# Patient Record
Sex: FEMALE | Race: BLACK OR AFRICAN AMERICAN | Employment: OTHER | URBAN - METROPOLITAN AREA
[De-identification: names, ages, dates, MRNs, and addresses within clinical notes are randomized per-mention and may not be internally consistent; named-entity substitution may affect disease eponyms.]

---

## 2017-01-16 ENCOUNTER — TELEPHONE (OUTPATIENT)
Dept: NEUROLOGY | Age: 82
End: 2017-01-16

## 2017-01-16 NOTE — TELEPHONE ENCOUNTER
Pt daughter, has questions about Depakote, would like a returned phone call please. Wants to know how long she is supposed to be taking the medication and how it's affecting her.

## 2017-01-17 ENCOUNTER — TELEPHONE (OUTPATIENT)
Dept: NEUROLOGY | Age: 82
End: 2017-01-17

## 2017-01-24 NOTE — TELEPHONE ENCOUNTER
Pt's daughter calling about medication Depakote. She said she isn't seeing any progress she seems to think the pt is getting worse.  Please give her a call back

## 2017-02-10 RX ORDER — POTASSIUM CHLORIDE 750 MG/1
TABLET, EXTENDED RELEASE ORAL
Qty: 30 TAB | Refills: 2 | Status: SHIPPED | OUTPATIENT
Start: 2017-02-10 | End: 2017-05-12 | Stop reason: SDUPTHER

## 2017-02-28 ENCOUNTER — OFFICE VISIT (OUTPATIENT)
Dept: INTERNAL MEDICINE CLINIC | Age: 82
End: 2017-02-28

## 2017-02-28 VITALS
SYSTOLIC BLOOD PRESSURE: 118 MMHG | RESPIRATION RATE: 20 BRPM | HEART RATE: 71 BPM | BODY MASS INDEX: 23.79 KG/M2 | WEIGHT: 126 LBS | DIASTOLIC BLOOD PRESSURE: 68 MMHG | OXYGEN SATURATION: 97 % | TEMPERATURE: 97.9 F | HEIGHT: 61 IN

## 2017-02-28 DIAGNOSIS — M79.642 PAIN OF LEFT HAND: Primary | ICD-10-CM

## 2017-02-28 DIAGNOSIS — R63.4 WEIGHT LOSS, UNINTENTIONAL: ICD-10-CM

## 2017-02-28 DIAGNOSIS — C83.38 DIFFUSE LARGE B-CELL LYMPHOMA OF LYMPH NODES OF MULTIPLE REGIONS (HCC): ICD-10-CM

## 2017-02-28 NOTE — MR AVS SNAPSHOT
Visit Information Date & Time Provider Department Dept. Phone Encounter #  
 2/28/2017  2:30 PM Saritha Castano MD [facility] Internal Medicine [phone] [id number] Your Appointments 3/16/2017  3:00 PM  
[facility] [address] [facility]) Appt Note: 4 month follow up memory loss KRU 11/17 [address] [place] [address]  
[phone]  
  
   
 [address] [address] Upcoming Health Maintenance Date Due DTaP/Tdap/Td series (1 - Tdap) 5/8/1952 ZOSTER VACCINE AGE 60> 5/8/1991 GLAUCOMA SCREENING Q2Y 5/8/1996 MEDICARE YEARLY EXAM 5/8/1996 Pneumococcal 65+ High/Highest Risk (2 of 2 - PCV13) 5/1/2011 Allergies as of 2/28/2017  Review Complete On: 2/28/2017 By: Florinda Laura LPN Severity Noted Reaction Type Reactions Avapro [Irbesartan]  01/21/2011   Side Effect Other (comments) Hypotension and dizziness Crestor [Rosuvastatin]  01/21/2011   Side Effect Itching, Myalgia Pcn [Penicillins]  01/21/2011   Side Effect Rash Other reaction(s): hives Plaquenil [Hydroxychloroquine]  11/15/2016    Rash Quinine  01/21/2011   Side Effect Other (comments) Other reaction(s): other/intolerance \"ringing in my ears\" Ringing in ear Current Immunizations  Reviewed on 7/11/2016 Name Date Influenza High Dose Vaccine PF 11/1/2016 Influenza Vaccine 10/1/2010 Novel Influenza-H1N1-09, All Formulations 10/1/2010 Pneumococcal Polysaccharide (PPSV-23) 5/1/2010 Not reviewed this visit You Were Diagnosed With   
  
 Codes Comments Pain of left hand    -  Primary ICD-10-CM: M94.340 ICD-9-CM: 729.5 Weight loss, unintentional     ICD-10-CM: R63.4 ICD-9-CM: 783.21 Diffuse large B-cell lymphoma of lymph nodes of multiple regions [facility])     ICD-10-CM: C83.38 
ICD-9-CM: 202.88 Vitals  BP  
 118/68 BMI and BSA Data Body Mass Index Body Surface Area  
 23.81 kg/m 2 1.57 m 2 Preferred Pharmacy Pharmacy Name Phone [facility] [address], [address] [place] [address]. [phone] Your Updated Medication List  
  
   
This list is accurate as of: 2/28/17  2:49 PM.  Always use your most recent med list.  
  
  
  
  
 acetaminophen-codeine 300-30 mg per tablet Commonly known as:  TYLENOL-CODEINE #3 Take 1 Tab by mouth every four (4) hours as needed for Pain. Max Daily Amount: 6 Tabs. aspirin 325 mg tablet Commonly known as:  ASPIRIN Take 1 Tab by mouth daily. diclofenac 1 % Gel Commonly known as:  VOLTAREN Apply 2 g to affected area four (4) times daily. Apply on left knee 4 times a day. divalproex  mg tablet Commonly known as:  DEPAKOTE Take 1 Tab by mouth two (2) times a day. furosemide 20 mg tablet Commonly known as:  LASIX Take 1 Tab by mouth daily. KLOR-CON M10 10 mEq tablet Generic drug:  potassium chloride TAKE ONE TABLET BY MOUTH DAILY  
  
 metoprolol tartrate 50 mg tablet Commonly known as:  LOPRESSOR Take 1 Tab by mouth two (2) times a day. MIRALAX 17 gram packet Generic drug:  polyethylene glycol Take 17 g by mouth daily. multivitamin tablet Commonly known as:  ONE A DAY Take 1 Tab by mouth daily. PROAIR HFA 90 mcg/actuation inhaler Generic drug:  albuterol Take  by inhalation. raNITIdine 150 mg tablet Commonly known as:  ZANTAC Take 1 Tab by mouth daily. VITAMIN D3 1,000 unit Cap Generic drug:  cholecalciferol Take  by mouth daily. Introducing [facility]! Anali Pan introduces [employer] patient portal. Now you can access parts of your medical record, email your doctor's office, and request medication refills online. 1. In your internet browser, go to [email or web address] 2. Click on the First Time User? Click Here link in the Sign In box. You will see the New Member Sign Up page. 3. Enter your [employer] Access Code exactly as it appears below. You will not need to use this code after youve completed the sign-up process. If you do not sign up before the expiration date, you must request a new code. · [employer] Access Code: [email or web address] Expires: 5/29/2017  2:49 PM 
 
4. Enter the last four digits of your Social Security Number (xxxx) and Date of Birth (mm/dd/yyyy) as indicated and click Submit. You will be taken to the next sign-up page. 5. Create a [employer] ID. This will be your [employer] login ID and cannot be changed, so think of one that is secure and easy to remember. 6. Create a [employer] password. You can change your password at any time. 7. Enter your Password Reset Question and Answer. This can be used at a later time if you forget your password. 8. Enter your e-mail address. You will receive e-mail notification when new information is available in [address] 9. Click Sign Up. You can now view and download portions of your medical record. 10. Click the Download Summary menu link to download a portable copy of your medical information. If you have questions, please visit the Frequently Asked Questions section of the [employer] website. Remember, [employer] is NOT to be used for urgent needs. For medical emergencies, dial 911. Now available from your iPhone and Android! Please provide this summary of care documentation to your next provider. Your primary care clinician is listed as Rosy Yung. If you have any questions after today's visit, please call [phone].

## 2017-02-28 NOTE — PROGRESS NOTES
Chief Complaint   Patient presents with    Follow Up Chronic Condition     3 month follow up. Has lost some weight,  was 133lb now 126lbs, does not like ensure.  Hand Pain     left hand pain, had a fall from bed months ago and still having pain on top of hand and up the forearm, sometimes tingling sensation in figertips. 1. Have you been to the ER, urgent care clinic since your last visit? Hospitalized since your last visit? No    2. Have you seen or consulted any other health care providers outside of the [address] [place] [address] since your last visit? Include any pap smears or colon screening.  No

## 2017-02-28 NOTE — PROGRESS NOTES
Written by Tasia Brewer, as dictated by Dr. Vesta Kaplan MD.    Luly Bautista is a 80 y.o. female. HPI  The patient comes in today for a follow up. She has lost some weight. She has lost from 133 lbs to 126 lbs since 12/02/2016. She does not have any appetite. She has been having some night sweats. She does have a living will and advanced care plan. She does have a DNR. She is going to PT twice a week and she is ambulating with a walker. She had a fall from bed months ago in 08/2016 and still having pain on top of hand, and sometimes tingling sensation in figertips. She has not followed with a hematologist for the [address] She has been in remission and she has not seen anyone since. Patient Active Problem List   Diagnosis Code    Dyspnea on exertion R06.09    Lupus erythematosus L93.0    Sjogren's syndrome ([facility] M35.00    Chronic airway obstruction ([facility] J44.9    Hypertensive heart disease I11.9    Dyslipidemia E78.5    Coronary atherosclerosis of native coronary artery I25.10    Lymphoma, non-Hodgkin's (HCC) C85.90    Hypercholesterolemia E78.00    Essential hypertension, benign I10    Left displaced femoral neck fracture ([facility]) S72.002A    Diffuse large B-cell lymphoma of lymph nodes of multiple regions ([facility]) C83.38    Pulmonary fibrosis ([facility]) J84.10    Status post angioplasty with stent Z95.9    Essential hypertension with goal blood pressure less than 140/90 I10    Mixed hyperlipidemia E78.2    Lupus (systemic lupus erythematosus) (HCC) M32.9    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  60 Tab 0    cholecalciferol (VITAMIN D3) 1,000 unit cap Take  by mouth daily.  divalproex DR (DEPAKOTE) 250 mg tablet Take 1 Tab by mouth two (2) times a day. 60 Tab 3    multivitamin (ONE A DAY) tablet Take 1 Tab by mouth daily.  furosemide (LASIX) 20 mg tablet Take 1 Tab by mouth daily. 30 Tab 1    diclofenac (VOLTAREN) 1 % gel Apply 2 g to affected area four (4) times daily. Apply on left knee 4 times a day. 4 g 1    aspirin (ASPIRIN) 325 mg tablet Take 1 Tab by mouth daily. 30 Tab 1    polyethylene glycol (MIRALAX) 17 gram packet Take 17 g by mouth daily.  albuterol (PROAIR HFA) 90 mcg/actuation inhaler Take  by inhalation.  ranitidine (ZANTAC) 150 mg tablet Take 1 Tab by mouth daily. 30 Tab 0    acetaminophen-codeine (TYLENOL-CODEINE #3) 300-30 mg per tablet Take 1 Tab by mouth every four (4) hours as needed for Pain. Max Daily Amount: 6 Tabs. 30 Tab 0     No current facility-administered medications on file prior to visit. Allergies   Allergen Reactions    Avapro [Irbesartan] Other (comments)     Hypotension and dizziness    Crestor [Rosuvastatin] Itching and Myalgia    Pcn [Penicillins] Rash     Other reaction(s): hives    Plaquenil [Hydroxychloroquine] Rash    Quinine Other (comments)     Other reaction(s): other/intolerance  \"ringing in my ears\"  Ringing in ear       Past Medical History:   Diagnosis Date    CAD (coronary artery disease)     Chronic    Chronic airway obstruction, not elsewhere classified (HCC)     Frequent cough, wheezing secondary to lupus and COPD    Closed left hip fracture ([address]     Decubitus ulcer of sacral region, stage 3 ([address] 8/18/2016    Dyspnea on exertion     Echocardiogram 12/02/2010    EF 60-65%. Gr 1 DDfx. Mild LVH. Mild MR.    Essential hypertension, benign     GERD (gastroesophageal reflux disease)     Headache(784.0)     History of myocardial perfusion scan 09/14/2012    No evidence of ischemia or infarction. Very mild apical thinning. EF 77%.   No WMA.  TID 1.46, suggests 3-vessel CAD. Intermediate to high risk pharm stress test due to TID.  Hypercholesterolemia     Lower extremity venous duplex 05/08/2013    Left leg:  No venous thrombosis or venous insufficiency.  Lupus erythematosus 1992    Lymphoma, non-Hodgkin's ([facility] 5/2011    Low-grade being followed by Dr. Dedrick Cantu without therapy currently    Pneumothorax 1981    Blebs in right lung with recurrent Pneumothorax    S/P cardiac cath 09/24/2012    Hvy calcification of coronary arteries. LM minimal.  mLAD 35%. oD2 70% (unchanged). LCX mild. pRCA mild. Minimal ISR of prev stent. LVEDP 14-16. EF 65%. No WMA. Likely a falsely abnormal stress test.    Short-term memory loss     From fall in 2003    Sjogren's syndrome ([facility] 1992    report of ROGELIO, SSA, RF positive    Traumatic subdural hematoma ([facility] 2003    Head injury with subdural - s/p fall       Past Surgical History:   Procedure Laterality Date    CARDIAC SURG PROCEDURE UNLIST      cardiac stent    CHEST SURGERY PROCEDURE UNLISTED  1981    RUL removal    HX ADENOIDECTOMY  11/2013    eyelids lifted    HX CATARACT REMOVAL Bilateral     w/ lens implants    HX CORONARY STENT PLACEMENT  8/21/2007    HX HEART CATHETERIZATION  9/24/2012    HX HEART CATHETERIZATION  8/21/2007    [facility] Onel Edge [facility] - stent [facility]    HX HEENT  2003    subdural hematoma removed s/p fall    HX HYSTERECTOMY  1974    HX LOBECTOMY Right     upper portion    HX PNEUMONECTOMY      partial    NEUROLOGICAL PROCEDURE UNLISTED      subdural hematoma       Family History   Problem Relation Age of Onset    Cancer Father     Cancer Brother        Social History     Social History    Marital status:      Spouse name: N/A    Number of children: N/A    Years of education: N/A     Occupational History    Not on file.      Social History Main Topics    Smoking status: Former Smoker     Quit date: 1/1/1981    Smokeless tobacco: Never Used    Alcohol use Yes      Comment: once a month drinks wine    Drug use: No    Sexual activity: Not Currently     Other Topics Concern    Not on file     Social History Narrative    ** Merged History Encounter **            Office Visit on 12/02/2016   Component Date Value Ref Range Status    Glucose 12/02/2016 81  65 - 99 mg/dL Final    BUN 12/02/2016 10  8 - 27 mg/dL Final    Creatinine 12/02/2016 1.15* 0.57 - 1.00 mg/dL Final    GFR est non-AA 12/02/2016 43* >59 mL/min/1.73 Final    GFR est AA 12/02/2016 50* >59 mL/min/1.73 Final    BUN/Creatinine ratio 12/02/2016 9* 11 - 26 Final    Sodium 12/02/2016 143  136 - 144 mmol/L Final    Comment: **Effective December 12, 2016 the reference interval**    for Sodium, Serum will be changing to:                                              134 - 144      Potassium 12/02/2016 4.1  3.5 - 5.2 mmol/L Final    Chloride 12/02/2016 100  97 - 106 mmol/L Final    Comment: **Effective December 12, 2016 the reference interval**    for Chloride, Serum will be changing to:                                               96 - 106      CO2 12/02/2016 30* 18 - 29 mmol/L Final    Calcium 12/02/2016 10.6* 8.7 - 10.3 mg/dL Final    Protein, total 12/02/2016 6.9  6.0 - 8.5 g/dL Final    Albumin 12/02/2016 4.2  3.5 - 4.7 g/dL Final    GLOBULIN, TOTAL 12/02/2016 2.7  1.5 - 4.5 g/dL Final    A-G Ratio 12/02/2016 1.6  1.1 - 2.5 Final    Bilirubin, total 12/02/2016 0.3  0.0 - 1.2 mg/dL Final    Alk. phosphatase 12/02/2016 58  39 - 117 IU/L Final    AST (SGOT) 12/02/2016 20  0 - 40 IU/L Final    ALT (SGPT) 12/02/2016 8  0 - 32 IU/L Final    Interpretation 12/02/2016 Note   Final    Supplement report is available. Review of Systems   Constitutional: Positive for weight loss. Negative for malaise/fatigue. HENT: Negative for congestion. Respiratory: Negative for cough and wheezing. Cardiovascular: Negative for chest pain and palpitations.    Musculoskeletal: Negative for joint pain and myalgias. Neurological: Negative for weakness and headaches. Psychiatric/Behavioral: Negative for depression and suicidal ideas. The patient is not nervous/anxious. Visit Vitals    /68    Pulse 71    Temp 97.9 °F (36.6 °C) (Oral)    Resp 20    Ht 5' 1\" (1.549 m)    Wt 126 lb (57.2 kg)    SpO2 97%    BMI 23.81 kg/m2     Physical Exam   Constitutional: She is oriented to person, place, and time. She appears well-nourished. No distress. HENT:   Right Ear: External ear normal.   Left Ear: External ear normal.   Mouth/Throat: Oropharynx is clear and moist.   Eyes: Conjunctivae and EOM are normal. Right eye exhibits no discharge. Left eye exhibits no discharge. Neck: Normal range of motion. Neck supple. Cardiovascular: Normal rate and regular rhythm. Pulmonary/Chest: Effort normal and breath sounds normal. She has no wheezes. Abdominal: Soft. Bowel sounds are normal. She exhibits no distension. Musculoskeletal:   L hand tenderness on palpation, ROM normal, no swelling   Lymphadenopathy:     She has no cervical adenopathy. Neurological: She is alert and oriented to person, place, and time. Skin: Skin is intact. Psychiatric: She has a normal mood and affect. Nursing note and vitals reviewed. ASSESSMENT and PLAN    ICD-10-CM ICD-9-CM    1. Pain of left hand M79.642 729.5 I discussed that she can put asper cream on her hand as well. 2. Weight loss, unintentional R63.4 783.21 I discussed she needs to eat in order to help with wound healing. I suggested she should drink milkshakes with fruit in order to help keep her weight up. 3. Diffuse large B-cell lymphoma of lymph nodes of multiple regions [facility]) C83.38 202.88 I discussed that she should follow back up with a hematologist here Dr. Julia Cornejo or back with Dr. Jennifer Carlos in  [place] in order to have her checked out since she has lost weight. This plan was reviewed with the patient and patient agrees.  All questions were answered. This scribe documentation was reviewed by me and accurately reflects the examination and decisions made by me. This note will not be viewable in [address]

## 2017-03-16 ENCOUNTER — OFFICE VISIT (OUTPATIENT)
Dept: NEUROLOGY | Age: 82
End: 2017-03-16

## 2017-03-16 VITALS
OXYGEN SATURATION: 98 % | HEART RATE: 69 BPM | DIASTOLIC BLOOD PRESSURE: 76 MMHG | RESPIRATION RATE: 16 BRPM | SYSTOLIC BLOOD PRESSURE: 132 MMHG | BODY MASS INDEX: 23.98 KG/M2 | HEIGHT: 61 IN | WEIGHT: 127 LBS

## 2017-03-16 DIAGNOSIS — F01.50 DEMENTIA, VASCULAR, MIXED, WITHOUT BEHAVIORAL DISTURBANCE (HCC): Primary | ICD-10-CM

## 2017-03-16 RX ORDER — DONEPEZIL HYDROCHLORIDE 5 MG/1
5 TABLET, FILM COATED ORAL
Qty: 30 TAB | Refills: 0 | Status: SHIPPED | OUTPATIENT
Start: 2017-03-16 | End: 2017-04-27

## 2017-03-16 NOTE — PROGRESS NOTES
Patient is here for follow up  Short term memory is getting worse  Patient has good days and bad days

## 2017-03-16 NOTE — PATIENT INSTRUCTIONS

## 2017-03-16 NOTE — MR AVS SNAPSHOT
Visit Information Date & Time Provider Department Dept. Phone Encounter #  
 3/16/2017 11:15 AM Jazmyne Mccartney MD [facility] at [address] [phone] Follow-up Instructions Return in about 3 months (around 6/16/2017). Upcoming Health Maintenance Date Due DTaP/Tdap/Td series (1 - Tdap) 5/8/1952 ZOSTER VACCINE AGE 60> 5/8/1991 GLAUCOMA SCREENING Q2Y 5/8/1996 MEDICARE YEARLY EXAM 5/8/1996 Pneumococcal 65+ High/Highest Risk (2 of 2 - PCV13) 5/1/2011 Allergies as of 3/16/2017  Review Complete On: 3/16/2017 By: Jazmyne Mccartney MD  
  
 Severity Noted Reaction Type Reactions Avapro [Irbesartan]  01/21/2011   Side Effect Other (comments) Hypotension and dizziness Crestor [Rosuvastatin]  01/21/2011   Side Effect Itching, Myalgia Pcn [Penicillins]  01/21/2011   Side Effect Rash Other reaction(s): hives Plaquenil [Hydroxychloroquine]  11/15/2016    Rash Quinine  01/21/2011   Side Effect Other (comments) Other reaction(s): other/intolerance \"ringing in my ears\" Ringing in ear Current Immunizations  Reviewed on 7/11/2016 Name Date Influenza High Dose Vaccine PF 11/1/2016 Influenza Vaccine 10/1/2010 Novel Influenza-H1N1-09, All Formulations 10/1/2010 Pneumococcal Polysaccharide (PPSV-23) 5/1/2010 Not reviewed this visit You Were Diagnosed With   
  
 Codes Comments Dementia, vascular, mixed, without behavioral disturbance    -  Primary ICD-10-CM: F01.50 ICD-9-CM: 290.40 Vitals BP Pulse Resp Height(growth percentile) Weight(growth percentile) SpO2  
 132/76 69 16 5' 1\" (1.549 m) 127 lb (57.6 kg) 98% BMI OB Status Smoking Status 24 kg/m2 Hysterectomy Former Smoker Vitals History BMI and BSA Data Body Mass Index Body Surface Area  
 24 kg/m 2 1.57 m 2 Preferred Pharmacy Pharmacy Name Phone [address] [facility]entes, [address] [place] [address]. [phone] Your Updated Medication List  
  
   
This list is accurate as of: 3/16/17 11:55 AM.  Always use your most recent med list.  
  
  
  
  
 acetaminophen-codeine 300-30 mg per tablet Commonly known as:  TYLENOL-CODEINE #3 Take 1 Tab by mouth every four (4) hours as needed for Pain. Max Daily Amount: 6 Tabs. aspirin 325 mg tablet Commonly known as:  ASPIRIN Take 1 Tab by mouth daily. diclofenac 1 % Gel Commonly known as:  VOLTAREN Apply 2 g to affected area four (4) times daily. Apply on left knee 4 times a day. divalproex  mg tablet Commonly known as:  DEPAKOTE Take 1 Tab by mouth two (2) times a day. donepezil 5 mg tablet Commonly known as:  ARICEPT Take 1 Tab by mouth nightly. furosemide 20 mg tablet Commonly known as:  LASIX Take 1 Tab by mouth daily. KLOR-CON M10 10 mEq tablet Generic drug:  potassium chloride TAKE ONE TABLET BY MOUTH DAILY  
  
 metoprolol tartrate 50 mg tablet Commonly known as:  LOPRESSOR Take 1 Tab by mouth two (2) times a day. MIRALAX 17 gram packet Generic drug:  polyethylene glycol Take 17 g by mouth daily. multivitamin tablet Commonly known as:  ONE A DAY Take 1 Tab by mouth daily. PROAIR HFA 90 mcg/actuation inhaler Generic drug:  albuterol Take  by inhalation. raNITIdine 150 mg tablet Commonly known as:  ZANTAC Take 1 Tab by mouth daily. VITAMIN D3 1,000 unit Cap Generic drug:  cholecalciferol Take  by mouth daily. Prescriptions Sent to Pharmacy Refills  
 donepezil (ARICEPT) 5 mg tablet 0 Sig: Take 1 Tab by mouth nightly. Class: Normal  
 Pharmacy: [address] [facility]entes, [address] [place] [address] RD. Ph #: [phone] Route: Oral  
  
Follow-up Instructions Return in about 3 months (around 6/16/2017). To-Do List   
 03/23/2017 11:00 AM  
  Appointment with [facility] PET 1 at [facility] RAD PET ([phone]) 1. Patient should arrive 30 minutes early to register. Patient's entire visit to the hospital will last about 2 Hours. 2.  Eat a low carb/high protein diet the evening before your procedure. Stay away from food and drink that contains sugars the night before and ESPECIALLY the day of the test. 3.  Do Not eat 4 hours prior to your procedure. The patient may drink all the water they want, up until the time of their appointment. Encourage patient to be well hydrated. Coffee is ok, as long as an artificial sweetener is used instead of sugar. 4. Take meds with water or saltine crackers if food required. 5.  Do not exercise the morning of your procedure. 6.  If you take insulin, it must be taken at least 4 hours before your procedure. 7.  You should bring medications for pain, anxiety, or claustrophobia if you need them. If you take medications for anxiety or claustrophobia, a ride needs to come with you. 8.  Materials for this procedure are ordered in advance and are time-sensitive. If you need to cancel or reschedule, you must call [phone] at least 48 hours prior to your appointment time. 9.  Bring recent CT scan results from other facilities with you. Please have patients report to:  [facility] [place]: ER Registration SFM: [address] [facility], Radiation/Oncology Department (left of the fireplace) MRM: OP Registration MOB 1. Patient Instructions A Healthy Lifestyle: Care Instructions Your Care Instructions A healthy lifestyle can help you feel good, stay at a healthy weight, and have plenty of energy for both work and play. A healthy lifestyle is something you can share with your whole family. A healthy lifestyle also can lower your risk for serious health problems, such as high blood pressure, heart disease, and diabetes. You can follow a few steps listed below to improve your health and the health of your family. Follow-up care is a key part of your treatment and safety. Be sure to make and go to all appointments, and call your doctor if you are having problems. Its also a good idea to know your test results and keep a list of the medicines you take. How can you care for yourself at home? · Do not eat too much sugar, fat, or fast foods. You can still have dessert and treats now and then. The goal is moderation. · Start small to improve your eating habits. Pay attention to portion sizes, drink less juice and soda pop, and eat more fruits and vegetables. ¨ Eat a healthy amount of food. A 3-ounce serving of meat, for example, is about the size of a deck of cards. Fill the rest of your plate with vegetables and whole grains. ¨ Limit the amount of soda and sports drinks you have every day. Drink more water when you are thirsty. ¨ Eat at least 5 servings of fruits and vegetables every day. It may seem like a lot, but it is not hard to reach this goal. A serving or helping is 1 piece of fruit, 1 cup of vegetables, or 2 cups of leafy, raw vegetables. Have an apple or some carrot sticks as an afternoon snack instead of a candy bar. Try to have fruits and/or vegetables at every meal. 
· Make exercise part of your daily routine. You may want to start with simple activities, such as walking, bicycling, or slow swimming. Try to be active 30 to 60 minutes every day. You do not need to do all 30 to 60 minutes all at once. For example, you can exercise 3 times a day for 10 or 20 minutes. Moderate exercise is safe for most people, but it is always a good idea to talk to your doctor before starting an exercise program. 
· Keep moving. Neri Schooling the lawn, work in the garden, or [facility]. Take the stairs instead of the elevator at work. · If you smoke, quit. People who smoke have an increased risk for heart attack, stroke, cancer, and other lung illnesses.  Quitting is hard, but there are ways to boost your chance of quitting tobacco for good. ¨ Use nicotine gum, patches, or lozenges. ¨ Ask your doctor about stop-smoking programs and medicines. ¨ Keep trying. In addition to reducing your risk of diseases in the future, you will notice some benefits soon after you stop using tobacco. If you have shortness of breath or asthma symptoms, they will likely get better within a few weeks after you quit. · Limit how much alcohol you drink. Moderate amounts of alcohol (up to 2 drinks a day for men, 1 drink a day for women) are okay. But drinking too much can lead to liver problems, high blood pressure, and other health problems. Family health If you have a family, there are many things you can do together to improve your health. · Eat meals together as a family as often as possible. · Eat healthy foods. This includes fruits, vegetables, lean meats and dairy, and whole grains. · Include your family in your fitness plan. Most people think of activities such as jogging or tennis as the way to fitness, but there are many ways you and your family can be more active. Anything that makes you breathe hard and gets your heart pumping is exercise. Here are some tips: 
¨ Walk to do errands or to take your child to school or the bus. ¨ Go for a family bike ride after dinner instead of watching TV. Where can you learn more? Go to http://melvina-bridger.info/. Enter L593 in the search box to learn more about \"A Healthy Lifestyle: Care Instructions. \" Current as of: July 26, 2016 Content Version: 11.1 © 7455-9032 Healthwise, Incorporated. Care instructions adapted under license by [employer] (which disclaims liability or warranty for this information). If you have questions about a medical condition or this instruction, always ask your healthcare professional. [place] [address] any warranty or liability for your use of this information. Introducing [facility]! Aline Ananadine introduces Suitey patient portal. Now you can access parts of your medical record, email your doctor's office, and request medication refills online. 1. In your internet browser, go to [email or web address] 2. Click on the First Time User? Click Here link in the Sign In box. You will see the New Member Sign Up page. 3. Enter your Suitey Access Code exactly as it appears below. You will not need to use this code after youve completed the sign-up process. If you do not sign up before the expiration date, you must request a new code. · Suitey Access Code: [email or web address] Expires: 5/29/2017  3:49 PM 
 
4. Enter the last four digits of your Social Security Number (xxxx) and Date of Birth (mm/dd/yyyy) as indicated and click Submit. You will be taken to the next sign-up page. 5. Create a Suitey ID. This will be your Suitey login ID and cannot be changed, so think of one that is secure and easy to remember. 6. Create a Suitey password. You can change your password at any time. 7. Enter your Password Reset Question and Answer. This can be used at a later time if you forget your password. 8. Enter your e-mail address. You will receive e-mail notification when new information is available in [address] 9. Click Sign Up. You can now view and download portions of your medical record. 10. Click the Download Summary menu link to download a portable copy of your medical information. If you have questions, please visit the Frequently Asked Questions section of the Suitey website. Remember, Suitey is NOT to be used for urgent needs. For medical emergencies, dial 911. Now available from your iPhone and Android! Please provide this summary of care documentation to your next provider. Your primary care clinician is listed as Irasema Wray. If you have any questions after today's visit, please call [phone].

## 2017-03-16 NOTE — PROGRESS NOTES
Chief Complaint   Patient presents with    Memory Loss         HISTORY OF PRESENT ILLNESS  Arceino Martinez came back for a follow up. She has remained stable. She still needs assistance with the some activities of daily living. She is able to walk with the help of a walker. She had hip surgery a few months ago. She also has back issues and is going for an epidural injection. She was diagnosed with possible mixed type dementia last year . She was started on Aricept, but apparently discontinued it due to unclear reasons. She is now on Depakote, which was apparently started for mood swings, and it seems to have helped. Current Outpatient Prescriptions   Medication Sig    donepezil (ARICEPT) 5 mg tablet Take 1 Tab by mouth nightly.  KLOR-CON M10 10 mEq tablet TAKE ONE TABLET BY MOUTH DAILY    metoprolol tartrate (LOPRESSOR) 50 mg tablet Take 1 Tab by mouth two (2) times a day.  cholecalciferol (VITAMIN D3) 1,000 unit cap Take  by mouth daily.  multivitamin (ONE A DAY) tablet Take 1 Tab by mouth daily.  furosemide (LASIX) 20 mg tablet Take 1 Tab by mouth daily.  aspirin (ASPIRIN) 325 mg tablet Take 1 Tab by mouth daily.  polyethylene glycol (MIRALAX) 17 gram packet Take 17 g by mouth daily.  ranitidine (ZANTAC) 150 mg tablet Take 1 Tab by mouth daily.  acetaminophen-codeine (TYLENOL-CODEINE #3) 300-30 mg per tablet Take 1 Tab by mouth every four (4) hours as needed for Pain. Max Daily Amount: 6 Tabs.  divalproex DR (DEPAKOTE) 250 mg tablet Take 1 Tab by mouth two (2) times a day.  diclofenac (VOLTAREN) 1 % gel Apply 2 g to affected area four (4) times daily. Apply on left knee 4 times a day.  albuterol (PROAIR HFA) 90 mcg/actuation inhaler Take  by inhalation. No current facility-administered medications for this visit.       PHYSICAL EXAMINATION:    Visit Vitals    /76    Pulse 69    Resp 16    Ht 5' 1\" (1.549 m)    Wt 57.6 kg (127 lb)    SpO2 98%    BMI 24 kg/m2       NEUROLOGICAL EXAMINATION:     Mental Status:   Alert. She is able to answer all simple questions. She knows today's date, her date of birth, her street address. She can spell the word house forwards and backwards. Could not do serial 7 subtractions. Knows the name of the presidenIt and president elect. She scored 20/30 on [place] cognitive assessment    Cranial Nerves:    II, III, IV, VI:  Visual acuity grossly intact. Visual fields are normal.    Pupils are equal, round, and reactive to light and accommodation. Extra-ocular movements are full and fluid. Fundoscopic exam was benign, no ptosis or nystagmus. V-XII: Hearing is grossly intact. Facial features are symmetric, with normal sensation and strength. The palate rises symmetrically and the tongue protrudes midline. Sternocleidomastoids 5/5. Motor Examination: Normal tone, bulk, and strength. 5/5 muscle strength throughout. No cogwheel rigidity or clonus present. Sensory exam:  Normal throughout to pinprick, temperature, and vibration sense. Normal proprioception. Coordination:  Heel-to-shin was smooth and symmetrical bilaterally. Finger to nose and rapid arm movement testing was normal.   No resting or intention tremor    Gait and Station:  On a wheelchair, but able to walk with minimal assistance. No muscle wasting or fasiculations noted. Reflexes:  DTRs 2+ throughout. Toes downgoing. LABS:  B12, and TSH was normal    MRI Results (most recent):    Results from Hospital Encounter encounter on 09/21/16   MRI BRAIN W WO CONT   Narrative EXAM:  MRI BRAIN W WO CONT    INDICATION:    to rule out any mass lesion    COMPARISON:  CT brain 8/27/2016. Quincy Steele     CONTRAST: 6 ml Gadavist    TECHNIQUE: MRI of the brain was performed with sagittal spin-echo T1, axial L4  FSE T2, axial T2 FLAIR, axial T2*gradient echo, axial spin-echo T1, postcontrast  axial spin-echo T1, sagittal 3-D BRAVO post contrast thin section, coronal  postcontrast spin-echo T1, and axial diffusion imaging. Coronal and axial  reconstructions of the 3-D BRAVO data set are performed. FINDINGS: There is expected appearance to postsurgical change of left frontal  temporal craniotomy with susceptibility related to metallic plates and screws. There is mild ventricular and sulcal prominence in a symmetric and proportionate  pattern. There is rather prominent periventricular and subcortical white matter  T2 signal elevation. There are otherwise no areas of abnormal signal in the  cerebral hemispheres, brainstem or cerebellum. There is no evidence of mass,  hemorrhage, acute infarct or abnormal extra-axial fluid collection. Normal  appearing flow-voids are present in the vertebral, basilar and carotid artery  systems. The craniocervical junction is normal.  There is no restricted  diffusion. The structures at the cranial base including paranasal sinuses and  mastoid air cells are unremarkable. There is no abnormal parenchymal or  meningeal enhancement. Impression IMPRESSION:  No MRI evidence of mass lesion or other abnormal enhancement. Mild  atrophic change and nonspecific white matter changes most commonly seen with  chronic small vessel ischemic and/or senescent change. Status post left  frontotemporal craniotomy. ASSESSMENT    ICD-10-CM ICD-9-CM    1. Dementia, vascular, mixed, without behavioral disturbance F01.50 290.40 donepezil (ARICEPT) 5 mg tablet       DISCUSSION  Ms. Justino Panda has mild mixed type of dementia.    I have recommended retrying Aricept again and continue to monitor her cognitive function  May continue Depakote that has helped with mood  She is currently in a supervised situation and has remained stable over the past 6 months  Follow up in 3-4 months     Dragan Ennis MD  Diplomate, American Board of Psychiatry & Neurology (Neurology)  [facility], [address] of Psychiatry & Neurology (Clinical Neurophysiology)  Diplomate, American Board of Electrodiagnostic Medicine

## 2017-03-23 ENCOUNTER — HOSPITAL ENCOUNTER (OUTPATIENT)
Dept: PET IMAGING | Age: 82
Discharge: HOME | End: 2017-03-23
Attending: INTERNAL MEDICINE
Payer: MEDICARE

## 2017-03-23 DIAGNOSIS — C83.31 DIFFUSE LARGE B-CELL LYMPHOMA, LYMPH NODES OF HEAD, FACE, AND NECK (HCC): ICD-10-CM

## 2017-03-23 DIAGNOSIS — C85.18 UNSPECIFIED B-CELL LYMPHOMA, LYMPH NODES OF MULTIPLE SITES (HCC): ICD-10-CM

## 2017-03-23 PROCEDURE — A9552 F18 FDG: HCPCS

## 2017-03-23 RX ORDER — SODIUM CHLORIDE 0.9 % (FLUSH) 0.9 %
10 SYRINGE (ML) INJECTION
Status: COMPLETED | OUTPATIENT
Start: 2017-03-23 | End: 2017-03-23

## 2017-03-23 RX ADMIN — Medication 10 ML: at 10:47

## 2017-03-27 RX ORDER — FUROSEMIDE 20 MG/1
20 TABLET ORAL DAILY
Qty: 30 TAB | Refills: 1 | Status: SHIPPED | OUTPATIENT
Start: 2017-03-27 | End: 2017-08-28 | Stop reason: SDUPTHER

## 2017-03-29 ENCOUNTER — DOCUMENTATION ONLY (OUTPATIENT)
Dept: INTERNAL MEDICINE CLINIC | Age: 82
End: 2017-03-29

## 2017-03-29 NOTE — PROGRESS NOTES
Spoke to this patient daughter after making 2 identifications, states she is worried about her blood pressure today it is 141/91. Explained to patient it was okay and just to keep monitoring her blood pressure and if higher that 190/100 to call the office back for an appointment. Patient is eating very little but is drinking.

## 2017-04-03 ENCOUNTER — HOSPITAL ENCOUNTER (OUTPATIENT)
Dept: LAB | Age: 82
Discharge: HOME | End: 2017-04-03
Payer: MEDICARE

## 2017-04-03 ENCOUNTER — OFFICE VISIT (OUTPATIENT)
Dept: INTERNAL MEDICINE CLINIC | Age: 82
End: 2017-04-03

## 2017-04-03 VITALS
WEIGHT: 123 LBS | OXYGEN SATURATION: 97 % | BODY MASS INDEX: 23.22 KG/M2 | DIASTOLIC BLOOD PRESSURE: 76 MMHG | HEIGHT: 61 IN | RESPIRATION RATE: 16 BRPM | SYSTOLIC BLOOD PRESSURE: 132 MMHG | HEART RATE: 76 BPM | TEMPERATURE: 98.8 F

## 2017-04-03 DIAGNOSIS — K59.09 OTHER CONSTIPATION: ICD-10-CM

## 2017-04-03 DIAGNOSIS — M54.50 CHRONIC MIDLINE LOW BACK PAIN WITHOUT SCIATICA: ICD-10-CM

## 2017-04-03 DIAGNOSIS — I10 ESSENTIAL HYPERTENSION, BENIGN: Primary | ICD-10-CM

## 2017-04-03 DIAGNOSIS — R63.0 LOSS OF APPETITE: ICD-10-CM

## 2017-04-03 DIAGNOSIS — R06.09 DYSPNEA ON EXERTION: ICD-10-CM

## 2017-04-03 DIAGNOSIS — G89.29 CHRONIC MIDLINE LOW BACK PAIN WITHOUT SCIATICA: ICD-10-CM

## 2017-04-03 PROCEDURE — 80053 COMPREHEN METABOLIC PANEL: CPT

## 2017-04-03 PROCEDURE — 85025 COMPLETE CBC W/AUTO DIFF WBC: CPT

## 2017-04-03 NOTE — PROGRESS NOTES
Chief Complaint   Patient presents with    Follow-up     contipation, stated goes every 2 days. Appetite not doing great.  Hypertension     bp higher than typical, feeling weak, tremors, \"not feeling well\"     1. Have you been to the ER, urgent care clinic since your last visit? Hospitalized since your last visit? No    2. Have you seen or consulted any other health care providers outside of the [address] [place] [address] since your last visit? Include any pap smears or colon screening.  No

## 2017-04-03 NOTE — MR AVS SNAPSHOT
Visit Information Date & Time Provider Department Dept. Phone Encounter #  
 4/3/2017  2:00 PM [facility] [phone] [id number] Follow-up Instructions Return if symptoms worsen or fail to improve. Follow-up and Disposition History Your Appointments 6/29/2017  1:00 PM  
Follow Up with Harjinder Shea MD  
[facility] at [address] [facility] Appt Note: 3 month f/u memory loss KU 3/16  
 [address] [facility] [address]  
[phone]  
  
   
 [address] [place] [address] Upcoming Health Maintenance Date Due DTaP/Tdap/Td series (1 - Tdap) 5/8/1952 ZOSTER VACCINE AGE 60> 5/8/1991 GLAUCOMA SCREENING Q2Y 5/8/1996 MEDICARE YEARLY EXAM 5/8/1996 Allergies as of 4/3/2017  Review Complete On: 4/3/2017 By: Wilton Toledo LPN Severity Noted Reaction Type Reactions Avapro [Irbesartan]  01/21/2011   Side Effect Other (comments) Hypotension and dizziness Crestor [Rosuvastatin]  01/21/2011   Side Effect Itching, Myalgia Pcn [Penicillins]  01/21/2011   Side Effect Rash Other reaction(s): hives Plaquenil [Hydroxychloroquine]  11/15/2016    Rash Quinine  01/21/2011   Side Effect Other (comments) Other reaction(s): other/intolerance \"ringing in my ears\" Ringing in ear Current Immunizations  Reviewed on 7/11/2016 Name Date Influenza High Dose Vaccine PF 11/1/2016 Influenza Vaccine 10/1/2010 Novel Influenza-H1N1-09, All Formulations 10/1/2010 Pneumococcal Polysaccharide (PPSV-23) 5/1/2010 Not reviewed this visit You Were Diagnosed With   
  
 Codes Comments Essential hypertension, benign    -  Primary ICD-10-CM: I10 
ICD-9-CM: 401.1 Loss of appetite     ICD-10-CM: R63.0 ICD-9-CM: 364. 0  Chronic midline low back pain without sciatica     ICD-10-CM: M54.5, G89.29 
 ICD-9-CM: 724.2, 338.29 Other constipation     ICD-10-CM: K59.09 
ICD-9-CM: 564.09 Dyspnea on exertion     ICD-10-CM: R06.09 
ICD-9-CM: 786.09 Vitals BP Pulse Temp Resp Height(growth percentile) Weight(growth percentile) 132/76 76 98.8 °F (37.1 °C) (Oral) 16 5' 1\" (1.549 m) 123 lb (55.8 kg) SpO2 BMI OB Status Smoking Status 97% 23.24 kg/m2 Hysterectomy Former Smoker BMI and BSA Data Body Mass Index Body Surface Area  
 23.24 kg/m 2 1.55 m 2 Preferred Pharmacy Pharmacy Name Phone [facility] [address], [address] [place] [address]. [phone] Your Updated Medication List  
  
   
This list is accurate as of: 4/3/17 11:59 PM.  Always use your most recent med list.  
  
  
  
  
 acetaminophen-codeine 300-30 mg per tablet Commonly known as:  TYLENOL-CODEINE #3 Take 1 Tab by mouth every four (4) hours as needed for Pain. Max Daily Amount: 6 Tabs. aspirin 325 mg tablet Commonly known as:  ASPIRIN Take 1 Tab by mouth daily. diclofenac 1 % Gel Commonly known as:  VOLTAREN Apply 2 g to affected area four (4) times daily. Apply on left knee 4 times a day. divalproex  mg tablet Commonly known as:  DEPAKOTE Take 1 Tab by mouth two (2) times a day. donepezil 5 mg tablet Commonly known as:  ARICEPT Take 1 Tab by mouth nightly. furosemide 20 mg tablet Commonly known as:  LASIX Take 1 Tab by mouth daily. KLOR-CON M10 10 mEq tablet Generic drug:  potassium chloride TAKE ONE TABLET BY MOUTH DAILY  
  
 metoprolol tartrate 50 mg tablet Commonly known as:  LOPRESSOR Take 1 Tab by mouth two (2) times a day. MIRALAX 17 gram packet Generic drug:  polyethylene glycol Take 17 g by mouth daily. multivitamin tablet Commonly known as:  ONE A DAY Take 1 Tab by mouth daily. PROAIR HFA 90 mcg/actuation inhaler Generic drug:  albuterol Take  by inhalation. raNITIdine 150 mg tablet Commonly known as:  ZANTAC Take 1 Tab by mouth daily. VITAMIN D3 1,000 unit Cap Generic drug:  cholecalciferol Take  by mouth daily. We Performed the Following CBC WITH AUTOMATED DIFF [43212 CPT(R)] CKD REPORT [NOCPT Custom] METABOLIC PANEL, COMPREHENSIVE [64957 CPT(R)] Follow-up Instructions Return if symptoms worsen or fail to improve. Patient Instructions Thank you for choosing [facility]. We know you have many options when it comes to your healthcare; we appreciate that you picked us. Our goal is to provide exceptional 
service and world class care for every patient. You may receive a survey in the mail or by email asking for your feedback. Please take a few minutes to share your thoughts about your recent visit. Your comments helps us understand what we do well and what we can do better. To ensure confidentiality, this survey is administered by an independent third-party, [employer] [place] participation will help us to improve the quality of care that we provide to you, your family, friends, and neighbors. Thank you! Chronic Pain: Care Instructions Your Care Instructions Chronic pain is pain that lasts a long time (months or even years) and may or may not have a clear cause. It is different from acute pain, which usually does have a clear causelike an injury or illnessand gets better over time. Chronic pain: 
· Lasts over time but may vary from day to day. · Does not go away despite efforts to end it. · May disrupt your sleep and lead to fatigue. · May cause depression or anxiety. · May make your muscles tense, causing more pain. · Can disrupt your work, hobbies, home life, and relationships with friends and family. Chronic pain is a very real condition. It is not just in your head.  Treatment can help and usually includes several methods used together, such as medicines, physical therapy, exercise, and other treatments. Learning how to relax and changing negative thought patterns can also help you cope. Chronic pain is complex. Taking an active role in your treatment will help you better manage your pain. Tell your doctor if you have trouble dealing with your pain. You may have to try several things before you find what works best for you. Follow-up care is a key part of your treatment and safety. Be sure to make and go to all appointments, and call your doctor if you are having problems. Its also a good idea to know your test results and keep a list of the medicines you take. How can you care for yourself at home? · Pace yourself. Break up large jobs into smaller tasks. Save harder tasks for days when you have less pain, or go back and forth between hard tasks and easier ones. Take rest breaks. · Relax, and reduce stress. Relaxation techniques such as deep breathing or meditation can help. · Keep moving. Gentle, daily exercise can help reduce pain over the long run. Try low- or no-impact exercises such as walking, swimming, and stationary biking. Do stretches to stay flexible. · Try heat, cold packs, and massage. · Get enough sleep. Chronic pain can make you tired and drain your energy. Talk with your doctor if you have trouble sleeping because of pain. · Think positive. Your thoughts can affect your pain level. Do things that you enjoy to distract yourself when you have pain instead of focusing on the pain. See a movie, read a book, listen to music, or spend time with a friend. · If you think you are depressed, talk to your doctor about treatment. · Keep a daily pain diary. Record how your moods, thoughts, sleep patterns, activities, and medicine affect your pain. You may find that your pain is worse during or after certain activities or when you are feeling a certain emotion.  Having a record of your pain can help you and your doctor find the best ways to treat your pain. · Take pain medicines exactly as directed. ¨ If the doctor gave you a prescription medicine for pain, take it as prescribed. ¨ If you are not taking a prescription pain medicine, ask your doctor if you can take an over-the-counter medicine. Reducing constipation caused by pain medicine · Include fruits, vegetables, beans, and whole grains in your diet each day. These foods are high in fiber. · Drink plenty of fluids, enough so that your urine is light yellow or clear like water. If you have kidney, heart, or liver disease and have to limit fluids, talk with your doctor before you increase the amount of fluids you drink. · If your doctor recommends it, get more exercise. Walking is a good choice. Bit by bit, increase the amount you walk every day. Try for at least 30 minutes on most days of the week. · Schedule time each day for a bowel movement. A daily routine may help. Take your time and do not strain when having a bowel movement. When should you call for help? Call your doctor now or seek immediate medical care if: 
· Your pain gets worse or is out of control. · You feel down or blue, or you do not enjoy things like you once did. You may be depressed, which is common in people with chronic pain. Depression can be treated. · You have vomiting or cramps for more than 2 hours. Watch closely for changes in your health, and be sure to contact your doctor if: 
· You cannot sleep because of pain. · You are very worried or anxious about your pain. · You have trouble taking your pain medicine. · You have any concerns about your pain medicine. · You have trouble with bowel movements, such as: 
¨ No bowel movement in 3 days. ¨ Blood in the anal area, in your stool, or on the toilet paper. ¨ Diarrhea for more than 24 hours. Where can you learn more? Go to http://melvina-bridger.info/. Enter N004 in the search box to learn more about \"Chronic Pain: Care Instructions. \" Current as of: October 14, 2016 Content Version: 11.2 © 8633-6677 [employer]. Care instructions adapted under license by [employer] (which disclaims liability or warranty for this information). If you have questions about a medical condition or this instruction, always ask your healthcare professional. [address] any warranty or liability for your use of this information. Back Pain: Care Instructions Your Care Instructions Back pain has many possible causes. It is often related to problems with muscles and ligaments of the back. It may also be related to problems with the nerves, discs, or bones of the back. Moving, lifting, standing, sitting, or sleeping in an awkward way can strain the back. Sometimes you don't notice the injury until later. Arthritis is another common cause of back pain. Although it may hurt a lot, back pain usually improves on its own within several weeks. Most people recover in 12 weeks or less. Using good home treatment and being careful not to stress your back can help you feel better sooner. Follow-up care is a key part of your treatment and safety. Be sure to make and go to all appointments, and call your doctor if you are having problems. Its also a good idea to know your test results and keep a list of the medicines you take. How can you care for yourself at home? · Sit or lie in positions that are most comfortable and reduce your pain. Try one of these positions when you lie down: ¨ Lie on your back with your knees bent and supported by large pillows. ¨ Lie on the floor with your legs on the seat of a sofa or chair. Otilia Pastures on your side with your knees and hips bent and a pillow between your legs. ¨ Lie on your stomach if it does not make pain worse. · Do not sit up in bed, and avoid soft couches and twisted positions.  Bed rest can help relieve pain at first, but it delays healing. Avoid bed rest after the first day of back pain. · Change positions every 30 minutes. If you must sit for long periods of time, take breaks from sitting. Get up and walk around, or lie in a comfortable position. · Try using a heating pad on a low or medium setting for 15 to 20 minutes every 2 or 3 hours. Try a warm shower in place of one session with the heating pad. · You can also try an ice pack for 10 to 15 minutes every 2 to 3 hours. Put a thin cloth between the ice pack and your skin. · Take pain medicines exactly as directed. ¨ If the doctor gave you a prescription medicine for pain, take it as prescribed. ¨ If you are not taking a prescription pain medicine, ask your doctor if you can take an over-the-counter medicine. · Take short walks several times a day. You can start with 5 to 10 minutes, 3 or 4 times a day, and work up to longer walks. Walk on level surfaces and avoid hills and stairs until your back is better. · Return to work and other activities as soon as you can. Continued rest without activity is usually not good for your back. · To prevent future back pain, do exercises to stretch and strengthen your back and stomach. Learn how to use good posture, safe lifting techniques, and proper body mechanics. When should you call for help? Call your doctor now or seek immediate medical care if: 
· You have new or worsening numbness in your legs. · You have new or worsening weakness in your legs. (This could make it hard to stand up.) · You lose control of your bladder or bowels. Watch closely for changes in your health, and be sure to contact your doctor if: 
· Your pain gets worse. · You are not getting better after 2 weeks. Where can you learn more? Go to http://melvina-bridger.info/. Enter X036 in the search box to learn more about \"Back Pain: Care Instructions. \" Current as of: May 23, 2016 Content Version: 11.2 © 8980-1603 [employer]. Care instructions adapted under license by [employer] (which disclaims liability or warranty for this information). If you have questions about a medical condition or this instruction, always ask your healthcare professional. [address] any warranty or liability for your use of this information. DASH Diet: Care Instructions Your Care Instructions The DASH diet is an eating plan that can help lower your blood pressure. DASH stands for Dietary Approaches to Stop Hypertension. Hypertension is high blood pressure. The DASH diet focuses on eating foods that are high in calcium, potassium, and magnesium. These nutrients can lower blood pressure. The foods that are highest in these nutrients are fruits, vegetables, low-fat dairy products, nuts, seeds, and legumes. But taking calcium, potassium, and magnesium supplements instead of eating foods that are high in those nutrients does not have the same effect. The DASH diet also includes whole grains, fish, and poultry. The DASH diet is one of several lifestyle changes your doctor may recommend to lower your high blood pressure. Your doctor may also want you to decrease the amount of sodium in your diet. Lowering sodium while following the DASH diet can lower blood pressure even further than just the DASH diet alone. Follow-up care is a key part of your treatment and safety. Be sure to make and go to all appointments, and call your doctor if you are having problems. It's also a good idea to know your test results and keep a list of the medicines you take. How can you care for yourself at home? Following the DASH diet · Eat 4 to 5 servings of fruit each day. A serving is 1 medium-sized piece of fruit, ½ cup chopped or canned fruit, 1/4 cup dried fruit, or 4 ounces (½ cup) of fruit juice. Choose fruit more often than fruit juice. · Eat 4 to 5 servings of vegetables each day. A serving is 1 cup of lettuce or raw leafy vegetables, ½ cup of chopped or cooked vegetables, or 4 ounces (½ cup) of vegetable juice. Choose vegetables more often than vegetable juice. · Get 2 to 3 servings of low-fat and fat-free dairy each day. A serving is 8 ounces of milk, 1 cup of yogurt, or 1 ½ ounces of cheese. · Eat 6 to 8 servings of grains each day. A serving is 1 slice of bread, 1 ounce of dry cereal, or ½ cup of cooked rice, pasta, or cooked cereal. Try to choose whole-grain products as much as possible. · Limit lean meat, poultry, and fish to 2 servings each day. A serving is 3 ounces, about the size of a deck of cards. · Eat 4 to 5 servings of nuts, seeds, and legumes (cooked dried beans, lentils, and split peas) each week. A serving is 1/3 cup of nuts, 2 tablespoons of seeds, or ½ cup of cooked beans or peas. · Limit fats and oils to 2 to 3 servings each day. A serving is 1 teaspoon of vegetable oil or 2 tablespoons of salad dressing. · Limit sweets and added sugars to 5 servings or less a week. A serving is 1 tablespoon jelly or jam, ½ cup sorbet, or 1 cup of lemonade. · Eat less than 2,300 milligrams (mg) of sodium a day. If you limit your sodium to 1,500 mg a day, you can lower your blood pressure even more. Tips for success · Start small. Do not try to make dramatic changes to your diet all at once. You might feel that you are missing out on your favorite foods and then be more likely to not follow the plan. Make small changes, and stick with them. Once those changes become habit, add a few more changes. · Try some of the following: ¨ Make it a goal to eat a fruit or vegetable at every meal and at snacks. This will make it easy to get the recommended amount of fruits and vegetables each day. ¨ Try yogurt topped with fruit and nuts for a snack or healthy dessert. ¨ Add lettuce, tomato, cucumber, and onion to sandwiches. ¨ Combine a ready-made pizza crust with low-fat mozzarella cheese and lots of vegetable toppings. Try using tomatoes, squash, spinach, broccoli, carrots, cauliflower, and onions. ¨ Have a variety of cut-up vegetables with a low-fat dip as an appetizer instead of chips and dip. ¨ Sprinkle sunflower seeds or chopped almonds over salads. Or try adding chopped walnuts or almonds to cooked vegetables. ¨ Try some vegetarian meals using beans and peas. Add garbanzo or kidney beans to salads. Make burritos and tacos with mashed sanderson beans or black beans. Where can you learn more? Go to http://melvina-bridger.info/. Enter W813 in the search box to learn more about \"DASH Diet: Care Instructions. \" Current as of: March 23, 2016 Content Version: 11.2 © 2529-4308 [email or web address]. Care instructions adapted under license by [employer] (which disclaims liability or warranty for this information). If you have questions about a medical condition or this instruction, always ask your healthcare professional. [place] [address] any warranty or liability for your use of this information. High Blood Pressure: Care Instructions Your Care Instructions If your blood pressure is usually above 140/90, you have high blood pressure, or hypertension. That means the top number is 140 or higher or the bottom number is 90 or higher, or both. Despite what a lot of people think, high blood pressure usually doesn't cause headaches or make you feel dizzy or lightheaded. It usually has no symptoms. But it does increase your risk for heart attack, stroke, and kidney or eye damage. The higher your blood pressure, the more your risk increases. Your doctor will give you a goal for your blood pressure. Your goal will be based on your health and your age. An example of a goal is to keep your blood pressure below 140/90. Lifestyle changes, such as eating healthy and being active, are always important to help lower blood pressure. You might also take medicine to reach your blood pressure goal. 
Follow-up care is a key part of your treatment and safety. Be sure to make and go to all appointments, and call your doctor if you are having problems. It's also a good idea to know your test results and keep a list of the medicines you take. How can you care for yourself at home? Medical treatment · If you stop taking your medicine, your blood pressure will go back up. You may take one or more types of medicine to lower your blood pressure. Be safe with medicines. Take your medicine exactly as prescribed. Call your doctor if you think you are having a problem with your medicine. · Talk to your doctor before you start taking aspirin every day. Aspirin can help certain people lower their risk of a heart attack or stroke. But taking aspirin isn't right for everyone, because it can cause serious bleeding. · See your doctor regularly. You may need to see the doctor more often at first or until your blood pressure comes down. · If you are taking blood pressure medicine, talk to your doctor before you take decongestants or anti-inflammatory medicine, such as ibuprofen. Some of these medicines can raise blood pressure. · Learn how to check your blood pressure at home. Lifestyle changes · Stay at a healthy weight. This is especially important if you put on weight around the waist. Losing even 10 pounds can help you lower your blood pressure. · If your doctor recommends it, get more exercise. Walking is a good choice. Bit by bit, increase the amount you walk every day. Try for at least 30 minutes on most days of the week. You also may want to swim, bike, or do other activities. · Avoid or limit alcohol. Talk to your doctor about whether you can drink any alcohol.  
· Try to limit how much sodium you eat to less than 2,300 milligrams (mg) a day. Your doctor may ask you to try to eat less than 1,500 mg a day. · Eat plenty of fruits (such as bananas and oranges), vegetables, legumes, whole grains, and low-fat dairy products. · Lower the amount of saturated fat in your diet. Saturated fat is found in animal products such as milk, cheese, and meat. Limiting these foods may help you lose weight and also lower your risk for heart disease. · Do not smoke. Smoking increases your risk for heart attack and stroke. If you need help quitting, talk to your doctor about stop-smoking programs and medicines. These can increase your chances of quitting for good. When should you call for help? Call 911 anytime you think you may need emergency care. This may mean having symptoms that suggest that your blood pressure is causing a serious heart or blood vessel problem. Your blood pressure may be over 180/110. For example, call 911 if: 
· You have symptoms of a heart attack. These may include: ¨ Chest pain or pressure, or a strange feeling in the chest. 
¨ Sweating. ¨ Shortness of breath. ¨ Nausea or vomiting. ¨ Pain, pressure, or a strange feeling in the back, neck, jaw, or upper belly or in one or both shoulders or arms. ¨ Lightheadedness or sudden weakness. ¨ A fast or irregular heartbeat. · You have symptoms of a stroke. These may include: 
¨ Sudden numbness, tingling, weakness, or loss of movement in your face, arm, or leg, especially on only one side of your body. ¨ Sudden vision changes. ¨ Sudden trouble speaking. ¨ Sudden confusion or trouble understanding simple statements. ¨ Sudden problems with walking or balance. ¨ A sudden, severe headache that is different from past headaches. · You have severe back or belly pain. Do not wait until your blood pressure comes down on its own. Get help right away.  
Call your doctor now or seek immediate care if: 
· Your blood pressure is much higher than normal (such as 180/110 or higher), but you don't have symptoms. · You think high blood pressure is causing symptoms, such as: ¨ Severe headache. ¨ Blurry vision. Watch closely for changes in your health, and be sure to contact your doctor if: 
· Your blood pressure measures 140/90 or higher at least 2 times. That means the top number is 140 or higher or the bottom number is 90 or higher, or both. · You think you may be having side effects from your blood pressure medicine. · Your blood pressure is usually normal, but it goes above normal at least 2 times. Where can you learn more? Go to http://melvina-bridger.info/. Enter R538 in the search box to learn more about \"High Blood Pressure: Care Instructions. \" Current as of: August 8, 2016 Content Version: 11.2 © 3543-5470 [employer]. Care instructions adapted under license by [employer] (which disclaims liability or warranty for this information). If you have questions about a medical condition or this instruction, always ask your healthcare professional. [place] [address] any warranty or liability for your use of this information. Learning About High Blood Pressure What is high blood pressure? Blood pressure is a measure of how hard the blood pushes against the walls of your arteries. It's normal for blood pressure to go up and down throughout the day, but if it stays up, you have high blood pressure. Another name for high blood pressure is hypertension. Two numbers tell you your blood pressure. The first number is the systolic pressure. It shows how hard the blood pushes when your heart is pumping. The second number is the diastolic pressure. It shows how hard the blood pushes between heartbeats, when your heart is relaxed and filling with blood. A blood pressure of less than 120/80 (say \"120 over 80\") is ideal for an adult. High blood pressure is 140/90 or higher.  You have high blood pressure if your top number is 140 or higher or your bottom number is 90 or higher, or both. Many people fall into the category in between, called prehypertension. People with prehypertension need to make lifestyle changes to bring their blood pressure down and help prevent or delay high blood pressure. What happens when you have high blood pressure? · Blood flows through your arteries with too much force. Over time, this damages the walls of your arteries. But you can't feel it. High blood pressure usually doesn't cause symptoms. · Fat and calcium start to build up in your arteries. This buildup is called plaque. Plaque makes your arteries narrower and stiffer. Blood can't flow through them as easily. · This lack of good blood flow starts to damage some of the organs in your body. This can lead to problems such as coronary artery disease and heart attack, heart failure, stroke, kidney failure, and eye damage. How can you prevent high blood pressure? · Stay at a healthy weight. · Try to limit how much sodium you eat to less than 2,300 milligrams (mg) a day. If you limit your sodium to 1,500 mg a day, you can lower your blood pressure even more. ¨ Buy foods that are labeled \"unsalted,\" \"sodium-free,\" or \"low-sodium. \" Foods labeled \"reduced-sodium\" and \"light sodium\" may still have too much sodium. ¨ Flavor your food with garlic, lemon juice, onion, vinegar, herbs, and spices instead of salt. Do not use soy sauce, steak sauce, onion salt, garlic salt, mustard, or ketchup on your food. ¨ Use less salt (or none) when recipes call for it. You can often use half the salt a recipe calls for without losing flavor. · Be physically active. Get at least 30 minutes of exercise on most days of the week. Walking is a good choice. You also may want to do other activities, such as running, swimming, cycling, or playing tennis or team sports. · Limit alcohol to 2 drinks a day for men and 1 drink a day for women. · Eat plenty of fruits, vegetables, and low-fat dairy products. Eat less saturated and total fats. How is high blood pressure treated? · Your doctor will suggest making lifestyle changes. For example, your doctor may ask you to eat healthy foods, quit smoking, lose extra weight, and be more active. · If lifestyle changes don't help enough or your blood pressure is very high, you will have to take medicine every day. Follow-up care is a key part of your treatment and safety. Be sure to make and go to all appointments, and call your doctor if you are having problems. It's also a good idea to know your test results and keep a list of the medicines you take. Where can you learn more? Go to http://melvina-bridger.info/. Enter P501 in the search box to learn more about \"Learning About High Blood Pressure. \" Current as of: March 23, 2016 Content Version: 11.2 © 6212-3184 [employer]. Care instructions adapted under license by [employer] (which disclaims liability or warranty for this information). If you have questions about a medical condition or this instruction, always ask your healthcare professional. [place] [address] any warranty or liability for your use of this information. Shortness of Breath: Care Instructions Your Care Instructions Shortness of breath has many causes. Sometimes conditions such as anxiety can lead to shortness of breath. Some people get mild shortness of breath when they exercise. Trouble breathing also can be a symptom of a serious problem, such as asthma, lung disease, emphysema, heart problems, and pneumonia. If your shortness of breath continues, you may need tests and treatment. Watch for any changes in your breathing and other symptoms. Follow-up care is a key part of your treatment and safety.  Be sure to make and go to all appointments, and call your doctor if you are having problems. Its also a good idea to know your test results and keep a list of the medicines you take. How can you care for yourself at home? · Do not smoke or allow others to smoke around you. If you need help quitting, talk to your doctor about stop-smoking programs and medicines. These can increase your chances of quitting for good. · Get plenty of rest and sleep. · Take your medicines exactly as prescribed. Call your doctor if you think you are having a problem with your medicine. · Find healthy ways to deal with stress. ¨ Exercise daily. ¨ Get plenty of sleep. ¨ Eat regularly and well. When should you call for help? Call 911 anytime you think you may need emergency care. For example, call if: 
· You have severe shortness of breath. · You have symptoms of a heart attack. These may include: ¨ Chest pain or pressure, or a strange feeling in the chest. 
¨ Sweating. ¨ Shortness of breath. ¨ Nausea or vomiting. ¨ Pain, pressure, or a strange feeling in the back, neck, jaw, or upper belly or in one or both shoulders or arms. ¨ Lightheadedness or sudden weakness. ¨ A fast or irregular heartbeat. After you call 911, the  may tell you to chew 1 adult-strength or 2 to 4 low-dose aspirin. Wait for an ambulance. Do not try to drive yourself. Call your doctor now or seek immediate medical care if: 
· Your shortness of breath gets worse or you start to wheeze. Wheezing is a high-pitched sound when you breathe. · You wake up at night out of breath or have to prop your head up on several pillows to breathe. · You are short of breath after only light activity or while at rest. 
Watch closely for changes in your health, and be sure to contact your doctor if: 
· You do not get better over the next 1 to 2 days. Where can you learn more? Go to http://melvina-bridger.info/. Enter S780 in the search box to learn more about \"Shortness of Breath: Care Instructions. \" 
 Current as of: May 23, 2016 Content Version: 11.2 © 1504-1552 [employer]. Care instructions adapted under license by [employer] (which disclaims liability or warranty for this information). If you have questions about a medical condition or this instruction, always ask your healthcare professional. [address] any warranty or liability for your use of this information. Patient Instructions History Introducing [facility]! Paola Vincent introduces [facility] patient portal. Now you can access parts of your medical record, email your doctor's office, and request medication refills online. 1. In your internet browser, go to [email or web address] 2. Click on the First Time User? Click Here link in the Sign In box. You will see the New Member Sign Up page. 3. Enter your [facility] Access Code exactly as it appears below. You will not need to use this code after youve completed the sign-up process. If you do not sign up before the expiration date, you must request a new code. · [facility] Access Code: [email or web address] Expires: 5/29/2017  3:49 PM 
 
4. Enter the last four digits of your Social Security Number (xxxx) and Date of Birth (mm/dd/yyyy) as indicated and click Submit. You will be taken to the next sign-up page. 5. Create a [facility] ID. This will be your [facility] login ID and cannot be changed, so think of one that is secure and easy to remember. 6. Create a [facility] password. You can change your password at any time. 7. Enter your Password Reset Question and Answer. This can be used at a later time if you forget your password. 8. Enter your e-mail address. You will receive e-mail notification when new information is available in [address] 9. Click Sign Up. You can now view and download portions of your medical record. 10. Click the Download Summary menu link to download a portable copy of your medical information. If you have questions, please visit the Frequently Asked Questions section of the My Ad Boxt website. Remember, GuestD[employer]en is NOT to be used for urgent needs. For medical emergencies, dial 911. Now available from your iPhone and Android! Please provide this summary of care documentation to your next provider. Your primary care clinician is listed as Laura Clayton. If you have any questions after today's visit, please call [phone].

## 2017-04-04 LAB
ALBUMIN SERPL-MCNC: 4.4 G/DL (ref 3.5–4.7)
ALBUMIN/GLOB SERPL: 1.7 {RATIO} (ref 1.2–2.2)
ALP SERPL-CCNC: 60 IU/L (ref 39–117)
ALT SERPL-CCNC: 7 IU/L (ref 0–32)
AST SERPL-CCNC: 22 IU/L (ref 0–40)
BASOPHILS # BLD AUTO: 0.1 X10E3/UL (ref 0–0.2)
BASOPHILS NFR BLD AUTO: 1 %
BILIRUB SERPL-MCNC: 0.5 MG/DL (ref 0–1.2)
BUN SERPL-MCNC: 13 MG/DL (ref 8–27)
BUN/CREAT SERPL: 11 (ref 12–28)
CALCIUM SERPL-MCNC: 11 MG/DL (ref 8.7–10.3)
CHLORIDE SERPL-SCNC: 102 MMOL/L (ref 96–106)
CO2 SERPL-SCNC: 29 MMOL/L (ref 18–29)
CREAT SERPL-MCNC: 1.17 MG/DL (ref 0.57–1)
EOSINOPHIL # BLD AUTO: 0.2 X10E3/UL (ref 0–0.4)
EOSINOPHIL NFR BLD AUTO: 4 %
ERYTHROCYTE [DISTWIDTH] IN BLOOD BY AUTOMATED COUNT: 14.2 % (ref 12.3–15.4)
GLOBULIN SER CALC-MCNC: 2.6 G/DL (ref 1.5–4.5)
GLUCOSE SERPL-MCNC: 95 MG/DL (ref 65–99)
HCT VFR BLD AUTO: 37.2 % (ref 34–46.6)
HGB BLD-MCNC: 12.4 G/DL (ref 11.1–15.9)
IMM GRANULOCYTES # BLD: 0 X10E3/UL (ref 0–0.1)
IMM GRANULOCYTES NFR BLD: 0 %
INTERPRETATION: NORMAL
LYMPHOCYTES # BLD AUTO: 1.4 X10E3/UL (ref 0.7–3.1)
LYMPHOCYTES NFR BLD AUTO: 34 %
MCH RBC QN AUTO: 29.9 PG (ref 26.6–33)
MCHC RBC AUTO-ENTMCNC: 33.3 G/DL (ref 31.5–35.7)
MCV RBC AUTO: 90 FL (ref 79–97)
MONOCYTES # BLD AUTO: 0.3 X10E3/UL (ref 0.1–0.9)
MONOCYTES NFR BLD AUTO: 8 %
NEUTROPHILS # BLD AUTO: 2.1 X10E3/UL (ref 1.4–7)
NEUTROPHILS NFR BLD AUTO: 53 %
PLATELET # BLD AUTO: 229 X10E3/UL (ref 150–379)
POTASSIUM SERPL-SCNC: 4.4 MMOL/L (ref 3.5–5.2)
PROT SERPL-MCNC: 7 G/DL (ref 6–8.5)
RBC # BLD AUTO: 4.15 X10E6/UL (ref 3.77–5.28)
SODIUM SERPL-SCNC: 143 MMOL/L (ref 134–144)
WBC # BLD AUTO: 4 X10E3/UL (ref 3.4–10.8)

## 2017-04-04 NOTE — PROGRESS NOTES
HISTORY OF PRESENT ILLNESS  Saintclair Baxter is a 80 y.o. female here for follow up on hypertension. Patient Active Problem List   Diagnosis Code    Dyspnea on exertion R06.09    Lupus erythematosus L93.0    Sjogren's syndrome ([facility] [address] M35.00    Chronic airway obstruction ([facility] [address] J44.9    Hypertensive heart disease I11.9    Dyslipidemia E78.5    Coronary atherosclerosis of native coronary artery I25.10    Lymphoma, non-Hodgkin's ([facility]) C85.90    Hypercholesterolemia E78.00    Essential hypertension, benign I10    Left displaced femoral neck fracture ([facility]) S72.002A    Diffuse large B-cell lymphoma of lymph nodes of multiple regions ([facility] [address] C83.38    Pulmonary fibrosis ([facility]) J84.10    Status post angioplasty with stent Z95.9    Essential hypertension with goal blood pressure less than 140/90 I10    Mixed hyperlipidemia E78.2    Lupus (systemic lupus erythematosus) ([facility]) M32.9    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 30 Tab 1    donepezil (ARICEPT) 5 mg tablet Take 1 Tab by mouth nightly. 30 Tab 0    KLOR-CON M10 10 mEq tablet TAKE ONE TABLET BY MOUTH DAILY 30 Tab 2    acetaminophen-codeine (TYLENOL-CODEINE #3) 300-30 mg per tablet Take 1 Tab by mouth every four (4) hours as needed for Pain. Max Daily Amount: 6 Tabs. 30 Tab 0    metoprolol tartrate (LOPRESSOR) 50 mg tablet Take 1 Tab by mouth two (2) times a day.  (Patient taking differently: Take 50 mg by mouth daily.) 60 Tab 0    cholecalciferol (VITAMIN D3) 1,000 unit cap Take  by mouth daily.  divalproex DR (DEPAKOTE) 250 mg tablet Take 1 Tab by mouth two (2) times a day. 60 Tab 3    multivitamin (ONE A DAY) tablet Take 1 Tab by mouth daily.  diclofenac (VOLTAREN) 1 % gel Apply 2 g to affected area four (4) times daily. Apply on left knee 4 times a day. 4 g 1    aspirin (ASPIRIN) 325 mg tablet Take 1 Tab by mouth daily. 30 Tab 1    albuterol (PROAIR HFA) 90 mcg/actuation inhaler Take  by inhalation.  ranitidine (ZANTAC) 150 mg tablet Take 1 Tab by mouth daily. 30 Tab 0    polyethylene glycol (MIRALAX) 17 gram packet Take 17 g by mouth daily. No current facility-administered medications on file prior to visit. Past Medical History:   Diagnosis Date    CAD (coronary artery disease)     Chronic    Chronic airway obstruction, not elsewhere classified     Frequent cough, wheezing secondary to lupus and COPD    Closed left hip fracture (HCC)     Decubitus ulcer of sacral region, stage 3 (HCC) 8/18/2016    Dyspnea on exertion     Echocardiogram 12/02/2010    EF 60-65%. Gr 1 DDfx. Mild LVH. Mild MR.    Essential hypertension, benign     GERD (gastroesophageal reflux disease)     Headache     History of myocardial perfusion scan 09/14/2012    No evidence of ischemia or infarction. Very mild apical thinning. EF 77%. No WMA. TID 1.46, suggests 3-vessel CAD. Intermediate to high risk pharm stress test due to TID.  Hypercholesterolemia     Lower extremity venous duplex 05/08/2013    Left leg:  No venous thrombosis or venous insufficiency.  Lupus erythematosus 1992    Lymphoma, non-Hodgkin's ([facility] [address] 5/2011    Low-grade being followed by Dr. Vanessa Pina without therapy currently    Pneumothorax 1981    Blebs in right lung with recurrent Pneumothorax    S/P cardiac cath 09/24/2012    Hvy calcification of coronary arteries.   LM minimal.  mLAD 35%.  oD2 70% (unchanged). LCX mild. pRCA mild. Minimal ISR of prev stent. LVEDP 14-16. EF 65%. No WMA. Likely a falsely abnormal stress test.    Short-term memory loss     From fall in 2003    Sjogren's syndrome ([facility] [address] 1992    report of ROGELIO, SSA, RF positive    Traumatic subdural hematoma ([facility] [address] 2003    Head injury with subdural - s/p fall     Past Surgical History:   Procedure Laterality Date    CARDIAC SURG PROCEDURE UNLIST      cardiac stent    CHEST SURGERY PROCEDURE UNLISTED  1981    RUL removal    HX ADENOIDECTOMY  11/2013    eyelids lifted    HX CATARACT REMOVAL Bilateral     w/ lens implants    HX CORONARY STENT PLACEMENT  8/21/2007    HX HEART CATHETERIZATION  9/24/2012    HX HEART CATHETERIZATION  8/21/2007    [facility]useb[facility] - stent placed    HX HEENT  2003    subdural hematoma removed s/p fall    HX HYSTERECTOMY  1974    HX LOBECTOMY Right     upper portion    HX PNEUMONECTOMY      partial    NEUROLOGICAL PROCEDURE UNLISTED      subdural hematoma     Social History     Social History    Marital status:      Spouse name: N/A    Number of children: N/A    Years of education: N/A     Occupational History    Not on file. Social History Main Topics    Smoking status: Former Smoker     Quit date: 1/1/1981    Smokeless tobacco: Never Used    Alcohol use Yes      Comment: once a month drinks wine    Drug use: No    Sexual activity: Not Currently     Other Topics Concern    Not on file     Social History Narrative    ** Merged History Encounter **          Family History   Problem Relation Age of Onset    Cancer Father     Cancer Brother      This note will not be viewable in [address] If Narcotics or controlled substances were prescribed, I personally reviewed the patient  history. HPI   Patient presents with 2 daughters stating that she \"didn't feel right this morning\" and \"was shaky\". Per patient, she states she \"just didn't feel like myself\".  She and her daughters confirm that they thought it was her blood pressure but readings were below 140/90 (average in the 130's). The fingerstick today shows blood glucose of 77. I am concerned that since she is not eating that her blood sugar is running low. She had some fried apples and her blood glucose is only 77. She admits she might eat once or twice per day and family reports that she does not eat a lot. She had a couple of spoonfuls of grits and the fried apples but nothing else all day. She is still having constipation but she also has decreased fluid intake. Patient is still having low back pain for which she received steroid injections 2+weeks ago from orthopedics. Her examination today reveals no acute abnormalities. She is in no acute distress and per daughters, the patient has not improved on the 5 mg of Namenda. They want to ask her neurologist for a \"higher dose\" or for \"other meds too\". We discussed the prognosis and likely progression of the disease. They have no additional acute concerns. We will obtain basic labs today at family request. Patient is denying chest pain, shortness of breath, abdominal pain, nausea/vomiting, headache, visual changes or acute weakness. Review of Systems   Constitutional: Positive for malaise/fatigue (chronic). Negative for chills and fever. Eyes: Negative for blurred vision and double vision. Respiratory: Negative for cough and shortness of breath. Cardiovascular: Negative for chest pain and palpitations. Gastrointestinal: Positive for constipation (chronic). Negative for abdominal pain, diarrhea, nausea and vomiting. Genitourinary: Negative for dysuria, frequency and urgency. Musculoskeletal: Positive for back pain (chronic) and joint pain (hip, right). Negative for myalgias and neck pain. Neurological: Positive for weakness (chronic).  Negative for dizziness, tingling, tremors, sensory change, speech change, focal weakness, seizures, loss of consciousness and headaches. Psychiatric/Behavioral: Negative for depression, hallucinations, substance abuse and suicidal ideas. The patient has insomnia. Physical Exam   Constitutional: Vital signs are normal. She appears well-developed. She is cooperative. Non-toxic appearance. She does not have a sickly appearance. She does not appear ill. No distress. Patient appears frail   Neck: Normal range of motion. Neck supple. Cardiovascular: Normal rate, regular rhythm and normal heart sounds. Pulmonary/Chest: Effort normal and breath sounds normal. No respiratory distress. Abdominal: Soft. Normal appearance. She exhibits no distension. Bowel sounds are increased. There is no tenderness. There is no rigidity and no guarding. Musculoskeletal: She exhibits no edema. Lymphadenopathy:     She has no cervical adenopathy. Neurological: She is alert. She displays atrophy (mild muscle atrophy bilaterally). No cranial nerve deficit or sensory deficit. Gait (uses rollator) abnormal. GCS eye subscore is 4. GCS verbal subscore is 5. GCS motor subscore is 6. Skin: Skin is warm and dry. She is not diaphoretic. Psychiatric: She has a normal mood and affect. Her behavior is normal.   Nursing note and vitals reviewed. ASSESSMENT and PLAN    ICD-10-CM ICD-9-CM    1. Essential hypertension, benign G03 108.9 METABOLIC PANEL, COMPREHENSIVE      CBC WITH AUTOMATED DIFF   2. Loss of appetite R63.0 783.0    3. Chronic midline low back pain without sciatica M54.5 724.2     G89.29 338.29    4. Other constipation K59.09 564.09    5. Dyspnea on exertion R06.09 786.09    Vital signs here are normal. Blood glucose 77. Discussed with family/patient that she should be increasing her intake and trying to eat at least 3-4 times daily with goal of 5-6 times (small meals) daily. Encouraged increased fluids and overuse of stimulants for constipation.  Encouraged close follow up with orthopedics regarding her back pain and steroid injections. Follow up with Dr. Roma Ward as scheduled and return for any acute concerns or symptoms. Call with questions or concerns. Patient and family expressed understanding of instructions and agreement with treatment plan.

## 2017-04-04 NOTE — PROGRESS NOTES
Spoke with pt daughter and she voiced understanding of plan of care. They will f/u in one week and report progress.

## 2017-04-04 NOTE — PATIENT INSTRUCTIONS
Thank you for choosing [address] [facility]. We know you have many options when it comes to your healthcare; we appreciate that you picked us. Our goal is to provide exceptional  service and world class care for every patient. You may receive a survey in the mail or by email asking for your feedback. Please take a few minutes to share your thoughts about your recent visit. Your comments helps us understand what we do well and what we can do better. To ensure confidentiality, this survey is administered by an independent third-party, [address] [place] [address] participation will help us to improve the quality of care that we provide to you, your family, friends, and neighbors. Thank you! Chronic Pain: Care Instructions  Your Care Instructions  Chronic pain is pain that lasts a long time (months or even years) and may or may not have a clear cause. It is different from acute pain, which usually does have a clear cause--like an injury or illness--and gets better over time. Chronic pain:  · Lasts over time but may vary from day to day. · Does not go away despite efforts to end it. · May disrupt your sleep and lead to fatigue. · May cause depression or anxiety. · May make your muscles tense, causing more pain. · Can disrupt your work, hobbies, home life, and relationships with friends and family. Chronic pain is a very real condition. It is not just in your head. Treatment can help and usually includes several methods used together, such as medicines, physical therapy, exercise, and other treatments. Learning how to relax and changing negative thought patterns can also help you cope. Chronic pain is complex. Taking an active role in your treatment will help you better manage your pain. Tell your doctor if you have trouble dealing with your pain. You may have to try several things before you find what works best for you. Follow-up care is a key part of your treatment and safety.  Be sure to make and go to all appointments, and call your doctor if you are having problems. Its also a good idea to know your test results and keep a list of the medicines you take. How can you care for yourself at home? · Pace yourself. Break up large jobs into smaller tasks. Save harder tasks for days when you have less pain, or go back and forth between hard tasks and easier ones. Take rest breaks. · Relax, and reduce stress. Relaxation techniques such as deep breathing or meditation can help. · Keep moving. Gentle, daily exercise can help reduce pain over the long run. Try low- or no-impact exercises such as walking, swimming, and stationary biking. Do stretches to stay flexible. · Try heat, cold packs, and massage. · Get enough sleep. Chronic pain can make you tired and drain your energy. Talk with your doctor if you have trouble sleeping because of pain. · Think positive. Your thoughts can affect your pain level. Do things that you enjoy to distract yourself when you have pain instead of focusing on the pain. See a movie, read a book, listen to music, or spend time with a friend. · If you think you are depressed, talk to your doctor about treatment. · Keep a daily pain diary. Record how your moods, thoughts, sleep patterns, activities, and medicine affect your pain. You may find that your pain is worse during or after certain activities or when you are feeling a certain emotion. Having a record of your pain can help you and your doctor find the best ways to treat your pain. · Take pain medicines exactly as directed. ¨ If the doctor gave you a prescription medicine for pain, take it as prescribed. ¨ If you are not taking a prescription pain medicine, ask your doctor if you can take an over-the-counter medicine. Reducing constipation caused by pain medicine  · Include fruits, vegetables, beans, and whole grains in your diet each day. These foods are high in fiber.   · Drink plenty of fluids, enough so that your urine is light yellow or clear like water. If you have kidney, heart, or liver disease and have to limit fluids, talk with your doctor before you increase the amount of fluids you drink. · If your doctor recommends it, get more exercise. Walking is a good choice. Bit by bit, increase the amount you walk every day. Try for at least 30 minutes on most days of the week. · Schedule time each day for a bowel movement. A daily routine may help. Take your time and do not strain when having a bowel movement. When should you call for help? Call your doctor now or seek immediate medical care if:  · Your pain gets worse or is out of control. · You feel down or blue, or you do not enjoy things like you once did. You may be depressed, which is common in people with chronic pain. Depression can be treated. · You have vomiting or cramps for more than 2 hours. Watch closely for changes in your health, and be sure to contact your doctor if:  · You cannot sleep because of pain. · You are very worried or anxious about your pain. · You have trouble taking your pain medicine. · You have any concerns about your pain medicine. · You have trouble with bowel movements, such as:  ¨ No bowel movement in 3 days. ¨ Blood in the anal area, in your stool, or on the toilet paper. ¨ Diarrhea for more than 24 hours. Where can you learn more? Go to http://melvina-bridger.info/. Enter N004 in the search box to learn more about \"Chronic Pain: Care Instructions. \"  Current as of: October 14, 2016  Content Version: 11.2  © 7765-7255 [employer]. Care instructions adapted under license by [employer] (which disclaims liability or warranty for this information). If you have questions about a medical condition or this instruction, always ask your healthcare professional. [address] any warranty or liability for your use of this information.        Back Pain: Care Instructions  Your Care Instructions    Back pain has many possible causes. It is often related to problems with muscles and ligaments of the back. It may also be related to problems with the nerves, discs, or bones of the back. Moving, lifting, standing, sitting, or sleeping in an awkward way can strain the back. Sometimes you don't notice the injury until later. Arthritis is another common cause of back pain. Although it may hurt a lot, back pain usually improves on its own within several weeks. Most people recover in 12 weeks or less. Using good home treatment and being careful not to stress your back can help you feel better sooner. Follow-up care is a key part of your treatment and safety. Be sure to make and go to all appointments, and call your doctor if you are having problems. Its also a good idea to know your test results and keep a list of the medicines you take. How can you care for yourself at home? · Sit or lie in positions that are most comfortable and reduce your pain. Try one of these positions when you lie down:  ¨ Lie on your back with your knees bent and supported by large pillows. ¨ Lie on the floor with your legs on the seat of a sofa or chair. [place] on your side with your knees and hips bent and a pillow between your legs. ¨ Lie on your stomach if it does not make pain worse. · Do not sit up in bed, and avoid soft couches and twisted positions. Bed rest can help relieve pain at first, but it delays healing. Avoid bed rest after the first day of back pain. · Change positions every 30 minutes. If you must sit for long periods of time, take breaks from sitting. Get up and walk around, or lie in a comfortable position. · Try using a heating pad on a low or medium setting for 15 to 20 minutes every 2 or 3 hours. Try a warm shower in place of one session with the heating pad. · You can also try an ice pack for 10 to 15 minutes every 2 to 3 hours.  Put a thin cloth between the ice pack and your skin. · Take pain medicines exactly as directed. ¨ If the doctor gave you a prescription medicine for pain, take it as prescribed. ¨ If you are not taking a prescription pain medicine, ask your doctor if you can take an over-the-counter medicine. · Take short walks several times a day. You can start with 5 to 10 minutes, 3 or 4 times a day, and work up to longer walks. Walk on level surfaces and avoid hills and stairs until your back is better. · Return to work and other activities as soon as you can. Continued rest without activity is usually not good for your back. · To prevent future back pain, do exercises to stretch and strengthen your back and stomach. Learn how to use good posture, safe lifting techniques, and proper body mechanics. When should you call for help? Call your doctor now or seek immediate medical care if:  · You have new or worsening numbness in your legs. · You have new or worsening weakness in your legs. (This could make it hard to stand up.)  · You lose control of your bladder or bowels. Watch closely for changes in your health, and be sure to contact your doctor if:  · Your pain gets worse. · You are not getting better after 2 weeks. Where can you learn more? Go to http://melvina-bridger.info/. Enter M841 in the search box to learn more about \"Back Pain: Care Instructions. \"  Current as of: May 23, 2016  Content Version: 11.2  © 9534-1817 [employer]. Care instructions adapted under license by [employer] (which disclaims liability or warranty for this information). If you have questions about a medical condition or this instruction, always ask your healthcare professional. [address] any warranty or liability for your use of this information. DASH Diet: Care Instructions  Your Care Instructions  The DASH diet is an eating plan that can help lower your blood pressure.  DASH stands for Dietary Approaches to Stop Hypertension. Hypertension is high blood pressure. The DASH diet focuses on eating foods that are high in calcium, potassium, and magnesium. These nutrients can lower blood pressure. The foods that are highest in these nutrients are fruits, vegetables, low-fat dairy products, nuts, seeds, and legumes. But taking calcium, potassium, and magnesium supplements instead of eating foods that are high in those nutrients does not have the same effect. The DASH diet also includes whole grains, fish, and poultry. The DASH diet is one of several lifestyle changes your doctor may recommend to lower your high blood pressure. Your doctor may also want you to decrease the amount of sodium in your diet. Lowering sodium while following the DASH diet can lower blood pressure even further than just the DASH diet alone. Follow-up care is a key part of your treatment and safety. Be sure to make and go to all appointments, and call your doctor if you are having problems. It's also a good idea to know your test results and keep a list of the medicines you take. How can you care for yourself at home? Following the DASH diet  · Eat 4 to 5 servings of fruit each day. A serving is 1 medium-sized piece of fruit, ½ cup chopped or canned fruit, 1/4 cup dried fruit, or 4 ounces (½ cup) of fruit juice. Choose fruit more often than fruit juice. · Eat 4 to 5 servings of vegetables each day. A serving is 1 cup of lettuce or raw leafy vegetables, ½ cup of chopped or cooked vegetables, or 4 ounces (½ cup) of vegetable juice. Choose vegetables more often than vegetable juice. · Get 2 to 3 servings of low-fat and fat-free dairy each day. A serving is 8 ounces of milk, 1 cup of yogurt, or 1 ½ ounces of cheese. · Eat 6 to 8 servings of grains each day. A serving is 1 slice of bread, 1 ounce of dry cereal, or ½ cup of cooked rice, pasta, or cooked cereal. Try to choose whole-grain products as much as possible.   · Limit lean meat, poultry, and fish to 2 servings each day. A serving is 3 ounces, about the size of a deck of cards. · Eat 4 to 5 servings of nuts, seeds, and legumes (cooked dried beans, lentils, and split peas) each week. A serving is 1/3 cup of nuts, 2 tablespoons of seeds, or ½ cup of cooked beans or peas. · Limit fats and oils to 2 to 3 servings each day. A serving is 1 teaspoon of vegetable oil or 2 tablespoons of salad dressing. · Limit sweets and added sugars to 5 servings or less a week. A serving is 1 tablespoon jelly or jam, ½ cup sorbet, or 1 cup of lemonade. · Eat less than 2,300 milligrams (mg) of sodium a day. If you limit your sodium to 1,500 mg a day, you can lower your blood pressure even more. Tips for success  · Start small. Do not try to make dramatic changes to your diet all at once. You might feel that you are missing out on your favorite foods and then be more likely to not follow the plan. Make small changes, and stick with them. Once those changes become habit, add a few more changes. · Try some of the following:  ¨ Make it a goal to eat a fruit or vegetable at every meal and at snacks. This will make it easy to get the recommended amount of fruits and vegetables each day. ¨ Try yogurt topped with fruit and nuts for a snack or healthy dessert. ¨ Add lettuce, tomato, cucumber, and onion to sandwiches. ¨ Combine a ready-made pizza crust with low-fat mozzarella cheese and lots of vegetable toppings. Try using tomatoes, squash, spinach, broccoli, carrots, cauliflower, and onions. ¨ Have a variety of cut-up vegetables with a low-fat dip as an appetizer instead of chips and dip. ¨ Sprinkle sunflower seeds or chopped almonds over salads. Or try adding chopped walnuts or almonds to cooked vegetables. ¨ Try some vegetarian meals using beans and peas. Add garbanzo or kidney beans to salads. Make burritos and tacos with mashed sanderson beans or black beans. Where can you learn more?   Go to http://melvina-bridger.info/. Enter K903 in the search box to learn more about \"DASH Diet: Care Instructions. \"  Current as of: March 23, 2016  Content Version: 11.2  © 6787-0878 [employer]. Care instructions adapted under license by [employer] (which disclaims liability or warranty for this information). If you have questions about a medical condition or this instruction, always ask your healthcare professional. [place] [address] any warranty or liability for your use of this information. High Blood Pressure: Care Instructions  Your Care Instructions  If your blood pressure is usually above 140/90, you have high blood pressure, or hypertension. That means the top number is 140 or higher or the bottom number is 90 or higher, or both. Despite what a lot of people think, high blood pressure usually doesn't cause headaches or make you feel dizzy or lightheaded. It usually has no symptoms. But it does increase your risk for heart attack, stroke, and kidney or eye damage. The higher your blood pressure, the more your risk increases. Your doctor will give you a goal for your blood pressure. Your goal will be based on your health and your age. An example of a goal is to keep your blood pressure below 140/90. Lifestyle changes, such as eating healthy and being active, are always important to help lower blood pressure. You might also take medicine to reach your blood pressure goal.  Follow-up care is a key part of your treatment and safety. Be sure to make and go to all appointments, and call your doctor if you are having problems. It's also a good idea to know your test results and keep a list of the medicines you take. How can you care for yourself at home? Medical treatment  · If you stop taking your medicine, your blood pressure will go back up. You may take one or more types of medicine to lower your blood pressure. Be safe with medicines.  Take your medicine exactly as prescribed. Call your doctor if you think you are having a problem with your medicine. · Talk to your doctor before you start taking aspirin every day. Aspirin can help certain people lower their risk of a heart attack or stroke. But taking aspirin isn't right for everyone, because it can cause serious bleeding. · See your doctor regularly. You may need to see the doctor more often at first or until your blood pressure comes down. · If you are taking blood pressure medicine, talk to your doctor before you take decongestants or anti-inflammatory medicine, such as ibuprofen. Some of these medicines can raise blood pressure. · Learn how to check your blood pressure at home. Lifestyle changes  · Stay at a healthy weight. This is especially important if you put on weight around the waist. Losing even 10 pounds can help you lower your blood pressure. · If your doctor recommends it, get more exercise. Walking is a good choice. Bit by bit, increase the amount you walk every day. Try for at least 30 minutes on most days of the week. You also may want to swim, bike, or do other activities. · Avoid or limit alcohol. Talk to your doctor about whether you can drink any alcohol. · Try to limit how much sodium you eat to less than 2,300 milligrams (mg) a day. Your doctor may ask you to try to eat less than 1,500 mg a day. · Eat plenty of fruits (such as bananas and oranges), vegetables, legumes, whole grains, and low-fat dairy products. · Lower the amount of saturated fat in your diet. Saturated fat is found in animal products such as milk, cheese, and meat. Limiting these foods may help you lose weight and also lower your risk for heart disease. · Do not smoke. Smoking increases your risk for heart attack and stroke. If you need help quitting, talk to your doctor about stop-smoking programs and medicines. These can increase your chances of quitting for good. When should you call for help?   Call [phone] anytime you think you may need emergency care. This may mean having symptoms that suggest that your blood pressure is causing a serious heart or blood vessel problem. Your blood pressure may be over 180/110. For example, call 911 if:  · You have symptoms of a heart attack. These may include:  ¨ Chest pain or pressure, or a strange feeling in the chest.  ¨ Sweating. ¨ Shortness of breath. ¨ Nausea or vomiting. ¨ Pain, pressure, or a strange feeling in the back, neck, jaw, or upper belly or in one or both shoulders or arms. ¨ Lightheadedness or sudden weakness. ¨ A fast or irregular heartbeat. · You have symptoms of a stroke. These may include:  ¨ Sudden numbness, tingling, weakness, or loss of movement in your face, arm, or leg, especially on only one side of your body. ¨ Sudden vision changes. ¨ Sudden trouble speaking. ¨ Sudden confusion or trouble understanding simple statements. ¨ Sudden problems with walking or balance. ¨ A sudden, severe headache that is different from past headaches. · You have severe back or belly pain. Do not wait until your blood pressure comes down on its own. Get help right away. Call your doctor now or seek immediate care if:  · Your blood pressure is much higher than normal (such as 180/110 or higher), but you don't have symptoms. · You think high blood pressure is causing symptoms, such as:  ¨ Severe headache. ¨ Blurry vision. Watch closely for changes in your health, and be sure to contact your doctor if:  · Your blood pressure measures 140/90 or higher at least 2 times. That means the top number is 140 or higher or the bottom number is 90 or higher, or both. · You think you may be having side effects from your blood pressure medicine. · Your blood pressure is usually normal, but it goes above normal at least 2 times. Where can you learn more? Go to http://melvina-bridger.info/.   Enter L772 in the search box to learn more about \"High Blood Pressure: Care Instructions. \"  Current as of: August 8, 2016  Content Version: 11.2  © 9418-6618 [employer]. Care instructions adapted under license by [employer] (which disclaims liability or warranty for this information). If you have questions about a medical condition or this instruction, always ask your healthcare professional. [address] any warranty or liability for your use of this information. Learning About High Blood Pressure  What is high blood pressure? Blood pressure is a measure of how hard the blood pushes against the walls of your arteries. It's normal for blood pressure to go up and down throughout the day, but if it stays up, you have high blood pressure. Another name for high blood pressure is hypertension. Two numbers tell you your blood pressure. The first number is the systolic pressure. It shows how hard the blood pushes when your heart is pumping. The second number is the diastolic pressure. It shows how hard the blood pushes between heartbeats, when your heart is relaxed and filling with blood. A blood pressure of less than 120/80 (say \"120 over 80\") is ideal for an adult. High blood pressure is 140/90 or higher. You have high blood pressure if your top number is 140 or higher or your bottom number is 90 or higher, or both. Many people fall into the category in between, called prehypertension. People with prehypertension need to make lifestyle changes to bring their blood pressure down and help prevent or delay high blood pressure. What happens when you have high blood pressure? · Blood flows through your arteries with too much force. Over time, this damages the walls of your arteries. But you can't feel it. High blood pressure usually doesn't cause symptoms. · Fat and calcium start to build up in your arteries. This buildup is called plaque. Plaque makes your arteries narrower and stiffer.  Blood can't flow through them as easily. · This lack of good blood flow starts to damage some of the organs in your body. This can lead to problems such as coronary artery disease and heart attack, heart failure, stroke, kidney failure, and eye damage. How can you prevent high blood pressure? · Stay at a healthy weight. · Try to limit how much sodium you eat to less than 2,300 milligrams (mg) a day. If you limit your sodium to 1,500 mg a day, you can lower your blood pressure even more. ¨ Buy foods that are labeled \"unsalted,\" \"sodium-free,\" or \"low-sodium. \" Foods labeled \"reduced-sodium\" and \"light sodium\" may still have too much sodium. ¨ Flavor your food with garlic, lemon juice, onion, vinegar, herbs, and spices instead of salt. Do not use soy sauce, steak sauce, onion salt, garlic salt, mustard, or ketchup on your food. ¨ Use less salt (or none) when recipes call for it. You can often use half the salt a recipe calls for without losing flavor. · Be physically active. Get at least 30 minutes of exercise on most days of the week. Walking is a good choice. You also may want to do other activities, such as running, swimming, cycling, or playing tennis or team sports. · Limit alcohol to 2 drinks a day for men and 1 drink a day for women. · Eat plenty of fruits, vegetables, and low-fat dairy products. Eat less saturated and total fats. How is high blood pressure treated? · Your doctor will suggest making lifestyle changes. For example, your doctor may ask you to eat healthy foods, quit smoking, lose extra weight, and be more active. · If lifestyle changes don't help enough or your blood pressure is very high, you will have to take medicine every day. Follow-up care is a key part of your treatment and safety. Be sure to make and go to all appointments, and call your doctor if you are having problems. It's also a good idea to know your test results and keep a list of the medicines you take. Where can you learn more?   Go to http://melvina-bridger.info/. Enter P501 in the search box to learn more about \"Learning About High Blood Pressure. \"  Current as of: March 23, 2016  Content Version: 11.2  © 5480-9717 [employer]. Care instructions adapted under license by [employer] (which disclaims liability or warranty for this information). If you have questions about a medical condition or this instruction, always ask your healthcare professional. [place] [address] any warranty or liability for your use of this information. Shortness of Breath: Care Instructions  Your Care Instructions  Shortness of breath has many causes. Sometimes conditions such as anxiety can lead to shortness of breath. Some people get mild shortness of breath when they exercise. Trouble breathing also can be a symptom of a serious problem, such as asthma, lung disease, emphysema, heart problems, and pneumonia. If your shortness of breath continues, you may need tests and treatment. Watch for any changes in your breathing and other symptoms. Follow-up care is a key part of your treatment and safety. Be sure to make and go to all appointments, and call your doctor if you are having problems. Its also a good idea to know your test results and keep a list of the medicines you take. How can you care for yourself at home? · Do not smoke or allow others to smoke around you. If you need help quitting, talk to your doctor about stop-smoking programs and medicines. These can increase your chances of quitting for good. · Get plenty of rest and sleep. · Take your medicines exactly as prescribed. Call your doctor if you think you are having a problem with your medicine. · Find healthy ways to deal with stress. ¨ Exercise daily. ¨ Get plenty of sleep. ¨ Eat regularly and well. When should you call for help? Call 911 anytime you think you may need emergency care.  For example, call if:  · You have severe shortness of breath. · You have symptoms of a heart attack. These may include:  ¨ Chest pain or pressure, or a strange feeling in the chest.  ¨ Sweating. ¨ Shortness of breath. ¨ Nausea or vomiting. ¨ Pain, pressure, or a strange feeling in the back, neck, jaw, or upper belly or in one or both shoulders or arms. ¨ Lightheadedness or sudden weakness. ¨ A fast or irregular heartbeat. After you call 911, the  may tell you to chew 1 adult-strength or 2 to 4 low-dose aspirin. Wait for an ambulance. Do not try to drive yourself. Call your doctor now or seek immediate medical care if:  · Your shortness of breath gets worse or you start to wheeze. Wheezing is a high-pitched sound when you breathe. · You wake up at night out of breath or have to prop your head up on several pillows to breathe. · You are short of breath after only light activity or while at rest.  Watch closely for changes in your health, and be sure to contact your doctor if:  · You do not get better over the next 1 to 2 days. Where can you learn more? Go to http://melvina-bridger.info/. Enter S780 in the search box to learn more about \"Shortness of Breath: Care Instructions. \"  Current as of: May 23, 2016  Content Version: 11.2  © 7716-3075 [employer]. Care instructions adapted under license by [employer] (which disclaims liability or warranty for this information). If you have questions about a medical condition or this instruction, always ask your healthcare professional. [place] [address] any warranty or liability for your use of this information.

## 2017-04-04 NOTE — PROGRESS NOTES
Labs are unchanged from previous labs. Follow with [facility] as scheduled. Push foods/fluids. Try to eat at lest 5-6 times daily.

## 2017-04-05 ENCOUNTER — TELEPHONE (OUTPATIENT)
Dept: NEUROLOGY | Age: 82
End: 2017-04-05

## 2017-04-05 RX ORDER — MEMANTINE HYDROCHLORIDE 5 MG-10 MG
KIT ORAL
Qty: 30 TAB | Refills: 0 | Status: SHIPPED | OUTPATIENT
Start: 2017-04-05 | End: 2017-04-27

## 2017-04-05 NOTE — TELEPHONE ENCOUNTER
Pt's daughter calling because she doesn't understand why she wasn't called before any medication was sent in to the pharmacy. Please give her a call back. She said the medication cannot be filled until Friday because the pharmacy is out.

## 2017-04-05 NOTE — TELEPHONE ENCOUNTER
Pt daughter calling, the Aricpet is causing pt to have an upset stomach, pt no longer wants to take medication, body is still not adjusting.

## 2017-04-18 ENCOUNTER — TELEPHONE (OUTPATIENT)
Dept: NEUROLOGY | Age: 82
End: 2017-04-18

## 2017-04-18 NOTE — TELEPHONE ENCOUNTER
Discussed with daughter. Advised to discontinue Namenda. Side effects should clear out in the next 1-2 days.

## 2017-04-18 NOTE — TELEPHONE ENCOUNTER
Pt family calling, Pt personality has changed, very angry, verbally striking out, suffering weight loss, hallucinations. Spoke with Dr. Fernando Blood and was told to call Dr. Darci Ardon, thinks it could be from the [facility], [place], operated by [facility]. Pt family would like a call back as soon as possible, they are very concerned about her.

## 2017-04-18 NOTE — TELEPHONE ENCOUNTER
Pt's daughter calling again because they are very concerned about the pt. She is having major side effect from the Trbonje. Please give them a call back as soon as possible.

## 2017-04-25 ENCOUNTER — TELEPHONE (OUTPATIENT)
Dept: NEUROLOGY | Age: 82
End: 2017-04-25

## 2017-04-25 NOTE — TELEPHONE ENCOUNTER
Pt's daughter calling because pt hasn't eaten in days or taken her medication in several days. She is paranoid, constantly arguing with people. She is also seeing things.  Please give her a call back

## 2017-04-27 ENCOUNTER — APPOINTMENT (OUTPATIENT)
Dept: GENERAL RADIOLOGY | Age: 82
End: 2017-04-27
Attending: EMERGENCY MEDICINE
Payer: MEDICARE

## 2017-04-27 ENCOUNTER — HOSPITAL ENCOUNTER (EMERGENCY)
Age: 82
Discharge: HOME | End: 2017-04-27
Attending: EMERGENCY MEDICINE
Payer: MEDICARE

## 2017-04-27 ENCOUNTER — PATIENT OUTREACH (OUTPATIENT)
Dept: INTERNAL MEDICINE CLINIC | Age: 82
End: 2017-04-27

## 2017-04-27 ENCOUNTER — APPOINTMENT (OUTPATIENT)
Dept: CT IMAGING | Age: 82
End: 2017-04-27
Attending: EMERGENCY MEDICINE
Payer: MEDICARE

## 2017-04-27 VITALS
BODY MASS INDEX: 22.62 KG/M2 | HEART RATE: 109 BPM | RESPIRATION RATE: 16 BRPM | TEMPERATURE: 98 F | HEIGHT: 61 IN | SYSTOLIC BLOOD PRESSURE: 162 MMHG | DIASTOLIC BLOOD PRESSURE: 88 MMHG | WEIGHT: 119.8 LBS | OXYGEN SATURATION: 98 %

## 2017-04-27 DIAGNOSIS — F22 PARANOID IDEATION (HCC): Primary | ICD-10-CM

## 2017-04-27 LAB
ALBUMIN SERPL BCP-MCNC: 3.7 G/DL (ref 3.5–5)
ALBUMIN/GLOB SERPL: 1.1 {RATIO} (ref 1.1–2.2)
ALP SERPL-CCNC: 61 U/L (ref 45–117)
ALT SERPL-CCNC: 13 U/L (ref 12–78)
ANION GAP BLD CALC-SCNC: 5 MMOL/L (ref 5–15)
AST SERPL W P-5'-P-CCNC: 13 U/L (ref 15–37)
BASOPHILS # BLD AUTO: 0 K/UL (ref 0–0.1)
BASOPHILS # BLD: 1 % (ref 0–1)
BILIRUB SERPL-MCNC: 0.5 MG/DL (ref 0.2–1)
BUN SERPL-MCNC: 19 MG/DL (ref 6–20)
BUN/CREAT SERPL: 15 (ref 12–20)
CALCIUM SERPL-MCNC: 10.6 MG/DL (ref 8.5–10.1)
CHLORIDE SERPL-SCNC: 106 MMOL/L (ref 97–108)
CO2 SERPL-SCNC: 33 MMOL/L (ref 21–32)
CREAT SERPL-MCNC: 1.27 MG/DL (ref 0.55–1.02)
EOSINOPHIL # BLD: 0.1 K/UL (ref 0–0.4)
EOSINOPHIL NFR BLD: 1 % (ref 0–7)
ERYTHROCYTE [DISTWIDTH] IN BLOOD BY AUTOMATED COUNT: 12.8 % (ref 11.5–14.5)
GLOBULIN SER CALC-MCNC: 3.5 G/DL (ref 2–4)
GLUCOSE SERPL-MCNC: 118 MG/DL (ref 65–100)
HCT VFR BLD AUTO: 36 % (ref 35–47)
HGB BLD-MCNC: 12 G/DL (ref 11.5–16)
LYMPHOCYTES # BLD AUTO: 29 % (ref 12–49)
LYMPHOCYTES # BLD: 1.3 K/UL (ref 0.8–3.5)
MCH RBC QN AUTO: 30.3 PG (ref 26–34)
MCHC RBC AUTO-ENTMCNC: 33.3 G/DL (ref 30–36.5)
MCV RBC AUTO: 90.9 FL (ref 80–99)
MONOCYTES # BLD: 0.5 K/UL (ref 0–1)
MONOCYTES NFR BLD AUTO: 11 % (ref 5–13)
NEUTS SEG # BLD: 2.6 K/UL (ref 1.8–8)
NEUTS SEG NFR BLD AUTO: 58 % (ref 32–75)
PLATELET # BLD AUTO: 215 K/UL (ref 150–400)
POTASSIUM SERPL-SCNC: 3.2 MMOL/L (ref 3.5–5.1)
PROT SERPL-MCNC: 7.2 G/DL (ref 6.4–8.2)
RBC # BLD AUTO: 3.96 M/UL (ref 3.8–5.2)
SODIUM SERPL-SCNC: 144 MMOL/L (ref 136–145)
WBC # BLD AUTO: 4.5 K/UL (ref 3.6–11)

## 2017-04-27 PROCEDURE — 71020 XR CHEST PA LAT: CPT

## 2017-04-27 PROCEDURE — 85025 COMPLETE CBC W/AUTO DIFF WBC: CPT | Performed by: EMERGENCY MEDICINE

## 2017-04-27 PROCEDURE — 90791 PSYCH DIAGNOSTIC EVALUATION: CPT

## 2017-04-27 PROCEDURE — 80053 COMPREHEN METABOLIC PANEL: CPT | Performed by: EMERGENCY MEDICINE

## 2017-04-27 PROCEDURE — 36415 COLL VENOUS BLD VENIPUNCTURE: CPT | Performed by: EMERGENCY MEDICINE

## 2017-04-27 PROCEDURE — 99284 EMERGENCY DEPT VISIT MOD MDM: CPT

## 2017-04-27 PROCEDURE — 72170 X-RAY EXAM OF PELVIS: CPT

## 2017-04-27 RX ORDER — ACETAMINOPHEN 500 MG
500 TABLET ORAL
Status: DISCONTINUED | OUTPATIENT
Start: 2017-04-27 | End: 2017-04-27 | Stop reason: HOSPADM

## 2017-04-27 RX ORDER — ASPIRIN 81 MG/1
81 TABLET ORAL DAILY
COMMUNITY

## 2017-04-27 NOTE — ED TRIAGE NOTES
Arrives with daughter whom she lives with for purposefully not taking medication or eating. \"I swear Im not crazy\"  Pt denies SI/HI. Daughter reports pt is having hallucinations.

## 2017-04-27 NOTE — DISCHARGE INSTRUCTIONS
We hope that we have addressed all of your medical concerns. The examination and treatment you received in the Emergency Department were for an emergent problem and were not intended as complete care. It is important that you follow up with your healthcare provider(s) for ongoing care. If your symptoms worsen or do not improve as expected, and you are unable to reach your usual health care provider(s), you should return to the Emergency Department. Today's healthcare is undergoing tremendous change, and patient satisfaction surveys are one of the many tools to assess the quality of medical care. You may receive a survey from the [facility] regarding your experience in the Emergency Department. I hope that your experience has been completely positive, particularly the medical care that I provided. As such, please participate in the survey; anything less than excellent does not meet my expectations or intentions. [facility] and [facility] participate in nationally recognized quality of care measures. If your blood pressure is greater than 120/80, as reported below, we urge that you seek medical care to address the potential of high blood pressure, commonly known as hypertension. Hypertension can be hereditary or can be caused by certain medical conditions, pain, stress, or \"white coat syndrome. \"       Please make an appointment with your health care provider(s) for follow up of your Emergency Department visit. VITALS:   Patient Vitals for the past 8 hrs:   Temp Pulse Resp BP SpO2   04/27/17 1900 - - - (!) 148/112 99 %   04/27/17 1852 - - - 162/82 -   04/27/17 1443 98 °F (36.7 °C) (!) 109 16 (!) 194/119 97 %          Thank you for allowing us to provide you with medical care today. We realize that you have many choices for your emergency care needs. Please choose us in the future for any continued health care needs.       Regards, Isabel Cope MD    [facility].   Office: [phone]            Recent Results (from the past 24 hour(s))   CBC WITH AUTOMATED DIFF    Collection Time: 04/27/17  6:44 PM   Result Value Ref Range    WBC 4.5 3.6 - 11.0 K/uL    RBC 3.96 3.80 - 5.20 M/uL    HGB 12.0 11.5 - 16.0 g/dL    HCT 36.0 35.0 - 47.0 %    MCV 90.9 80.0 - 99.0 FL    MCH 30.3 26.0 - 34.0 PG    MCHC 33.3 30.0 - 36.5 g/dL    RDW 12.8 11.5 - 14.5 %    PLATELET 206 275 - 494 K/uL    NEUTROPHILS 58 32 - 75 %    LYMPHOCYTES 29 12 - 49 %    MONOCYTES 11 5 - 13 %    EOSINOPHILS 1 0 - 7 %    BASOPHILS 1 0 - 1 %    ABS. NEUTROPHILS 2.6 1.8 - 8.0 K/UL    ABS. LYMPHOCYTES 1.3 0.8 - 3.5 K/UL    ABS. MONOCYTES 0.5 0.0 - 1.0 K/UL    ABS. EOSINOPHILS 0.1 0.0 - 0.4 K/UL    ABS. BASOPHILS 0.0 0.0 - 0.1 K/UL   METABOLIC PANEL, COMPREHENSIVE    Collection Time: 04/27/17  6:44 PM   Result Value Ref Range    Sodium 144 136 - 145 mmol/L    Potassium 3.2 (L) 3.5 - 5.1 mmol/L    Chloride 106 97 - 108 mmol/L    CO2 33 (H) 21 - 32 mmol/L    Anion gap 5 5 - 15 mmol/L    Glucose 118 (H) 65 - 100 mg/dL    BUN 19 6 - 20 MG/DL    Creatinine 1.27 (H) 0.55 - 1.02 MG/DL    BUN/Creatinine ratio 15 12 - 20      GFR est AA 48 (L) >60 ml/min/1.73m2    GFR est non-AA 40 (L) >60 ml/min/1.73m2    Calcium 10.6 (H) 8.5 - 10.1 MG/DL    Bilirubin, total 0.5 0.2 - 1.0 MG/DL    ALT (SGPT) 13 12 - 78 U/L    AST (SGOT) 13 (L) 15 - 37 U/L    Alk. phosphatase 61 45 - 117 U/L    Protein, total 7.2 6.4 - 8.2 g/dL    Albumin 3.7 3.5 - 5.0 g/dL    Globulin 3.5 2.0 - 4.0 g/dL    A-G Ratio 1.1 1.1 - 2.2         Xr Chest Pa Lat    Result Date: 4/27/2017  INDICATION:   ams COMPARISON: August 27, 2016 FINDINGS: Frontal and lateral views of the chest demonstrate normal heart size. The lungs are hyperinflated. There is right basilar atelectasis or scarring and right apical scarring. No pulmonary edema, pleural effusion or pneumothorax.  The osseous structures are unremarkable. IMPRESSION: Hyperexpanded lungs with right basilar atelectasis/scarring. Right apical scarring. Xr Pelv Ap Only    Result Date: 4/27/2017  INDICATION:   sacral wound COMPARISON: July 9, 2016 FINDINGS: AP view of the pelvis demonstrates partial visualization of a left hip arthroplasty. No evidence of acute fracture. Bowel gas and stool obscures portions of the sacrum. The pubic symphysis is intact. IMPRESSION: Left hip arthroplasty. No evidence of fracture. Bowel gas and stool obscures the sacrum.

## 2017-04-27 NOTE — BSMART NOTE
Comprehensive Assessment Form Part 1      Section I - Disposition    Axis I - Dementia   Axis II - None  Axis III -   Past Medical History:   Diagnosis Date    CAD (coronary artery disease)     Chronic    Chronic airway obstruction, not elsewhere classified     Frequent cough, wheezing secondary to lupus and COPD    Closed left hip fracture (HCC)     Decubitus ulcer of sacral region, stage 3 (HCC) 8/18/2016    Dyspnea on exertion     Echocardiogram 12/02/2010    EF 60-65%. Gr 1 DDfx. Mild LVH. Mild MR.    Essential hypertension, benign     GERD (gastroesophageal reflux disease)     Headache     History of myocardial perfusion scan 09/14/2012    No evidence of ischemia or infarction. Very mild apical thinning. EF 77%. No WMA. TID 1.46, suggests 3-vessel CAD. Intermediate to high risk pharm stress test due to TID.  Hypercholesterolemia     Lower extremity venous duplex 05/08/2013    Left leg:  No venous thrombosis or venous insufficiency.  Lupus erythematosus 1992    Lymphoma, non-Hodgkin's ([facility] [address] 5/2011    Low-grade being followed by Dr. Chani Knapp without therapy currently    Pneumothorax 1981    Blebs in right lung with recurrent Pneumothorax    S/P cardiac cath 09/24/2012    Hvy calcification of coronary arteries. LM minimal.  mLAD 35%. oD2 70% (unchanged). LCX mild. pRCA mild. Minimal ISR of prev stent. LVEDP 14-16. EF 65%. No WMA. Likely a falsely abnormal stress test.    Short-term memory loss     From fall in 2003    Sjogren's syndrome ([facility] [address] 1992    report of ROGELIO, SSA, RF positive    Traumatic subdural hematoma ([facility] [address] 2003    Head injury with subdural - s/p fall       Axis IV - Medication noncompliance  Stock[place] V - 40      The Medical Doctor to Psychiatrist conference was not completed. The Medical Doctor is in agreement with Psychiatrist disposition because of (reason) Patient and family do not want psychiatric admission. .  The plan is discharge and follow up with Neurologist and also given geriatric psychiatry referrals. The on-call Psychiatrist consulted was Dr. Arnulfo Mansfield. The admitting Psychiatrist will be Dr. Dana Ariza. The admitting Diagnosis is NA. The Payor source is Medicare. Section II - Integrated Summary  Summary:  Patient came in accompanied by her daughter and granddaughter. Patient stated \"Im not crazy. Im not sick\" when this clinician introduced self. Patient reported she is \"mad\" and \"tired\". Patient's family reported she won't take medications, is not eating or sleeping well and thinks people are talking about her and the tv can see or hear her. Patient's family denied mental health history. Patient denied SI or HI. Patient stated Ghada Martin will take me when he's ready. \"  The patienthas demonstrated mental capacity to provide informed consent. The information is given by the patient and relative(s). The Chief Complaint is AMS and hip pain. The Precipitant Factors are medic. Previous Hospitalizations: NA  The patient has not previously been in restraints. Current Psychiatrist and/or  is NA. Lethality Assessment:    The potential for suicide noted by the following:NA. The potential for homicide is not noted. The patient has not been a perpetrator of sexual or physical abuse. There are not pending charges. The patient is not felt to be at risk for self harm or harm to others. The attending nurse was advised that security has not been notified. Section III - Psychosocial  The patient's overall mood and attitude is \"mad\". Feelings of helplessness and hopelessness are not observed. Generalized anxiety is not observed. Panic is not observed. Phobias are not observed. Obsessive compulsive tendencies are not observed. Section IV - Mental Status Exam  The patient's appearance shows no evidence of impairment. The patient's behavior is restless. The patient is oriented to time, place, person and situation.   The patient's speech shows no evidence of impairment. The patient's mood is irritable. The range of affect is flat. The patient's thought content demonstrates paranoia. The thought process shows no evidence of impairment. The patient's perception demonstrated changes in the following:  auditory . The patient's memory shows no evidence of impairment. The patient's appetite is decreased and shows signs of weight loss. The patient's sleep has evidence of insomnia. The patient shows no insight. The patient's judgement is cognitively impaired. Section V - Substance Abuse  The patient is not using substances. Section VI - Living Arrangements  The patient is . The patient lives with a child/children. The patient does plan to return home upon discharge. The patient does not have legal issues pending. The patient's source of income comes from social security. Methodist and cultural practices have not been voiced at this time. The patient's greatest support comes from family and this person will be involved with the treatment. The patient has not been in an event described as horrible or outside the realm of ordinary life experience either currently or in the past.  The patient has not been a victim of sexual/physical abuse. Section VII - Other Areas of Clinical Concern  The highest grade achieved is NA with the overall quality of school experience being described as NA. The patient is currently unemployed and speaks [place] as a primary language. The patient has no communication impairments affecting communication. The patient's preference for learning can be described as: can read and write adequately. The patient's hearing is normal.  The patient's vision is impaired and  wears glasses or contacts.       Daisy Oliveros [facility]

## 2017-04-27 NOTE — ED PROVIDER NOTES
HPI Comments: 80 y.o. female with extensive past medical history, please see list, significant for HTN, hypercholesterolemia, Sjogren's syndrome, GERD, CAD, cardiac catheterization/stents, subdural hematoma, and L hip fracture who presents from home with chief complaint of mental health problem. Patient arrives with daughter and granddaughter complaining of a decline in mentation for \"a few months. \" Per daughter, patient has had increasing confusion recently. Daughter states patient gets \"television stories and reality mixed up. \" Daughter states patient has become suspicious of family members \"doing things\" to her food and has now refused to take medication or eat. Daughter states patient will have medication in her hand all day and state \"I'll take it when I'm ready. \" Patient last took medication yesterday, but refused to today. Daughter states patient has similar symptoms of confusion and had a dehydration work up. Daughter also reports patient has had shooting R hip and sacral pain since R hip surgery 10 months ago. Patient has undergone multiple physical therapy sessions with minimal relief. Daughter states patient had cortisol shots last month with no relief. Back and hip pain causes patient to have difficulty walking. Daughter states they called Dr. Miranda Clemente, Neurologist and was referred to ED for further evaluation. There are no other acute medical concerns at this time. Social hx: Lives with daughter  PCP: Tera Geiger MD    Note written by Alok Nugent. Sonal Rosa, as dictated by Walker Lebron MD 5:22 PM      The history is provided by the patient and a relative.         Past Medical History:   Diagnosis Date    CAD (coronary artery disease)     Chronic    Chronic airway obstruction, not elsewhere classified     Frequent cough, wheezing secondary to lupus and COPD    Closed left hip fracture (HCC)     Decubitus ulcer of sacral region, stage 3 (HCC) 8/18/2016    Dyspnea on exertion     Echocardiogram 12/02/2010    EF 60-65%. Gr 1 DDfx. Mild LVH. Mild MR.    Essential hypertension, benign     GERD (gastroesophageal reflux disease)     Headache     History of myocardial perfusion scan 09/14/2012    No evidence of ischemia or infarction. Very mild apical thinning. EF 77%. No WMA. TID 1.46, suggests 3-vessel CAD. Intermediate to high risk pharm stress test due to TID.  Hypercholesterolemia     Lower extremity venous duplex 05/08/2013    Left leg:  No venous thrombosis or venous insufficiency.  Lupus erythematosus 1992    Lymphoma, non-Hodgkin's ([facility] [address] 5/2011    Low-grade being followed by Dr. Bertrand Paiz without therapy currently    Pneumothorax 1981    Blebs in right lung with recurrent Pneumothorax    S/P cardiac cath 09/24/2012    Hvy calcification of coronary arteries. LM minimal.  mLAD 35%. oD2 70% (unchanged). LCX mild. pRCA mild. Minimal ISR of prev stent. LVEDP 14-16. EF 65%. No WMA.   Likely a falsely abnormal stress test.    Short-term memory loss     From fall in 2003    Sjogren's syndrome ([facility] [address] 1992    report of ROGELIO, SSA, RF positive    Traumatic subdural hematoma ([facility] [address] 2003    Head injury with subdural - s/p fall       Past Surgical History:   Procedure Laterality Date    CARDIAC SURG PROCEDURE UNLIST      cardiac stent    CHEST SURGERY PROCEDURE UNLISTED  1981    RUL removal    HX ADENOIDECTOMY  11/2013    eyelids lifted    HX CATARACT REMOVAL Bilateral     w/ lens implants    HX CORONARY STENT PLACEMENT  8/21/2007    HX HEART CATHETERIZATION  9/24/2012    HX HEART CATHETERIZATION  8/21/2007    Dr. Patsy Espinal at [facility] - stent placed    HX HEENT  2003    subdural hematoma removed s/p fall    HX HYSTERECTOMY  1974    HX LOBECTOMY Right     upper portion    HX PNEUMONECTOMY      partial    NEUROLOGICAL PROCEDURE UNLISTED      subdural hematoma         Family History:   Problem Relation Age of Onset    Cancer Father     Cancer Brother        Social History     Social History    Marital status:      Spouse name: N/A    Number of children: N/A    Years of education: N/A     Occupational History    Not on file. Social History Main Topics    Smoking status: Former Smoker     Quit date: 1/1/1981    Smokeless tobacco: Never Used    Alcohol use Yes      Comment: once a month drinks wine    Drug use: No    Sexual activity: Not Currently     Other Topics Concern    Not on file     Social History Narrative    ** Merged History Encounter **              ALLERGIES: Avapro [irbesartan]; Crestor [rosuvastatin]; Pcn [penicillins]; Plaquenil [hydroxychloroquine]; and Quinine    Review of Systems   Musculoskeletal: Positive for back pain (lower). Psychiatric/Behavioral: Positive for confusion and hallucinations. All other systems reviewed and are negative. Vitals:    04/27/17 1443   BP: (!) 194/119   Pulse: (!) 109   Resp: 16   Temp: 98 °F (36.7 °C)   SpO2: 97%   Weight: 54.3 kg (119 lb 12.8 oz)   Height: 5' 1\" (1.549 m)            Physical Exam   Constitutional: She appears well-developed and well-nourished. Seated in chair with walker   HENT:   Head: Normocephalic and atraumatic. Mouth/Throat: Oropharynx is clear and moist.   Eyes: EOM are normal. Pupils are equal, round, and reactive to light. Neck: Normal range of motion. Neck supple. Cardiovascular: Normal rate, regular rhythm, normal heart sounds and intact distal pulses. Exam reveals no gallop and no friction rub. No murmur heard. Pulmonary/Chest: Effort normal. No respiratory distress. She has no wheezes. She has no rales. Abdominal: Soft. There is no tenderness. There is no rebound. Musculoskeletal: Normal range of motion. She exhibits no tenderness. Neurological: She is alert. No cranial nerve deficit. Motor; symmetric   Skin: No erythema. Psychiatric: Her behavior is normal. She exhibits a depressed mood.    Cognition: Answer to questions seem tangential    Nursing note and vitals reviewed.        [facility]  ED Course       Procedures      PROGRESS NOTE:  7:45 PM  Dr. Mendoza Yeung, psychiatry recommended a low dose 25 mg of Risperdal, but family declines and would like to consult Dr. Janina Bello first.

## 2017-04-28 ENCOUNTER — TELEPHONE (OUTPATIENT)
Dept: NEUROLOGY | Age: 82
End: 2017-04-28

## 2017-04-28 NOTE — ED NOTES
D/c instructions and information discussed with pt and her family. Both verbalized understanding. Pt assisted out of ED by RN via w/c. No acute distress noted upon leave.

## 2017-04-28 NOTE — CALL BACK NOTE
[facility] Emergency Department Follow Up Call Record    Discharged to : Home/Family Home/Home Health/Skilled Facility/Rehab/Assisted Living/Other__home_____  1) Did you receive your discharge instructions? Yes        2) Do you understand them? Yes    Spoke with Ms. Saumya Pate (daughter) for Larena Phalen who reports her mother a little better today concerning pain. Still has confusion. Ms. Saumya Pate has call out to Dr. Praveen Gillis (Neurology) for follow up appointment for her mother. Ms. Saumya Pate stated she would discuss need for her mother with Neurologist.      3) Are you able to follow them? Yes          If NO, what can I clarify for you? 4) Do you understand your diagnosis? No Family report \"not certain as to cause of her behavior\". 5) Do you know which symptoms should prompt you to call the doctor? Yes   Currently daughter has call in to Neurology, Dr. Praveen Gillis. 6) Were you able to fill and  any medications that were prescribed? Refused     7) You were prescribed _n/a_________for ____________________. Common side effects of this medication are____________________. This is not a complete list so please review the forms given from the pharmacy for a complete list.      8) Are there any questions about your medications? No            Have you scheduled any recommended doctors appointments (specialty, PCP) N/A  If NO, what barriers are you encountering (transportation/lost contact info/cost/  didnt think necessary/no PCP  9) If discharged with Home Health, has the agency contacted you to schedule visit? Not applicable  10) Is there anyone available to help you at home (meals, errands, transportation    monitoring) (adult children, neighbors, private duty companions) Yes This patient lives with family. 11) Are you on a special diet? No         If YES, do you understand the requirements for this diet? Education provided?   12) If presented with cough, bronchitis, COPD, asthma, is it ok to ask that the   respiratory disease management educator call you? Not applicable      13)  A) If presented with fall, were you issued an assistive device in the ED    Are you using? Yes   Patient uses own walker     B) If given RX for device, have you obtained? Not applicable       If NO, barriers? C) Therapist recommended:N/A   Are you able to implement the suggestions? Not applicable        If NO, barriers to implementation? D) Are you having any difficulties with mobility inside your home?     (steps, bed, tub)No This patient utilizes walker . ,    If YES, ask if the [facility] PT can contact patient and good time and number?  14)  At the end of your discharge instructions, there is information about accessing [facility], have you had a chance to review those? No         Do you have any questions about signing up for this service? NO   We encourage our patients to be active participants in their healthcare and this site is one of the ways to do that. It will allow you to access parts of your medical record, email your doctors office, schedule appointments, and request medications refills . 15) Are there any other questions that I can answer for you regarding    your Emergency department visit?  NO             Estimated Call Time:_____12:23 PM  ______________ Date/Time:_______________

## 2017-04-28 NOTE — TELEPHONE ENCOUNTER
Pt's daughter would like Dr. Wali Archuleta to know that the pt was in the hospital. Doctor at the hospital recommended Risperdal 25mg for paranoia. She would like a call from Dr. Wali Archuleta.

## 2017-05-05 ENCOUNTER — TELEPHONE (OUTPATIENT)
Dept: NEUROLOGY | Age: 82
End: 2017-05-05

## 2017-05-15 RX ORDER — POTASSIUM CHLORIDE 750 MG/1
TABLET, EXTENDED RELEASE ORAL
Qty: 30 TAB | Refills: 6 | Status: SHIPPED | OUTPATIENT
Start: 2017-05-15 | End: 2017-12-19 | Stop reason: SDUPTHER

## 2017-06-26 ENCOUNTER — HOSPITAL ENCOUNTER (OUTPATIENT)
Dept: LAB | Age: 82
Discharge: HOME | End: 2017-06-26
Payer: MEDICARE

## 2017-06-26 ENCOUNTER — OFFICE VISIT (OUTPATIENT)
Dept: INTERNAL MEDICINE CLINIC | Age: 82
End: 2017-06-26

## 2017-06-26 VITALS
DIASTOLIC BLOOD PRESSURE: 76 MMHG | SYSTOLIC BLOOD PRESSURE: 132 MMHG | HEIGHT: 61 IN | BODY MASS INDEX: 23.6 KG/M2 | OXYGEN SATURATION: 98 % | RESPIRATION RATE: 14 BRPM | WEIGHT: 125 LBS | TEMPERATURE: 99.3 F | HEART RATE: 94 BPM

## 2017-06-26 DIAGNOSIS — Z00.00 MEDICARE ANNUAL WELLNESS VISIT, SUBSEQUENT: Primary | ICD-10-CM

## 2017-06-26 DIAGNOSIS — I10 ESSENTIAL HYPERTENSION, BENIGN: ICD-10-CM

## 2017-06-26 DIAGNOSIS — R05.8 NOCTURNAL COUGH: ICD-10-CM

## 2017-06-26 DIAGNOSIS — M79.89 LEG SWELLING: ICD-10-CM

## 2017-06-26 PROCEDURE — 80053 COMPREHEN METABOLIC PANEL: CPT

## 2017-06-26 PROCEDURE — 36415 COLL VENOUS BLD VENIPUNCTURE: CPT

## 2017-06-26 RX ORDER — CITALOPRAM 10 MG/1
10 TABLET ORAL DAILY
COMMUNITY
Start: 2017-06-01 | End: 2017-06-30 | Stop reason: SDUPTHER

## 2017-06-26 NOTE — PATIENT INSTRUCTIONS
Today's Date -  6/26/17  Medicare Part B Preventive Services Limitations Recommendation/Date completed if known Scheduled/ Next Due   Bone Mass Measurement  (age 72 & older, biennial) Requires diagnosis related to osteoporosis or estrogen deficiency. Biennial benefit unless patient has history of long-term glucocorticoid tx or baseline is needed because initial test was by other method Completed:      Recommended every 2 years Declined   Cardiovascular Screening Blood Tests (every 5 years)  Total cholesterol, HDL, Triglycerides Order as a panel if possible Completed:  2015    Recommended annually Due per PCP recommendation   Colorectal Cancer Screening  -Fecal occult blood test (annual)  -Flexible sigmoidoscopy (5y)  -Screening colonoscopy (10y)  -Barium Enema  Completed:        Recommended every 10 years N/A   Counseling to Prevent Tobacco Use (up to 8 sessions per year)  - Counseling greater than 3 and up to 10 minutes  - Counseling greater than 10 minutes Patients must be asymptomatic of tobacco-related conditions to receive as preventive service  N/A   Prevnar 13 vaccine       Due Now please go to the pharmacy to receive     TDAP Vaccine     Due every 10 years please check your records to see when you last received this vaccine   Shingles Vaccine         Declined   Influenza Vaecine        Due Every Fall   Pneumonia Vaccine       Received - you won't need this vaccine again    Diabetes Screening Tests (at least every 3 years, Medicare covers annually or at 6-month intervals for prediabetic patients)    Fasting blood sugar (FBS) or glucose tolerance test (GTT)       Patient must be diagnosed with one of the following:  -Hypertension, Dyslipidemia, obesity, previous impaired FBS or GTT  Or any two of the following: overweight, FH of diabetes, age ? 72, history of gestational diabetes, birth of baby weighing more than 9 pounds Completed:    4/27/17    Recommended annually DUE: 4/27/18   Diabetes Self-Management Training (DSMT) (no USPSTF recommendation) Requires referral by treating physician for patient with diabetes or renal disease. 10 hours of initial DSMT session of no less than 30 minutes each in a continuous 12-month period. 2 hours of follow-up DSMT in subsequent years. n/a   Glaucoma Screening (no USPSTF recommendation) Diabetes mellitus, family history, , age 48 or over,  American, age 72 or over Completed:  6/19/17      Recommended annually DUE: 6/2018   Human Immunodeficiency Virus (HIV) Screening (annually for increased risk patients)  HIV-1 and HIV-2 by EIA, SAJNAY, rapid antibody test, or oral mucosa transudate Patient must be at increased risk for HIV infection per USPSTF guidelines or pregnant. Tests covered annually for patients at increased risk. Pregnant patients may receive up to 3 test during pregnancy. N/A   Medical Nutrition Therapy (MNT) (fordiabetes or renal disease not recommended schedule) Requires referral by treating physician for patient with diabetes or renal disease. Can be provided in same year as diabetes self-management training (DSMT), and CMS recommends medical nutrition therapy take place after DSMT. Up to 3 hours for initial year and 2 hours in subsequent years. N/A   Hepatitis B Vaccinations (if medium/high risk) Medium/high risk factors:  End-stage renal disease,  Hemophiliacs who received Factor VIII or IX concentrates, Clients of institutions for the mentally retarded, Persons who live in the same house as a HepB virus carrier, Homosexual men, Illicit injectable drug abusers. N/A   Screening Mammography (biennial age 54-69)?  Annually (age 36 or over) Completed:       Recommended annually N/A   Screening Pap Tests and Pelvic Examination (up to age 79 and after 79 if unknown history or abnormal study last 10 years) Every 24 months except high risk Completed:        Recommended every 2 years N/A     Ultrasound Screening for Abdominal Aortic Aneurysm (AAA) (once)   Patient must be referred through [facility] and not have had a screening for abdominal aortic aneurysm before under Medicare. Limited to patients who meet one of the following criteria:  - Men who are 73-68 years old and have smoked more than 100 cigarettes in their lifetime.  -Anyone with a FH of AAA  -Anyone recommended for screening by USPSTF   Not covered by   Medicare as preventive. N/A   Thanks for coming in today. It was nice to spend some time with you. If you have any questions about your visit today, please call [phone] and ask to speak with Quintero Rule.

## 2017-06-26 NOTE — PROGRESS NOTES
[facility] Medicare Wellness Visit      Sarahi Chavez is a 80 y.o. female and presents for Annual Medicare Wellness Visit. Assessment of cognitive impairment: Alert and oriented x 3. Depression Screen:   PHQ over the last two weeks 6/26/2017   Little interest or pleasure in doing things Several days   Feeling down, depressed or hopeless Not at all   Total Score PHQ 2 1       Fall Risk Assessment:    Fall Risk Assessment, last 12 mths 6/26/2017   Able to walk? Yes   Fall in past 12 months? Yes   Fall with injury? Yes   Number of falls in past 12 months 2   Fall Risk Score 3       Abuse Screen:   Abuse Screening Questionnaire 6/26/2017   Do you ever feel afraid of your partner? N   Are you in a relationship with someone who physically or mentally threatens you? N   Is it safe for you to go home?  Y       Activities of Daily Living:  Partial assistance   Requires assistance with: bathing and hygiene and driving, cleaning , meds given by daughter  Patient handle his/her own medications  Daughter assists w pillbox Use of pill box  yes    ADL Assessment 6/26/2017   Feeding yourself No Help Needed   Getting from bed to chair No Help Needed   Getting dressed Help Needed   Bathing or showering Help Needed   Walk across the room (includes cane/walker) No Help Needed   Using the telphone No Help Needed   Taking your medications Help Needed   Preparing meals No Help Needed   Managing money (expenses/bills) Help Needed   Moderately strenuous housework (laundry) Help Needed   Shopping for personal items (toiletries/medicines) Help Needed   Shopping for groceries Help Needed   Driving Help Needed   Climbing a flight of stairs Help Needed   Getting to places beyond walking distances Help Needed       Health Maintenance:  Daily Aspirin: yes  Bone Density: patient declines to continue , would not want medications if she did have osteoporosis  Glaucoma Screening: yes, saw the eye doctor last week   Immunizations:    Tetanus: up to date. Influenza: up to date. Shingles: patient declines vaccine. PPSV-23: up to date. Prevnar-13: not up to date - ordered today patient will go to the pharmacy to receive. .    Cancer screening:    Cervical: N/A Age 80. Breast: N/A Age 80. Colon: done in the past no polyps, no longer continuing. Alcohol Risk Screen:   On any occasion during the past 3 months, have you had more than 3 drinks(female) or 4 drinks (male) containing alcohol in one? No  Do you average more than 7 drinks (female) or 14 drinks (male) per week? No  Type and amount:occasional wine     Hearing Loss: Moderate no hearing aids    Vision Loss:   Wears glasses, contact lenses, or have any other visual impairment  Yes glasses     Adult Nutrition Screen:  No risk factors noted. Advance Care Planning:   End of Life Planning: has an advanced directive - a copy has been provided  [facility] ACP-Facilitator appointment no      Medications/Allergies: Reviewed with patient  Prior to Admission medications    Medication Sig Start Date End Date Taking? Authorizing Provider   citalopram (CELEXA) 10 mg tablet Take 10 mg by mouth daily. 6/1/17  Yes Historical Provider   KLOR-CON M10 10 mEq tablet TAKE ONE TABLET BY MOUTH DAILY 5/15/17  Yes Jarod Santoro DO   aspirin delayed-release 81 mg tablet Take 81 mg by mouth daily. Yes Mekhi Vasquez MD   furosemide (LASIX) 20 mg tablet Take 1 Tab by mouth daily. 3/27/17  Yes Nate Fernandes MD   metoprolol tartrate (LOPRESSOR) 50 mg tablet Take 1 Tab by mouth two (2) times a day. Patient taking differently: Take 50 mg by mouth daily. 11/21/16  Yes Candise Essex, NP   cholecalciferol (VITAMIN D3) 1,000 unit cap Take  by mouth daily. Yes Historical Provider   multivitamin (ONE A DAY) tablet Take 1 Tab by mouth daily. Yes Historical Provider   polyethylene glycol (MIRALAX) 17 gram packet Take 17 g by mouth daily.    Yes Historical Provider   ranitidine (ZANTAC) 150 mg tablet Take 1 Tab by mouth daily. 8/10/16  Yes Jonathan Wilks, NP   acetaminophen-codeine (TYLENOL-CODEINE #3) 300-30 mg per tablet Take 1 Tab by mouth every four (4) hours as needed for Pain. Max Daily Amount: 6 Tabs. 11/21/16   Damian Holstein, MD       Allergies   Allergen Reactions    Avapro [Irbesartan] Other (comments)     Hypotension and dizziness    Crestor [Rosuvastatin] Itching and Myalgia    Pcn [Penicillins] Rash     Other reaction(s): hives    Plaquenil [Hydroxychloroquine] Rash    Quinine Other (comments)     Other reaction(s): other/intolerance  \"ringing in my ears\"  Ringing in ear       Objective:  Visit Vitals    /76 (BP 1 Location: Left arm, BP Patient Position: Sitting)    Pulse 94    Temp 99.3 °F (37.4 °C) (Oral)    Resp 14    Ht 5' 1\" (1.549 m)    Wt 125 lb (56.7 kg)    SpO2 98%    BMI 23.62 kg/m2    Body mass index is 23.62 kg/(m^2). Problem List: Reviewed with patient and discussed risk factors.     Patient Active Problem List   Diagnosis Code    Dyspnea on exertion R06.09    Lupus erythematosus L93.0    Sjogren's syndrome ([address] M35.00    Chronic airway obstruction ([address] J44.9    Hypertensive heart disease I11.9    Dyslipidemia E78.5    Coronary atherosclerosis of native coronary artery I25.10    Lymphoma, non-Hodgkin's (HCC) C85.90    Hypercholesterolemia E78.00    Essential hypertension, benign I10    Left displaced femoral neck fracture (HCC) S72.002A    Diffuse large B-cell lymphoma of lymph nodes of multiple regions (HCC) C83.38    Pulmonary fibrosis (HCC) J84.10    Status post angioplasty with stent Z95.9    Essential hypertension with goal blood pressure less than 140/90 I10    Mixed hyperlipidemia E78.2    Lupus (systemic lupus erythematosus) (HCC) M32.9    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 Paul Perez at [facility] - stent placed    HX HEENT  2003    subdural hematoma removed s/p fall    HX HYSTERECTOMY  1974    HX LOBECTOMY Right     upper portion    HX PNEUMONECTOMY      partial    NEUROLOGICAL PROCEDURE UNLISTED      subdural hematoma        SH: Reviewed with patient  Social History   Substance Use Topics    Smoking status: Former Smoker     Quit date: 1/1/1981    Smokeless tobacco: Never Used    Alcohol use Yes      Comment: once a month drinks wine       FH: Reviewed with patient  Family History   Problem Relation Age of Onset    Cancer Father     Cancer Brother        Current medical providers:    Patient Care Team:  Leona Green MD as PCP - General (Internal Medicine)  Mariana Magallanes MD (Hematology and Oncology)  Marcus France DO (Rheumatology)  Erasto Marrero MD (Surgical Oncology)  Elizabeth Childs MD (Orthopedic Surgery)    Plan:    Marta White was seen today for annual wellness visit. Diagnoses and all orders for this visit:    Medicare annual wellness visit, subsequent  -     pneumococcal 13 candace conj dip (PREVNAR-13) 0.5 mL syrg injection; 0.5 mL by IntraMUSCular route once for 1 dose.           Orders Placed This Encounter    citalopram (CELEXA) 10 mg tablet       Health Maintenance   Topic Date Due    DTaP/Tdap/Td series (1 - Tdap) 05/08/1952    ZOSTER VACCINE AGE 60>  05/08/1991    GLAUCOMA SCREENING Q2Y  05/08/1996    MEDICARE YEARLY EXAM  05/08/1996    INFLUENZA AGE 9 TO ADULT  08/01/2017    OSTEOPOROSIS SCREENING (DEXA)  Completed    Pneumococcal 65+ High/Highest Risk  Addressed          Urinary/ Fecal Incontinence: patient wears a pad at night for urinary incontinence. No fecal incontinence. Regular physical exercise:  Walks daily around the house and uses 1 lb wts     Patient verbalized understanding of information presented. AVS and Medicare Part B Preventive Services Table printed and given to pt and reviewed. See table for findings under Recommendation and Scheduled. All of the patient's questions were answered. Patient complains of bilat lower ext edema L>R with numbness/tingling in feet intermittently at night. Patient had left hip replaced in 7/16 . Patient notes pain nightly in left knee and under right breast into epigastric area (she attributes this to 2 falls  She had in rehab with negative xrays),     Patient also notes coughing  at night time. No choking sensation with meals. Review of Systems   Constitutional: Negative for fever, chills and malaise/fatigue. HENT: Negative for congestion. Eyes: Negative for blurred vision and discharge. Respiratory: Negative for cough, shortness of breath and wheezing. Cardiovascular: Negative for chest pain, palpitations and leg swelling. Gastrointestinal: Negative for nausea and vomiting. Genitourinary: Negative for urgency and frequency. Musculoskeletal: + joint & muscle pain. Skin: Negative for rash and itching. Physical Exam   Constitutional: She is oriented. She appears well-developed and well-nourished. No distress. HENT:   Mouth/Throat: Oropharynx is clear and moist. No oropharyngeal exudate. Eyes: Conjunctivae and extraocular motions are normal. Pupils are equal, round, and reactive to light. Neck: Normal range of motion. Neck supple. Cardiovascular: Regular rhythm and normal heart sounds. Pulmonary/Chest: Breath sounds normal. No respiratory distress. She has no wheezes. Abdominal: Soft. Bowel sounds are normal. She exhibits no distension. Musculoskeletal: Arthritic changes in both hands.  1 + pitting edema in both lower legs & small dent around the sock cut. Neurological: She is oriented. She has normal reflexes. No cranial nerve deficit. Assessment & Plan    Diagnoses and all orders for this visit:    Essential hypertension, benign  -     METABOLIC PANEL, COMPREHENSIVE    Leg swelling  Told her to wear Clay stocking above the knee. No need for knee imaging as ROM is nl and no swelling. Nocturnal cough  Told her could be due to heart burn. Recommended honey thickened consistency & chewing solid food . Her daughter does not want Barium swallow study at this time. If cough doesn`t improve then we will consider imaging.

## 2017-06-26 NOTE — PROGRESS NOTES
NN Medicare Wellness Visit      Miguel Nice is a 80 y.o. female and presents for Annual Medicare Wellness Visit. Assessment of cognitive impairment: Alert and oriented x 3. Depression Screen:   PHQ over the last two weeks 6/26/2017   Little interest or pleasure in doing things Several days   Feeling down, depressed or hopeless Not at all   Total Score PHQ 2 1       Fall Risk Assessment:    Fall Risk Assessment, last 12 mths 6/26/2017   Able to walk? Yes   Fall in past 12 months? Yes   Fall with injury? Yes   Number of falls in past 12 months 2   Fall Risk Score 3       Abuse Screen:   Abuse Screening Questionnaire 6/26/2017   Do you ever feel afraid of your partner? N   Are you in a relationship with someone who physically or mentally threatens you? N   Is it safe for you to go home?  Y       Activities of Daily Living:  Partial assistance   Requires assistance with: bathing and hygiene and driving, cleaning , meds given by daughter  Patient handle his/her own medications  Daughter assists w pillbox Use of pill box  yes    ADL Assessment 6/26/2017   Feeding yourself No Help Needed   Getting from bed to chair No Help Needed   Getting dressed Help Needed   Bathing or showering Help Needed   Walk across the room (includes cane/walker) No Help Needed   Using the telphone No Help Needed   Taking your medications Help Needed   Preparing meals No Help Needed   Managing money (expenses/bills) Help Needed   Moderately strenuous housework (laundry) Help Needed   Shopping for personal items (toiletries/medicines) Help Needed   Shopping for groceries Help Needed   Driving Help Needed   Climbing a flight of stairs Help Needed   Getting to places beyond walking distances Help Needed       Health Maintenance:  Daily Aspirin: yes  Bone Density: patient declines to continue , would not want medications if she did have osteoporosis  Glaucoma Screening: yes, saw the eye doctor last week   Immunizations:    Tetanus: up to date.  Influenza: up to date. Shingles: patient declines vaccine. PPSV-23: up to date. Prevnar-13: not up to date - ordered today patient will go to the pharmacy to receive. .    Cancer screening:    Cervical: N/A Age 80. Breast: N/A Age 80. Colon: done in the past no polyps, no longer continuing. Alcohol Risk Screen:   On any occasion during the past 3 months, have you had more than 3 drinks(female) or 4 drinks (male) containing alcohol in one? No  Do you average more than 7 drinks (female) or 14 drinks (male) per week? No  Type and amount:occasional wine     Hearing Loss: Moderate no hearing aids    Vision Loss:   Wears glasses, contact lenses, or have any other visual impairment  Yes glasses     Adult Nutrition Screen:  No risk factors noted. Advance Care Planning:   End of Life Planning: has an advanced directive - a copy has been provided  [facility] [place] [facility] ACP-Facilitator appointment no      Medications/Allergies: Reviewed with patient  Prior to Admission medications    Medication Sig Start Date End Date Taking? Authorizing Provider   citalopram (CELEXA) 10 mg tablet Take 10 mg by mouth daily. 6/1/17  Yes Historical Provider   KLOR-CON M10 10 mEq tablet TAKE ONE TABLET BY MOUTH DAILY 5/15/17  Yes Caroline Garcia DO   aspirin delayed-release 81 mg tablet Take 81 mg by mouth daily. Yes Mekhi Vasquez MD   furosemide (LASIX) 20 mg tablet Take 1 Tab by mouth daily. 3/27/17  Yes Edin Eastman MD   metoprolol tartrate (LOPRESSOR) 50 mg tablet Take 1 Tab by mouth two (2) times a day. Patient taking differently: Take 50 mg by mouth daily. 11/21/16  Yes Noah Hayes NP   cholecalciferol (VITAMIN D3) 1,000 unit cap Take  by mouth daily. Yes Historical Provider   multivitamin (ONE A DAY) tablet Take 1 Tab by mouth daily. Yes Historical Provider   polyethylene glycol (MIRALAX) 17 gram packet Take 17 g by mouth daily.    Yes Historical Provider   ranitidine (ZANTAC) 150 mg tablet Take 1 Tab by mouth daily. 8/10/16  Yes Sylvester Ward NP   acetaminophen-codeine (TYLENOL-CODEINE #3) 300-30 mg per tablet Take 1 Tab by mouth every four (4) hours as needed for Pain. Max Daily Amount: 6 Tabs. 11/21/16   Laura Clayton MD       Allergies   Allergen Reactions    Avapro [Irbesartan] Other (comments)     Hypotension and dizziness    Crestor [Rosuvastatin] Itching and Myalgia    Pcn [Penicillins] Rash     Other reaction(s): hives    Plaquenil [Hydroxychloroquine] Rash    Quinine Other (comments)     Other reaction(s): other/intolerance  \"ringing in my ears\"  Ringing in ear       Objective:  Visit Vitals    /76 (BP 1 Location: Left arm, BP Patient Position: Sitting)    Pulse 94    Temp 99.3 °F (37.4 °C) (Oral)    Resp 14    Ht 5' 1\" (1.549 m)    Wt 125 lb (56.7 kg)    SpO2 98%    BMI 23.62 kg/m2    Body mass index is 23.62 kg/(m^2). Problem List: Reviewed with patient and discussed risk factors.     Patient Active Problem List   Diagnosis Code    Dyspnea on exertion R06.09    Lupus erythematosus L93.0    Sjogren's syndrome ([address] M35.00    Chronic airway obstruction ([address] J44.9    Hypertensive heart disease I11.9    Dyslipidemia E78.5    Coronary atherosclerosis of native coronary artery I25.10    Lymphoma, non-Hodgkin's (HCC) C85.90    Hypercholesterolemia E78.00    Essential hypertension, benign I10    Left displaced femoral neck fracture (HCC) S72.002A    Diffuse large B-cell lymphoma of lymph nodes of multiple regions (HCC) C83.38    Pulmonary fibrosis (HCC) J84.10    Status post angioplasty with stent Z95.9    Essential hypertension with goal blood pressure less than 140/90 I10    Mixed hyperlipidemia E78.2    Lupus (systemic lupus erythematosus) (HCC) M32.9    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useb[facility] - stent placed    HX HEENT  2003    subdural hematoma removed s/p fall    HX HYSTERECTOMY  1974    HX LOBECTOMY Right     upper portion    HX PNEUMONECTOMY      partial    NEUROLOGICAL PROCEDURE UNLISTED      subdural hematoma        SH: Reviewed with patient  Social History   Substance Use Topics    Smoking status: Former Smoker     Quit date: 1/1/1981    Smokeless tobacco: Never Used    Alcohol use Yes      Comment: once a month drinks wine       FH: Reviewed with patient  Family History   Problem Relation Age of Onset    Cancer Father     Cancer Brother        Current medical providers:    Patient Care Team:  Danielle Hope MD as PCP - General (Internal Medicine)  Alethea Damico MD (Hematology and Oncology)  Clara Cohn DO (Rheumatology)  Darrin Villalobos MD (Surgical Oncology)  Jesse Cotton MD (Orthopedic Surgery)    Plan:    Ismael Dee was seen today for annual wellness visit. Diagnoses and all orders for this visit:    Medicare annual wellness visit, subsequent  -     pneumococcal 13 candace conj dip (PREVNAR-13) 0.5 mL syrg injection; 0.5 mL by IntraMUSCular route once for 1 dose.           Orders Placed This Encounter    citalopram (CELEXA) 10 mg tablet       Health Maintenance   Topic Date Due    DTaP/Tdap/Td series (1 - Tdap) 05/[birth year]/1952    ZOSTER VACCINE AGE 60>  05/08/1991    GLAUCOMA SCREENING Q2Y  05/08/1996    MEDICARE YEARLY EXAM  05/08/1996    INFLUENZA AGE 9 TO ADULT  08/01/2017    OSTEOPOROSIS SCREENING (DEXA)  Completed    Pneumococcal 65+ High/Highest Risk  Addressed          Urinary/ Fecal Incontinence: patient wears a pad at night for urinary incontinence. No fecal incontinence. Regular physical exercise:  Walks daily around the house and uses 1 lb wts     Patient verbalized understanding of information presented. AVS and Medicare Part B Preventive Services Table printed and given to pt and reviewed. See table for findings under Recommendation and Scheduled. All of the patient's questions were answered. Patient complains of bilat lower ext edema L>R with numbness/tingling in feet intermittently at night. Patient had left hip replaced in 7/16 . Patient notes pain nightly in left knee and under right breast into epigastric area (she attributes this to 2 falls  She had in rehab with negative xrays),     Patient also notes coughing  at night time. No choking sensation with meals. Review of Systems   Constitutional: Negative for fever, chills and malaise/fatigue. HENT: Negative for congestion. Eyes: Negative for blurred vision and discharge. Respiratory: Negative for cough, shortness of breath and wheezing. Cardiovascular: Negative for chest pain, palpitations and leg swelling. Gastrointestinal: Negative for nausea and vomiting. Genitourinary: Negative for urgency and frequency. Musculoskeletal: + joint & muscle pain. Skin: Negative for rash and itching. Physical Exam   Constitutional: She is oriented. She appears well-developed and well-nourished. No distress. HENT:   Mouth/Throat: Oropharynx is clear and moist. No oropharyngeal exudate. Eyes: Conjunctivae and extraocular motions are normal. Pupils are equal, round, and reactive to light. Neck: Normal range of motion. Neck supple. Cardiovascular: Regular rhythm and normal heart sounds. Pulmonary/Chest: Breath sounds normal. No respiratory distress. She has no wheezes. Abdominal: Soft. Bowel sounds are normal. She exhibits no distension. Musculoskeletal: Arthritic changes in both hands.  1 + pitting edema in both lower legs & small dent around the sock cut. Neurological: She is oriented. She has normal reflexes. No cranial nerve deficit. Assessment & Plan    Diagnoses and all orders for this visit:    Essential hypertension, benign  -     METABOLIC PANEL, COMPREHENSIVE    Leg swelling  Told her to wear Clay stocking above the knee. No need for knee imaging as ROM is nl and no swelling. Nocturnal cough  Told her could be due to heart burn. Recommended honey thickened consistency & chewing solid food . Her daughter does not want Barium swallow study at this time. If cough doesn`t improve then we will consider imaging.

## 2017-06-26 NOTE — MR AVS SNAPSHOT
Visit Information Date & Time Provider Department Dept. Phone Encounter #  
 6/26/2017 11:00 AM Ramiro Waite MD [facility] [phone] [id number] Your Appointments 6/29/2017  1:00 PM  
Follow Up with Varun Watt MD  
[facility] [address]) Appt Note: 3 month f/u memory loss KU 3/16  
 [address] [facility] [address]  
[phone]  
  
   
 [address] [place] [id number]  
  
    
 6/30/2017 10:40 AM  
Follow Up with Maddi Mccartney DO [facility] ([address]) Appt Note: Pt complains of chest pain; Rescheduling Appointment From 04/19/2017 [place] [address] [address] [address] [phone] [facility] [address] [id number]  
  
    
 7/30/2018 12:30 PM  
Medicare Physical with Ramiro Waite MD  
[facility] [address] [place] [address]) Appt Note: [facility] Wellness   
 [facility] [address] [facility] [address]  
[address] [facility] [address] [place] [address] Upcoming Health Maintenance Date Due  
 GLAUCOMA SCREENING Q2Y 5/8/1996 INFLUENZA AGE 9 TO ADULT 8/1/2017 MEDICARE YEARLY EXAM 6/27/2018 DTaP/Tdap/Td series (2 - Td) 6/26/2027 Allergies as of 6/26/2017  Review Complete On: 6/26/2017 By: Ramiro Waite MD  
  
 Severity Noted Reaction Type Reactions Avapro [Irbesartan]  01/21/2011   Side Effect Other (comments) Hypotension and dizziness Crestor [Rosuvastatin]  01/21/2011   Side Effect Itching, Myalgia Pcn [Penicillins]  01/21/2011   Side Effect Rash Other reaction(s): hives Plaquenil [Hydroxychloroquine]  11/15/2016    Rash Quinine  01/21/2011   Side Effect Other (comments) Other reaction(s): other/intolerance \"ringing in my ears\" Ringing in ear Current Immunizations  Reviewed on 7/11/2016 Name Date Influenza High Dose Vaccine PF 11/1/2016 Influenza Vaccine 10/1/2010 Novel Influenza-H1N1-09, All Formulations 10/1/2010 Pneumococcal Polysaccharide (PPSV-23) 5/1/2010 Not reviewed this visit You Were Diagnosed With   
  
 Codes Comments Medicare annual wellness visit, subsequent    -  Primary ICD-10-CM: Z00.00 ICD-9-CM: V70.0 Essential hypertension, benign     ICD-10-CM: I10 
ICD-9-CM: 401.1 Elevated serum creatinine     ICD-10-CM: R79.89 ICD-9-CM: 790.99 Vitals BP Pulse Temp Resp Height(growth percentile) Weight(growth percentile) 132/76 (BP 1 Location: Left arm, BP Patient Position: Sitting) 94 99.3 °F (37.4 °C) (Oral) 14 5' 1\" (1.549 m) 125 lb (56.7 kg) SpO2 BMI OB Status Smoking Status 98% 23.62 kg/m2 Hysterectomy Former Smoker Vitals History BMI and BSA Data Body Mass Index Body Surface Area  
 23.62 kg/m 2 1.56 m 2 Preferred Pharmacy Pharmacy Name Phone [address] [place]entes, [address] [facility]. [phone] Your Updated Medication List  
  
   
This list is accurate as of: 6/26/17 12:19 PM.  Always use your most recent med list.  
  
  
  
  
 aspirin delayed-release 81 mg tablet Take 81 mg by mouth daily. citalopram 10 mg tablet Commonly known as:  Milad Lasha Take 10 mg by mouth daily. furosemide 20 mg tablet Commonly known as:  LASIX Take 1 Tab by mouth daily. KLOR-CON M10 10 mEq tablet Generic drug:  potassium chloride TAKE ONE TABLET BY MOUTH DAILY  
  
 metoprolol tartrate 50 mg tablet Commonly known as:  LOPRESSOR Take 1 Tab by mouth two (2) times a day. MIRALAX 17 gram packet Generic drug:  polyethylene glycol Take 17 g by mouth daily. multivitamin tablet Commonly known as:  ONE A DAY Take 1 Tab by mouth daily. pneumococcal 13 candace conj dip 0.5 mL Syrg injection Commonly known as:  PREVNAR-13  
0.5 mL by IntraMUSCular route once for 1 dose. raNITIdine 150 mg tablet Commonly known as:  ZANTAC Take 1 Tab by mouth daily. VITAMIN D3 1,000 unit Cap Generic drug:  cholecalciferol Take  by mouth daily. Prescriptions Printed Refills  
 pneumococcal 13 candace conj dip (PREVNAR-13) 0.5 mL syrg injection 0 Si.5 mL by IntraMUSCular route once for 1 dose. Class: Print Route: IntraMUSCular We Performed the Following METABOLIC PANEL, COMPREHENSIVE [24308 CPT(R)] Patient Instructions Today's Date -  17 Medicare Part B Preventive Services Limitations Recommendation/Date completed if known Scheduled/ Next Due Bone Mass Measurement 
(age 72 & older, biennial) Requires diagnosis related to osteoporosis or estrogen deficiency. Biennial benefit unless patient has history of long-term glucocorticoid tx or baseline is needed because initial test was by other method Completed: 
 
 
Recommended every 2 years Declined Cardiovascular Screening Blood Tests (every 5 years) Total cholesterol, HDL, Triglycerides Order as a panel if possible Completed: 
 Recommended annually Due per PCP recommendation Colorectal Cancer Screening 
-Fecal occult blood test (annual) -Flexible sigmoidoscopy (5y) 
-Screening colonoscopy (10y) -Barium Enema  Completed: 
 
 
 
Recommended every 10 years N/A Counseling to Prevent Tobacco Use (up to 8 sessions per year) - Counseling greater than 3 and up to 10 minutes - Counseling greater than 10 minutes Patients must be asymptomatic of tobacco-related conditions to receive as preventive service  N/A Prevnar 13 vaccine Due Now please go to the pharmacy to receive TDAP Vaccine Due every 10 years please check your records to see when you last received this vaccine Shingles Vaccine Declined Influenza Antoine Hurt Due Every Fall Pneumonia Vaccine Received - you won't need this vaccine again Diabetes Screening Tests (at least every 3 years, Medicare covers annually or at 6-month intervals for prediabetic patients) Fasting blood sugar (FBS) or glucose tolerance test (GTT) Patient must be diagnosed with one of the following: 
-Hypertension, Dyslipidemia, obesity, previous impaired FBS or GTT 
Or any two of the following: overweight, FH of diabetes, age ? 72, history of gestational diabetes, birth of baby weighing more than 9 pounds Completed: 
 
4/27/17 Recommended annually DUE: 4/27/18 Diabetes Self-Management Training (DSMT) (no USPSTF recommendation) Requires referral by treating physician for patient with diabetes or renal disease. 10 hours of initial DSMT session of no less than 30 minutes each in a continuous 12-month period. 2 hours of follow-up DSMT in subsequent years. n/a Glaucoma Screening (no USPSTF recommendation) Diabetes mellitus, family history, , age 48 or over,  American, age 72 or over Completed: 
6/19/17 Recommended annually DUE: 6/2018 Human Immunodeficiency Virus (HIV) Screening (annually for increased risk patients) HIV-1 and HIV-2 by EIA, SANJAY, rapid antibody test, or oral mucosa transudate Patient must be at increased risk for HIV infection per USPSTF guidelines or pregnant. Tests covered annually for patients at increased risk. Pregnant patients may receive up to 3 test during pregnancy. N/A Medical Nutrition Therapy (MNT) (fordiabetes or renal disease not recommended schedule) Requires referral by treating physician for patient with diabetes or renal disease. Can be provided in same year as diabetes self-management training (DSMT), and CMS recommends medical nutrition therapy take place after DSMT. Up to 3 hours for initial year and 2 hours in subsequent years. N/A Hepatitis B Vaccinations (if medium/high risk) Medium/high risk factors:  End-stage renal disease, 
 Hemophiliacs who received Factor VIII or IX concentrates, Clients of institutions for the mentally retarded, Persons who live in the same house as a HepB virus carrier, Homosexual men, Illicit injectable drug abusers. N/A Screening Mammography (biennial age 54-69)? Annually (age 36 or over) Completed:  
 
 
Recommended annually N/A Screening Pap Tests and Pelvic Examination (up to age 79 and after 79 if unknown history or abnormal study last 10 years) Every 24 months except high risk Completed: 
 
 
 
Recommended every 2 years N/A Ultrasound Screening for Abdominal Aortic Aneurysm (AAA) (once) Patient must be referred through Counts include 234 beds at the [facility] and not have had a screening for abdominal aortic aneurysm before under Medicare. Limited to patients who meet one of the following criteria: 
- Men who are 73-68 years old and have smoked more than 100 cigarettes in their lifetime. 
-Anyone with a FH of AAA 
-Anyone recommended for screening by USPSTF Not covered by  
Medicare as preventive. N/A Thanks for coming in today. It was nice to spend some time with you. If you have any questions about your visit today, please call [phone] and ask to speak with Andres Patel. Introducing [facility]! Aj Sabillon introduces K[employer]arom patient portal. Now you can access parts of your medical record, email your doctor's office, and request medication refills online. 1. In your internet browser, go to [email or web address] 2. Click on the First Time User? Click Here link in the Sign In box. You will see the New Member Sign Up page. 3. Enter your K[employer] Access Code exactly as it appears below. You will not need to use this code after youve completed the sign-up process. If you do not sign up before the expiration date, you must request a new code. · [employer] Access Code: [email or web address] Expires: 9/16/2017  3:47 PM 
 
4.  Enter the last four digits of your Social Security Number (xxxx) and Date of Birth (mm/dd/yyyy) as indicated and click Submit. You will be taken to the next sign-up page. 5. Create a menuvox ID. This will be your menuvox login ID and cannot be changed, so think of one that is secure and easy to remember. 6. Create a menuvox password. You can change your password at any time. 7. Enter your Password Reset Question and Answer. This can be used at a later time if you forget your password. 8. Enter your e-mail address. You will receive e-mail notification when new information is available in [address] 9. Click Sign Up. You can now view and download portions of your medical record. 10. Click the Download Summary menu link to download a portable copy of your medical information. If you have questions, please visit the Frequently Asked Questions section of the menuvox website. Remember, menuvox is NOT to be used for urgent needs. For medical emergencies, dial 911. Now available from your iPhone and Android! Please provide this summary of care documentation to your next provider. Your primary care clinician is listed as Joana Sagastume. If you have any questions after today's visit, please call [phone].

## 2017-06-26 NOTE — PROGRESS NOTES
1. Have you been to the ER, urgent care clinic since your last visit? Hospitalized since your last visit?o    2. Have you seen or consulted any other health care providers outside of the [address] [place] [address] since your last visit? Include any pap smears or colon screening.  5/2017 Dr. Curtis Estrada MD in [place], seen for walking problems. Chief Complaint   Patient presents with   Valeriy Aceves Annual Wellness Visit     Medicare wellness     Not fasting.

## 2017-06-27 LAB
ALBUMIN SERPL-MCNC: 3.9 G/DL (ref 3.5–4.7)
ALBUMIN/GLOB SERPL: 1.5 {RATIO} (ref 1.2–2.2)
ALP SERPL-CCNC: 53 IU/L (ref 39–117)
ALT SERPL-CCNC: 8 IU/L (ref 0–32)
AST SERPL-CCNC: 16 IU/L (ref 0–40)
BILIRUB SERPL-MCNC: 0.4 MG/DL (ref 0–1.2)
BUN SERPL-MCNC: 9 MG/DL (ref 8–27)
BUN/CREAT SERPL: 8 (ref 12–28)
CALCIUM SERPL-MCNC: 10.3 MG/DL (ref 8.7–10.3)
CHLORIDE SERPL-SCNC: 101 MMOL/L (ref 96–106)
CO2 SERPL-SCNC: 28 MMOL/L (ref 18–29)
CREAT SERPL-MCNC: 1.2 MG/DL (ref 0.57–1)
GLOBULIN SER CALC-MCNC: 2.6 G/DL (ref 1.5–4.5)
GLUCOSE SERPL-MCNC: 89 MG/DL (ref 65–99)
INTERPRETATION: NORMAL
POTASSIUM SERPL-SCNC: 3.9 MMOL/L (ref 3.5–5.2)
PROT SERPL-MCNC: 6.5 G/DL (ref 6–8.5)
SODIUM SERPL-SCNC: 140 MMOL/L (ref 134–144)

## 2017-06-29 ENCOUNTER — OFFICE VISIT (OUTPATIENT)
Dept: NEUROLOGY | Age: 82
End: 2017-06-29

## 2017-06-29 VITALS
HEART RATE: 63 BPM | WEIGHT: 124 LBS | RESPIRATION RATE: 14 BRPM | OXYGEN SATURATION: 98 % | SYSTOLIC BLOOD PRESSURE: 124 MMHG | BODY MASS INDEX: 23.41 KG/M2 | HEIGHT: 61 IN | DIASTOLIC BLOOD PRESSURE: 64 MMHG

## 2017-06-29 DIAGNOSIS — F03.90 DEMENTIA WITHOUT BEHAVIORAL DISTURBANCE, UNSPECIFIED DEMENTIA TYPE: Primary | ICD-10-CM

## 2017-06-29 NOTE — PROGRESS NOTES
Spoke with patient's daughter Kenji Cox, HIPAA and two identifiers verified. Advised of lab results per Dr. Angel Diamond. Asks about Cholesterol levels- advised Lipids were not drawn, only CMP that has Glucose, Na, and kidney function tests. She verbalizes understanding and agrees to keeping patient hydrated.

## 2017-06-29 NOTE — MR AVS SNAPSHOT
Visit Information Date & Time Provider Department Dept. Phone Encounter #  
 6/29/2017  1:00 PM Lucie Tao MD [facility] at [address] [id number] Follow-up Instructions Return in about 6 months (around 12/29/2017). Your Appointments 7/30/2018 12:30 PM  
Medicare Physical with Lou Grace MD  
[facility] Internal Medicine [address]) Appt Note: [facility]   
 [facility] [address] [facility] [address]  
[address] [facility] [address] [place]  [facility] [address] Upcoming Health Maintenance Date Due  
 GLAUCOMA SCREENING Q2Y 5/8/1996 INFLUENZA AGE 9 TO ADULT 8/1/2017 MEDICARE YEARLY EXAM 6/27/2018 DTaP/Tdap/Td series (2 - Td) 6/26/2027 Allergies as of 6/29/2017  Review Complete On: 6/29/2017 By: Lucie Tao MD  
  
 Severity Noted Reaction Type Reactions Avapro [Irbesartan]  01/21/2011   Side Effect Other (comments) Hypotension and dizziness Crestor [Rosuvastatin]  01/21/2011   Side Effect Itching, Myalgia Pcn [Penicillins]  01/21/2011   Side Effect Rash Other reaction(s): hives Plaquenil [Hydroxychloroquine]  11/15/2016    Rash Quinine  01/21/2011   Side Effect Other (comments) Other reaction(s): other/intolerance \"ringing in my ears\" Ringing in ear Current Immunizations  Reviewed on 7/11/2016 Name Date Influenza High Dose Vaccine PF 11/1/2016 Influenza Vaccine 10/1/2010 Novel Influenza-H1N1-09, All Formulations 10/1/2010 Pneumococcal Polysaccharide (PPSV-23) 5/1/2010 Not reviewed this visit You Were Diagnosed With   
  
 Codes Comments Dementia without behavioral disturbance, unspecified dementia type    -  Primary ICD-10-CM: F03.90 ICD-9-CM: 294.20 Vitals BP Pulse Resp Height(growth percentile) Weight(growth percentile) SpO2  
 124/64 63 14 5' 1\" (1.549 m) 124 lb (56.2 kg) 98% BMI OB Status Smoking Status 23.43 kg/m2 Hysterectomy Former Smoker Vitals History BMI and BSA Data Body Mass Index Body Surface Area  
 23.43 kg/m 2 1.56 m 2 Preferred Pharmacy Pharmacy Name Phone [facility] [address] [place] [address] Dr Fuentes, [address] [place] [address]. [phone] Your Updated Medication List  
  
   
This list is accurate as of: 6/29/17  1:21 PM.  Always use your most recent med list.  
  
  
  
  
 aspirin delayed-release 81 mg tablet Take 81 mg by mouth daily. citalopram 10 mg tablet Commonly known as:  Ardie Brunette Take 10 mg by mouth daily. furosemide 20 mg tablet Commonly known as:  LASIX Take 1 Tab by mouth daily. KLOR-CON M10 10 mEq tablet Generic drug:  potassium chloride TAKE ONE TABLET BY MOUTH DAILY  
  
 metoprolol tartrate 50 mg tablet Commonly known as:  LOPRESSOR Take 1 Tab by mouth two (2) times a day. MIRALAX 17 gram packet Generic drug:  polyethylene glycol Take 17 g by mouth daily. multivitamin tablet Commonly known as:  ONE A DAY Take 1 Tab by mouth daily. raNITIdine 150 mg tablet Commonly known as:  ZANTAC Take 1 Tab by mouth daily. VITAMIN D3 1,000 unit Cap Generic drug:  cholecalciferol Take  by mouth daily. Follow-up Instructions Return in about 6 months (around 12/29/2017). Patient Instructions A Healthy Lifestyle: Care Instructions Your Care Instructions A healthy lifestyle can help you feel good, stay at a healthy weight, and have plenty of energy for both work and play. A healthy lifestyle is something you can share with your whole family. A healthy lifestyle also can lower your risk for serious health problems, such as high blood pressure, heart disease, and diabetes. You can follow a few steps listed below to improve your health and the health of your family. Follow-up care is a key part of your treatment and safety. Be sure to make and go to all appointments, and call your doctor if you are having problems. Its also a good idea to know your test results and keep a list of the medicines you take. How can you care for yourself at home? · Do not eat too much sugar, fat, or fast foods. You can still have dessert and treats now and then. The goal is moderation. · Start small to improve your eating habits. Pay attention to portion sizes, drink less juice and soda pop, and eat more fruits and vegetables. ¨ Eat a healthy amount of food. A 3-ounce serving of meat, for example, is about the size of a deck of cards. Fill the rest of your plate with vegetables and whole grains. ¨ Limit the amount of soda and sports drinks you have every day. Drink more water when you are thirsty. ¨ Eat at least 5 servings of fruits and vegetables every day. It may seem like a lot, but it is not hard to reach this goal. A serving or helping is 1 piece of fruit, 1 cup of vegetables, or 2 cups of leafy, raw vegetables. Have an apple or some carrot sticks as an afternoon snack instead of a candy bar. Try to have fruits and/or vegetables at every meal. 
· Make exercise part of your daily routine. You may want to start with simple activities, such as walking, bicycling, or slow swimming. Try to be active 30 to 60 minutes every day. You do not need to do all 30 to 60 minutes all at once. For example, you can exercise 3 times a day for 10 or 20 minutes. Moderate exercise is safe for most people, but it is always a good idea to talk to your doctor before starting an exercise program. 
· Keep moving. Lg Crooked the lawn, work in the garden, or [employer]. Take the stairs instead of the elevator at work. · If you smoke, quit. People who smoke have an increased risk for heart attack, stroke, cancer, and other lung illnesses.  Quitting is hard, but there are ways to boost your chance of quitting tobacco for good. ¨ Use nicotine gum, patches, or lozenges. ¨ Ask your doctor about stop-smoking programs and medicines. ¨ Keep trying. In addition to reducing your risk of diseases in the future, you will notice some benefits soon after you stop using tobacco. If you have shortness of breath or asthma symptoms, they will likely get better within a few weeks after you quit. · Limit how much alcohol you drink. Moderate amounts of alcohol (up to 2 drinks a day for men, 1 drink a day for women) are okay. But drinking too much can lead to liver problems, high blood pressure, and other health problems. Family health If you have a family, there are many things you can do together to improve your health. · Eat meals together as a family as often as possible. · Eat healthy foods. This includes fruits, vegetables, lean meats and dairy, and whole grains. · Include your family in your fitness plan. Most people think of activities such as jogging or tennis as the way to fitness, but there are many ways you and your family can be more active. Anything that makes you breathe hard and gets your heart pumping is exercise. Here are some tips: 
¨ Walk to do errands or to take your child to school or the bus. ¨ Go for a family bike ride after dinner instead of watching TV. Where can you learn more? Go to http://melvina-bridger.info/. Enter P226 in the search box to learn more about \"A Healthy Lifestyle: Care Instructions. \" Current as of: July 26, 2016 Content Version: 11.3 © 9670-3700 [employer]. Care instructions adapted under license by [employer] (which disclaims liability or warranty for this information). If you have questions about a medical condition or this instruction, always ask your healthcare professional. [place] [address] any warranty or liability for your use of this information. Introducing [facility]! Gerri [place] introduces enEvolv patient portal. Now you can access parts of your medical record, email your doctor's office, and request medication refills online. 1. In your internet browser, go to [email or web address] 2. Click on the First Time User? Click Here link in the Sign In box. You will see the New Member Sign Up page. 3. Enter your enEvolv Access Code exactly as it appears below. You will not need to use this code after youve completed the sign-up process. If you do not sign up before the expiration date, you must request a new code. · enEvolv Access Code: [email or web address] Expires: 9/16/2017  3:47 PM 
 
4. Enter the last four digits of your Social Security Number (xxxx) and Date of Birth (mm/dd/yyyy) as indicated and click Submit. You will be taken to the next sign-up page. 5. Create a enEvolv ID. This will be your enEvolv login ID and cannot be changed, so think of one that is secure and easy to remember. 6. Create a enEvolv password. You can change your password at any time. 7. Enter your Password Reset Question and Answer. This can be used at a later time if you forget your password. 8. Enter your e-mail address. You will receive e-mail notification when new information is available in [address] 9. Click Sign Up. You can now view and download portions of your medical record. 10. Click the Download Summary menu link to download a portable copy of your medical information. If you have questions, please visit the Frequently Asked Questions section of the enEvolv website. Remember, enEvolv is NOT to be used for urgent needs. For medical emergencies, dial 911. Now available from your iPhone and Android! Please provide this summary of care documentation to your next provider. Your primary care clinician is listed as Danielle Hope. If you have any questions after today's visit, please call [phone].

## 2017-06-29 NOTE — PROGRESS NOTES
Chief Complaint   Patient presents with    Memory Loss         HISTORY OF PRESENT ILLNESS  Shaggy Guillaume came back for a follow up. She is doing well per family. Her memory has been stable and her overall functionality has improved. She has recovered from her hip surgery and is now able to walk with a cane. She still needs assistance with the some activities of daily living. Back pain is better. 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 She was tried on donepezil but she did not take it . She was having mood swings and was on Depakote at some point but this has also been discontinued. She now takes Celexa. Current Outpatient Prescriptions   Medication Sig    citalopram (CELEXA) 10 mg tablet Take 10 mg by mouth daily.  KLOR-CON M10 10 mEq tablet TAKE ONE TABLET BY MOUTH DAILY    aspirin delayed-release 81 mg tablet Take 81 mg by mouth daily.  furosemide (LASIX) 20 mg tablet Take 1 Tab by mouth daily.  metoprolol tartrate (LOPRESSOR) 50 mg tablet Take 1 Tab by mouth two (2) times a day. (Patient taking differently: Take 50 mg by mouth daily.)    cholecalciferol (VITAMIN D3) 1,000 unit cap Take  by mouth daily.  multivitamin (ONE A DAY) tablet Take 1 Tab by mouth daily.  polyethylene glycol (MIRALAX) 17 gram packet Take 17 g by mouth daily.  ranitidine (ZANTAC) 150 mg tablet Take 1 Tab by mouth daily. No current facility-administered medications for this visit. PHYSICAL EXAMINATION:    Visit Vitals    /64    Pulse 63    Resp 14    Ht 5' 1\" (1.549 m)    Wt 56.2 kg (124 lb)    SpO2 98%    BMI 23.43 kg/m2       NEUROLOGICAL EXAMINATION:     Mental Status:   Alert. She is able to answer all simple questions. She knows today's date, her date of birth, her street address. Could not do serial 7 subtractions. Knows the name of the presidenIt and president elect.   Delayed recall is 1 out of 5.  she scored 21/30 on Bryant cognitive assessment    Cranial Nerves:    II, III, IV, VI:  Visual acuity grossly intact. Visual fields are normal.    Pupils are equal, round, and reactive to light and accommodation. Extra-ocular movements are full and fluid. Fundoscopic exam was benign, no ptosis or nystagmus. V-XII: Hearing is grossly intact. Facial features are symmetric, with normal sensation and strength. The palate rises symmetrically and the tongue protrudes midline. Sternocleidomastoids 5/5. Motor Examination: Normal tone, bulk, and strength. 5/5 muscle strength throughout. No cogwheel rigidity or clonus present. Sensory exam:  Normal throughout to pinprick, temperature, and vibration sense. Normal proprioception. Coordination:  Heel-to-shin was smooth and symmetrical bilaterally. Finger to nose and rapid arm movement testing was normal.   No resting or intention tremor    Gait and Station:  On a wheelchair, but able to walk with minimal assistance. No muscle wasting or fasiculations noted. Reflexes:  DTRs 2+ throughout. Toes downgoing. LABS:  B12, and TSH was normal      ASSESSMENT    ICD-10-CM ICD-9-CM    1. Dementia without behavioral disturbance, unspecified dementia type F03.90 294.20        DISCUSSION  Ms. Purvi Wood has mild mixed type of dementia. She has remained stable and actually her cognitive score is slightly better today than 6 months ago  We will continue to monitor her without any pharmacologic therapy  Continue Celexa  She is currently in a supervised situation and does not drive  Follow up in 6 months     I spent greater than 25 minutes with the patient and more than 50% of the time was spent in counseling and coordination of care.       Skylar Betts MD  Diplomate, American Board of Psychiatry & Neurology (Neurology)  [place] Board of Psychiatry & Neurology (Clinical Neurophysiology)  Diplomate, American Board of Electrodiagnostic Medicine

## 2017-06-29 NOTE — PATIENT INSTRUCTIONS

## 2017-06-30 DIAGNOSIS — R13.19 OTHER DYSPHAGIA: Primary | ICD-10-CM

## 2017-06-30 RX ORDER — CITALOPRAM 10 MG/1
10 TABLET ORAL DAILY
Qty: 90 TAB | Refills: 1 | Status: SHIPPED | OUTPATIENT
Start: 2017-06-30 | End: 2018-02-13 | Stop reason: SDUPTHER

## 2017-07-03 ENCOUNTER — DOCUMENTATION ONLY (OUTPATIENT)
Dept: INTERNAL MEDICINE CLINIC | Age: 82
End: 2017-07-03

## 2017-07-05 ENCOUNTER — HOSPITAL ENCOUNTER (OUTPATIENT)
Dept: GENERAL RADIOLOGY | Age: 82
Discharge: HOME | End: 2017-07-05
Attending: INTERNAL MEDICINE
Payer: MEDICARE

## 2017-07-05 DIAGNOSIS — R13.19 OTHER DYSPHAGIA: ICD-10-CM

## 2017-07-05 PROCEDURE — 74220 X-RAY XM ESOPHAGUS 1CNTRST: CPT

## 2017-07-05 NOTE — PROGRESS NOTES
Please ask her daughter if she has speech therapist. They have to adjust her diet according to the result. Thanks!

## 2017-07-05 NOTE — PROGRESS NOTES
Spoke with dtr who asked MULTIPLE QUESTIONS, this writer had no idea how to answer. I do know that a speech therapist was going out and seeing her but they dc'd her not long ago. I can get a new order and have them go out to see pt but will need to know more of the reason why. Can you explain the barium swallow to me?

## 2017-07-07 DIAGNOSIS — R93.3 ABNORMAL BARIUM SWALLOW: Primary | ICD-10-CM

## 2017-07-17 DIAGNOSIS — I10 ESSENTIAL HYPERTENSION, BENIGN: Primary | ICD-10-CM

## 2017-07-17 DIAGNOSIS — E78.00 HYPERCHOLESTEROLEMIA: ICD-10-CM

## 2017-07-17 DIAGNOSIS — R60.0 EDEMA OF BOTH LEGS: ICD-10-CM

## 2017-08-01 ENCOUNTER — OFFICE VISIT (OUTPATIENT)
Dept: CARDIOLOGY CLINIC | Age: 82
End: 2017-08-01

## 2017-08-01 VITALS
BODY MASS INDEX: 23.49 KG/M2 | SYSTOLIC BLOOD PRESSURE: 130 MMHG | HEIGHT: 61 IN | DIASTOLIC BLOOD PRESSURE: 80 MMHG | RESPIRATION RATE: 14 BRPM | WEIGHT: 124.4 LBS | HEART RATE: 80 BPM

## 2017-08-01 DIAGNOSIS — E78.2 MIXED HYPERLIPIDEMIA: Primary | ICD-10-CM

## 2017-08-01 DIAGNOSIS — I11.9 BENIGN HYPERTENSIVE HEART DISEASE WITHOUT HEART FAILURE: ICD-10-CM

## 2017-08-01 DIAGNOSIS — R06.09 DYSPNEA ON EXERTION: ICD-10-CM

## 2017-08-01 DIAGNOSIS — R07.9 CHEST PAIN, UNSPECIFIED TYPE: ICD-10-CM

## 2017-08-01 DIAGNOSIS — I10 ESSENTIAL HYPERTENSION, BENIGN: ICD-10-CM

## 2017-08-01 DIAGNOSIS — R60.0 LOCALIZED EDEMA: ICD-10-CM

## 2017-08-01 DIAGNOSIS — I25.10 ATHEROSCLEROSIS OF NATIVE CORONARY ARTERY OF NATIVE HEART WITHOUT ANGINA PECTORIS: ICD-10-CM

## 2017-08-01 RX ORDER — ACETAMINOPHEN 325 MG/1
325 TABLET ORAL AS NEEDED
COMMUNITY

## 2017-08-01 RX ORDER — PRAVASTATIN SODIUM 10 MG/1
10 TABLET ORAL DAILY
Qty: 90 TAB | Refills: 3 | Status: SHIPPED | OUTPATIENT
Start: 2017-08-01 | End: 2018-06-20 | Stop reason: SDUPTHER

## 2017-08-01 RX ORDER — PANTOPRAZOLE SODIUM 40 MG/1
40 TABLET, DELAYED RELEASE ORAL DAILY
Qty: 90 TAB | Refills: 1 | Status: SHIPPED | OUTPATIENT
Start: 2017-08-01 | End: 2017-11-22 | Stop reason: ALTCHOICE

## 2017-08-01 RX ORDER — METOPROLOL TARTRATE 50 MG/1
50 TABLET ORAL 2 TIMES DAILY
Qty: 180 TAB | Refills: 3 | Status: SHIPPED | OUTPATIENT
Start: 2017-08-01 | End: 2018-08-09 | Stop reason: SDUPTHER

## 2017-08-01 NOTE — PROGRESS NOTES
[facility]  [phone]    Reason for consult: LE edema  Requesting physician: Dr. Nisa Antoine    HPI: Richard Galvez, a 80y.o. year-old who presents for evaluation of LE edema. Her family is with her today, patient has some dementia, followed by neurology  She had a hip replacement last year and spent some time in inpatient rehab afterwards then she was discharged to home with PT  Had intermittent LE edema during that time but worse recently in the last 3 months  Sometimes add salt to food but not frequently, she is wearing compression stockings daily  She is not very active, walks with a cane  No dyspnea with exertion but limited mobility  Denies any PND or orthopnea  Has nightly chest pain while lying down, feels like a pressure, can't say how long it lasts because she goes to sleep before it resolves  A few times she has woken up in the morning and the pressure is still there  Denies any palpitations  No syncope since her fall in 8/16 at rehab facility (details surrounding her fall are uncertain)   She has been coughing at night and after she eats, just had esophagram which showed delayed swallowing  Reviewed home medication list - she is only taking metoprolol tartrate once daily   Denies hx of CVA, aneurysm, DM     Assessment/Plan:  1. Chest pain - with hx of CAD will order lexiscan nuclear stress test to assess for ischemia, will also begin Protonix 40mg daily x 6 weeks to see if her symptoms may be GI related, will reassess symptoms at her follow up visit  2. CAD - s/p PCI to mid RCA in 2007, patent stent in mid RCA and non-obstructive CAD noted on cardiac cath in 2012  -continue ASA 81mg daily, advised her to begin taking Metoprolol 50mg BID and pravastatin as below  3.   Dyslipidemia - not currently on statin, no recent lipids available, had rash/hives with Crestor in the past, will begin pravastatin 10mg daily and  LE edema - continue lasix 20mg daily and continue wearing compression stockings, will order venous duplex to assess for DVT and TTE to assess cardiac structure and function  4. HTN- well controlled on metoprolol, had dizziness and hypotension with losartan in the past  5. Hx of SLE and Sjogren's - managed by Dr. Maria D Tinoco  6. Non-Hodgkin's Lymphoma 2014 - had chemo and radiation  7. Dementia - on aricept, managed by neurology     Cardiac Cath 9/2012 - mid LAD with 30-40% lesion at D2, 70% ostial D2 lesion unchanged, MLI in LCX, dominant RCA with MLI and mid RCA stent with minimal ISR, LVEF 65%  Cardiac Cath 2007 - received 3.0 x 12 mm Taxus Monorail stent to mid RCA    Fam Hx: mother with aortic aneurysm at age 80, father passed from cancer, brother passed from [employer] living\" in his 76s  Soc Hx: quit tobacco > 30 years ago, used to smoke 1/2 ppd, drinks a rare glass of wine, no etoh use     She  has a past medical history of CAD (coronary artery disease); Chronic airway obstruction, not elsewhere classified; Closed left hip fracture ([address] Decubitus ulcer of sacral region, stage 3 ([address] (8/18/2016); Dyspnea on exertion; Echocardiogram (12/02/2010); Essential hypertension, benign; GERD (gastroesophageal reflux disease); Headache; History of myocardial perfusion scan (09/14/2012); Hypercholesterolemia; Lower extremity venous duplex (05/08/2013); Lupus erythematosus (1992); Lymphoma, non-Hodgkin's ([address] (5/2011); Pneumothorax (1981); S/P cardiac cath (09/24/2012); Short-term memory loss; Sjogren's syndrome ([address] (1992); and Traumatic subdural hematoma ([address] (2003). Cardiovascular ROS: positive for chest pain and dyspnea on exertion  Respiratory ROS: positive for cough  Neurological ROS: no TIA or stroke symptoms  All other systems negative except as above. PE  Vitals:    08/01/17 1050   BP: 130/80   Pulse: 80   Resp: 14   Weight: 124 lb 6.4 oz (56.4 kg)   Height: 5' 1\" (1.549 m)    Body mass index is 23.51 kg/(m^2).    General appearance - alert, well appearing, and in no distress  Mental status - affect appropriate to mood  Eyes - sclera anicteric, moist mucous membranes  Neck - supple  Lymphatics - not assessed  Chest - clear to auscultation, no wheezes, rales or rhonchi  Heart - normal rate, regular rhythm, normal S1, S2, 2/6 ROSA at LUSB   Abdomen - soft, nontender, nondistended, no masses or organomegaly  Back exam - full range of motion, no tenderness  Neurological - cranial nerves II through XII grossly intact, no focal deficit  Musculoskeletal - no muscular tenderness noted, normal strength  Extremities - peripheral pulses normal, 1+ LE edema bilaterally  Skin - normal coloration  no rashes    12 lead ECG: NSR with non-specific ST-T wave changes    Recent Labs:  Lab Results   Component Value Date/Time    Cholesterol, total 176 06/08/2015 12:00 AM    HDL Cholesterol 82 06/08/2015 12:00 AM    LDL, calculated 82 06/08/2015 12:00 AM    Triglyceride 60 06/08/2015 12:00 AM    CHOL/HDL Ratio 2.2 05/03/2011 09:05 AM     Lab Results   Component Value Date/Time    Creatinine 1.20 06/26/2017 12:15 PM     Lab Results   Component Value Date/Time    BUN 9 06/26/2017 12:15 PM     Lab Results   Component Value Date/Time    Potassium 3.9 06/26/2017 12:15 PM     No results found for: HBA1C, HGBE8, BPR7PXBU, EAM6IGVA  Lab Results   Component Value Date/Time    HGB 12.0 04/27/2017 06:44 PM     Lab Results   Component Value Date/Time    PLATELET 073 39/14/7795 06:44 PM       Reviewed:  Past Medical History:   Diagnosis Date    CAD (coronary artery disease)     Chronic    Chronic airway obstruction, not elsewhere classified     Frequent cough, wheezing secondary to lupus and COPD    Closed left hip fracture (HCC)     Decubitus ulcer of sacral region, stage 3 ([address] 8/18/2016    Dyspnea on exertion     Echocardiogram 12/02/2010    EF 60-65%. Gr 1 DDfx. Mild LVH.   Mild MR.    Essential hypertension, benign     GERD (gastroesophageal reflux disease)     Headache     History of myocardial perfusion scan 09/14/2012    No evidence of ischemia or infarction. Very mild apical thinning. EF 77%. No WMA. TID 1.46, suggests 3-vessel CAD. Intermediate to high risk pharm stress test due to TID.  Hypercholesterolemia     Lower extremity venous duplex 05/08/2013    Left leg:  No venous thrombosis or venous insufficiency.  Lupus erythematosus 1992    Lymphoma, non-Hodgkin's ([facility] 5/2011    Low-grade being followed by Dr. Tara Bates without therapy currently    Pneumothorax 1981    Blebs in right lung with recurrent Pneumothorax    S/P cardiac cath 09/24/2012    Hvy calcification of coronary arteries. LM minimal.  mLAD 35%. oD2 70% (unchanged). LCX mild. pRCA mild. Minimal ISR of prev stent. LVEDP 14-16. EF 65%. No WMA. Likely a falsely abnormal stress test.    Short-term memory loss     From fall in 2003    Sjogren's syndrome ([facility] 1992    report of ROGELIO, SSA, RF positive    Traumatic subdural hematoma ([facility] 2003    Head injury with subdural - s/p fall     History   Smoking Status    Former Smoker    Quit date: 1/1/1981   Smokeless Tobacco    Never Used     History   Alcohol Use    Yes     Comment: once a month drinks wine     Allergies   Allergen Reactions    Avapro [Irbesartan] Other (comments)     Hypotension and dizziness    Crestor [Rosuvastatin] Itching and Myalgia    Pcn [Penicillins] Rash     Other reaction(s): hives    Plaquenil [Hydroxychloroquine] Rash    Quinine Other (comments)     Other reaction(s): other/intolerance  \"ringing in my ears\"  Ringing in ear       Current Outpatient Prescriptions   Medication Sig    acetaminophen (TYLENOL) 325 mg tablet Take 325 mg by mouth as needed for Pain.  metoprolol tartrate (LOPRESSOR) 50 mg tablet Take 1 Tab by mouth two (2) times a day.  pravastatin (PRAVACHOL) 10 mg tablet Take 1 Tab by mouth daily.  pantoprazole (PROTONIX) 40 mg tablet Take 1 Tab by mouth daily.     citalopram (CELEXA) 10 mg tablet Take 1 Tab by mouth daily.  KLOR-CON M10 10 mEq tablet TAKE ONE TABLET BY MOUTH DAILY    aspirin delayed-release 81 mg tablet Take 81 mg by mouth daily.  furosemide (LASIX) 20 mg tablet Take 1 Tab by mouth daily.  cholecalciferol (VITAMIN D3) 1,000 unit cap Take  by mouth daily.  multivitamin (ONE A DAY) tablet Take 1 Tab by mouth daily.  polyethylene glycol (MIRALAX) 17 gram packet Take 17 g by mouth daily.  ranitidine (ZANTAC) 150 mg tablet Take 1 Tab by mouth daily. No current facility-administered medications for this visit.         Jae Bruno MD  [facility] [address], [address] [facility] [address]  [place]  [phone]

## 2017-08-01 NOTE — MR AVS SNAPSHOT
Visit Information Date & Time Provider Department Dept. Phone Encounter #  
 8/1/2017 10:40 AM Antony Botello MD [facility] Estelita Bhat [phone] [id number] Your Appointments 8/11/2017 11:00 AM  
VASCULAR TEST with VASCULAR, [facility] ([facility] SCHEDULING) Appt Note: Per Dr. Josiah Duffy Venous @9 dx swelling, DVT, Lexiscan Nuc dx CAD, CP, Echo dx swelling, SOB ht 5'1 wt 124lb [address] [place]  
[address] [facility] [address]  
  
    
 8/11/2017 12:00 PM  
NUCLEAR MEDICINE with NUCLEARJONN [facility] ([facility] SCHEDULING) Appt Note: Per Dr. Josiah Duffy Venous @9 dx swelling, DVT, Lexiscan Nuc dx CAD, CP, Echo dx swelling, SOB ht 5'1 wt 124lb [address] [place]  
[phone]  
  
   
 [address] [facility] [address]  
  
    
 8/11/2017  3:00 PM  
ECHO CARDIOGRAMS 2D with Norberto Cowan [facility] ([facility] SCHEDULING) Appt Note: Per Dr. Josiah Duffy Venous @9 dx swelling, DVT, Lexiscan Nuc dx CAD, CP, Echo dx swelling, SOB ht 5'1 wt 124lb [address] [place]  
[address] [facility] [address]  
  
    
 1/8/2018  1:40 PM  
Follow Up with Nuha Sullivan MD  
[facility] [address] [facility]) Appt Note: 6 month follow up KRU 6/29  
 [address] [facility] 207 [facility] [address]  
[phone]  
  
   
 5855 [address] [facility] [address]  
  
    
 7/30/2018 12:30 PM  
Medicare Physical with Maren Strong MD  
[facility] [address] [facility]) Appt Note: [facility]   
 [facility] [address]  
[address] [address] [place] [address] Upcoming Health Maintenance Date Due  
 GLAUCOMA SCREENING Q2Y 5/8/1996 INFLUENZA AGE 9 TO ADULT 8/1/2017 MEDICARE YEARLY EXAM 6/27/2018 DTaP/Tdap/Td series (2 - Td) 6/26/2027 Allergies as of 8/1/2017  Review Complete On: 6/29/2017 By: Taya Spence MD  
  
 Severity Noted Reaction Type Reactions Avapro [Irbesartan]  01/21/2011   Side Effect Other (comments) Hypotension and dizziness Crestor [Rosuvastatin]  01/21/2011   Side Effect Itching, Myalgia Pcn [Penicillins]  01/21/2011   Side Effect Rash Other reaction(s): hives Plaquenil [Hydroxychloroquine]  11/15/2016    Rash Quinine  01/21/2011   Side Effect Other (comments) Other reaction(s): other/intolerance \"ringing in my ears\" Ringing in ear Current Immunizations  Reviewed on 7/11/2016 Name Date Influenza High Dose Vaccine PF 11/1/2016 Influenza Vaccine 10/1/2010 Novel Influenza-H1N1-09, All Formulations 10/1/2010 Pneumococcal Polysaccharide (PPSV-23) 5/1/2010 Not reviewed this visit You Were Diagnosed With   
  
 Codes Comments Mixed hyperlipidemia    -  Primary ICD-10-CM: V27.9 ICD-9-CM: 272.2 Essential hypertension, benign     ICD-10-CM: I10 
ICD-9-CM: 401.1 Benign hypertensive heart disease without heart failure     ICD-10-CM: I11.9 ICD-9-CM: 402.10 Atherosclerosis of native coronary artery of native heart without angina pectoris     ICD-10-CM: I25.10 ICD-9-CM: 414.01 Localized edema     ICD-10-CM: R60.0 ICD-9-CM: 868. 3 Chest pain, unspecified type     ICD-10-CM: R07.9 ICD-9-CM: 786.50 Dyspnea on exertion     ICD-10-CM: R06.09 
ICD-9-CM: 786.09 Vitals BP Pulse Resp Height(growth percentile) Weight(growth percentile) BMI  
 130/80 (BP 1 Location: Right arm, BP Patient Position: Sitting) 80 14 5' 1\" (1.549 m) 124 lb 6.4 oz (56.4 kg) 23.51 kg/m2 OB Status Smoking Status Hysterectomy Former Smoker Vitals History BMI and BSA Data Body Mass Index Body Surface Area  
 23.51 kg/m 2 1.56 m 2 Preferred Pharmacy Pharmacy Name Phone Darya MARMOLEJO [address]. [phone] Your Updated Medication List  
  
   
This list is accurate as of: 8/1/17 12:05 PM.  Always use your most recent med list.  
  
  
  
  
 acetaminophen 325 mg tablet Commonly known as:  TYLENOL Take 325 mg by mouth as needed for Pain. aspirin delayed-release 81 mg tablet Take 81 mg by mouth daily. citalopram 10 mg tablet Commonly known as:  Linnell [place] Take 1 Tab by mouth daily. furosemide 20 mg tablet Commonly known as:  LASIX Take 1 Tab by mouth daily. KLOR-CON M10 10 mEq tablet Generic drug:  potassium chloride TAKE ONE TABLET BY MOUTH DAILY  
  
 metoprolol tartrate 50 mg tablet Commonly known as:  LOPRESSOR Take 1 Tab by mouth two (2) times a day. MIRALAX 17 gram packet Generic drug:  polyethylene glycol Take 17 g by mouth daily. multivitamin tablet Commonly known as:  ONE A DAY Take 1 Tab by mouth daily. pantoprazole 40 mg tablet Commonly known as:  PROTONIX Take 1 Tab by mouth daily. pravastatin 10 mg tablet Commonly known as:  PRAVACHOL Take 1 Tab by mouth daily. raNITIdine 150 mg tablet Commonly known as:  ZANTAC Take 1 Tab by mouth daily. VITAMIN D3 1,000 unit Cap Generic drug:  cholecalciferol Take  by mouth daily. Prescriptions Sent to Pharmacy Refills  
 metoprolol tartrate (LOPRESSOR) 50 mg tablet 3 Sig: Take 1 Tab by mouth two (2) times a day. Class: Normal  
 Pharmacy: Darya Mccall [address] [place] [address] RD. Ph #: [phone] Route: Oral  
 pravastatin (PRAVACHOL) 10 mg tablet 3 Sig: Take 1 Tab by mouth daily. Class: Normal  
 Pharmacy: Darya Mccall [address] [place] [address] RD.  Ph #: [phone] Route: Oral  
 pantoprazole (PROTONIX) 40 mg tablet 1 Sig: Take 1 Tab by mouth daily. Class: Normal  
 Pharmacy: Laura Maravilla [address], [address] [place] [address].  #: [phone] Route: Oral  
  
We Performed the Following AMB POC EKG ROUTINE W/ 12 LEADS, INTER & REP [66250 CPT(R)] DUPLEX LOWER EXT VENOUS BILAT V325528 CPT(R)] To-Do List   
 08/01/2017 ECHO:  2D ECHO COMPLETE ADULT (TTE) W OR WO CONTR   
  
 08/01/2017 ECG:  STRESS TEST LEXISCAN/CARDIOLITE Patient Instructions Please begin taking pravastatin 10mg daily for your history of coronary artery disease. This is a statin medication for your cholesterol. A potential side effect of this medication is muscle aches. Please notify your cardiologist if you begin to experience this side effect. You will need blood tests in 3 months to check your cholesterol while taking this medication. Please begin taking Metoprolol tartrate 50mg twice daily for your history of coronary artery disease as well. Please begin taking Protonix 40mg daily to see if your esophagus and stomach issues improve thereby improving your night time coughing and chest pain. Introducing [facility] & HEALTH SERVICES! [employer] Fu[employer] introduces Fourandhalf patient portal. Now you can access parts of your medical record, email your doctor's office, and request medication refills online. 1. In your internet browser, go to [email or web address] 2. Click on the First Time User? Click Here link in the Sign In box. You will see the New Member Sign Up page. 3. Enter your Fourandhalf Access Code exactly as it appears below. You will not need to use this code after youve completed the sign-up process. If you do not sign up before the expiration date, you must request a new code. · Fourandhalf Access Code: [email or web address] Expires: 9/16/2017  3:47 PM 
 
 4. Enter the last four digits of your Social Security Number (xxxx) and Date of Birth (mm/dd/yyyy) as indicated and click Submit. You will be taken to the next sign-up page. 5. Create a Survela ID. This will be your Survela login ID and cannot be changed, so think of one that is secure and easy to remember. 6. Create a Survela password. You can change your password at any time. 7. Enter your Password Reset Question and Answer. This can be used at a later time if you forget your password. 8. Enter your e-mail address. You will receive e-mail notification when new information is available in [address] 9. Click Sign Up. You can now view and download portions of your medical record. 10. Click the Download Summary menu link to download a portable copy of your medical information. If you have questions, please visit the Frequently Asked Questions section of the Survela website. Remember, Survela is NOT to be used for urgent needs. For medical emergencies, dial 911. Now available from your iPhone and Android! Please provide this summary of care documentation to your next provider. Your primary care clinician is listed as Sabino Hart. If you have any questions after today's visit, please call [phone].

## 2017-08-01 NOTE — PATIENT INSTRUCTIONS
Please begin taking pravastatin 10mg daily for your history of coronary artery disease. This is a statin medication for your cholesterol. A potential side effect of this medication is muscle aches. Please notify your cardiologist if you begin to experience this side effect. You will need blood tests in 3 months to check your cholesterol while taking this medication. Please begin taking Metoprolol tartrate 50mg twice daily for your history of coronary artery disease as well. Please begin taking Protonix 40mg daily to see if your esophagus and stomach issues improve thereby improving your night time coughing and chest pain.

## 2017-08-11 ENCOUNTER — CLINICAL SUPPORT (OUTPATIENT)
Dept: CARDIOLOGY CLINIC | Age: 82
End: 2017-08-11

## 2017-08-11 DIAGNOSIS — R60.0 LOCALIZED EDEMA: ICD-10-CM

## 2017-08-11 DIAGNOSIS — Z98.61 HISTORY OF PTCA: ICD-10-CM

## 2017-08-11 DIAGNOSIS — R06.09 DYSPNEA ON EXERTION: ICD-10-CM

## 2017-08-11 DIAGNOSIS — R07.9 CHEST PAIN, UNSPECIFIED TYPE: ICD-10-CM

## 2017-08-11 DIAGNOSIS — R60.0 BILATERAL LEG EDEMA: Primary | ICD-10-CM

## 2017-08-11 DIAGNOSIS — I25.10 ATHEROSCLEROSIS OF NATIVE CORONARY ARTERY OF NATIVE HEART WITHOUT ANGINA PECTORIS: ICD-10-CM

## 2017-08-11 NOTE — PROGRESS NOTES
See scanned document. Ordering Doctor:Dr. Rhett Gilbert  Reading Doctor:Dr. Rhett Gilbert    Patient tolerated procedure well.

## 2017-08-11 NOTE — PROCEDURES
[facility]  *** FINAL REPORT ***    Name: Dori Jaramillo  MRN: [id number]      Outpatient  : 08 May 1931  HIS Order #: [id number]  [address] [facility] Visit #: [id number]  Date: 11 Aug 2017    TYPE OF TEST: Peripheral Venous Testing    REASON FOR TEST  Limb swelling    Right Leg:-  Deep venous thrombosis:           No  Superficial venous thrombosis:    No  Deep venous insufficiency:        No  Superficial venous insufficiency: No    Left Leg:-  Deep venous thrombosis:           No  Superficial venous thrombosis:    No  Deep venous insufficiency:        No  Superficial venous insufficiency: No      INTERPRETATION/FINDINGS  PROCEDURE:  BILATERAL LE VENOUS DUPLEX. Evaluation of lower extremity veins with ultrasound (B-mode imaging,  pulsed Doppler, color Doppler). Includes the common femoral, deep  femoral, femoral, popliteal, posterior tibial, peroneal, and great  saphenous veins. FINDINGS:  Kurt Constant scale and color flow duplex images of the veins in  both lower extremities demonstrate normal compressibility, spontaneous   and augmented flow profiles, and absence of filling defects  throughout the deep and superficial veins in both lower extremities. Pulsatile flow noted bilaterally. CONCLUSION:  Bilateral lower extremity venous duplex negative for deep   venous thrombosis or thrombophlebitis. Pulsatile venous flow is  observed, consistent with increased central venous pressure. ADDITIONAL COMMENTS    I have personally reviewed the data relevant to the interpretation of  this  study. TECHNOLOGIST: JENIFER Sargent  Signed: 2017 11:48 AM    PHYSICIAN: Lala Adams.  Rhett Gilbert MD  Signed: 2017 02:12 PM

## 2017-08-14 ENCOUNTER — TELEPHONE (OUTPATIENT)
Dept: CARDIOLOGY CLINIC | Age: 82
End: 2017-08-14

## 2017-08-28 ENCOUNTER — OFFICE VISIT (OUTPATIENT)
Dept: CARDIOLOGY CLINIC | Age: 82
End: 2017-08-28

## 2017-08-28 VITALS
DIASTOLIC BLOOD PRESSURE: 80 MMHG | HEIGHT: 61 IN | SYSTOLIC BLOOD PRESSURE: 130 MMHG | HEART RATE: 60 BPM | WEIGHT: 126.6 LBS | BODY MASS INDEX: 23.9 KG/M2 | RESPIRATION RATE: 16 BRPM

## 2017-08-28 DIAGNOSIS — R60.0 LOCALIZED EDEMA: ICD-10-CM

## 2017-08-28 DIAGNOSIS — R07.89 OTHER CHEST PAIN: ICD-10-CM

## 2017-08-28 DIAGNOSIS — I34.0 NON-RHEUMATIC MITRAL REGURGITATION: Primary | ICD-10-CM

## 2017-08-28 DIAGNOSIS — I10 ESSENTIAL HYPERTENSION: ICD-10-CM

## 2017-08-28 DIAGNOSIS — I36.1 NON-RHEUMATIC TRICUSPID VALVE INSUFFICIENCY: ICD-10-CM

## 2017-08-28 RX ORDER — FUROSEMIDE 40 MG/1
40 TABLET ORAL DAILY
Qty: 90 TAB | Refills: 1 | Status: SHIPPED | OUTPATIENT
Start: 2017-08-28 | End: 2018-06-14 | Stop reason: SDUPTHER

## 2017-08-28 NOTE — PATIENT INSTRUCTIONS
Please increase your Lasix/furosemide to 40mg daily (you can take two 20mg tablets daily until you run out of your current prescription)  Once you  the new prescription you will take one tablet daily  Please have labs drawn prior to your follow up with Dr. Latha Tsai in 6 weeks  Please discuss your chest pain and coughing with eating and recent chest xray/possible infection with Dr. Ben Bynum

## 2017-08-28 NOTE — MR AVS SNAPSHOT
Visit Information Date & Time Provider Department Dept. Phone Encounter #  
 8/28/2017  1:00 PM Mallika Freeman, [address] [phone] [id number] Your Appointments 10/12/2017  8:20 AM  
ESTABLISHED PATIENT with Corry Klein MD  
[facility] ([address]) [address] 3[address] [facility] [address]  
  
    
 1/8/2018  1:40 PM  
Follow Up with MD Mati Tiwari [facility] [address]) Appt Note: 6 month follow up KRU 6/29  
 [address] [facility] 207 [facility] [address]  
[phone]  
  
   
 [address] [place] [address] [address]  
  
    
 7/30/2018 12:30 PM  
Medicare Physical with Aamir Monterroso MD  
[facility] [address] [facility]) Appt Note: [facility]   
 [facility] [address] [facility] [address]  
[address] [facility] [address] Upcoming Health Maintenance Date Due  
 GLAUCOMA SCREENING Q2Y 5/8/1996 INFLUENZA AGE 9 TO ADULT 8/1/2017 MEDICARE YEARLY EXAM 6/27/2018 DTaP/Tdap/Td series (2 - Td) 6/26/2027 Allergies as of 8/28/2017  Review Complete On: 8/28/2017 By: Estefani Garza Severity Noted Reaction Type Reactions Avapro [Irbesartan]  01/21/2011   Side Effect Other (comments) Hypotension and dizziness Crestor [Rosuvastatin]  01/21/2011   Side Effect Itching, Myalgia Pcn [Penicillins]  01/21/2011   Side Effect Rash Other reaction(s): hives Plaquenil [Hydroxychloroquine]  11/15/2016    Rash Quinine  01/21/2011   Side Effect Other (comments) Other reaction(s): other/intolerance \"ringing in my ears\" Ringing in ear Current Immunizations  Reviewed on 7/11/2016 Name Date Influenza High Dose Vaccine PF 11/1/2016 Influenza Vaccine 10/1/2010 Novel Influenza-H1N1-09, All Formulations 10/1/2010 Pneumococcal Polysaccharide (PPSV-23) 5/1/2010 Not reviewed this visit You Were Diagnosed With   
  
 Codes Comments Non-rheumatic mitral regurgitation    -  Primary ICD-10-CM: I34.0 ICD-9-CM: 424.0 Localized edema     ICD-10-CM: R60.0 ICD-9-CM: 782.3 Non-rheumatic tricuspid valve insufficiency     ICD-10-CM: I36.1 ICD-9-CM: 424.2 Essential hypertension     ICD-10-CM: I10 
ICD-9-CM: 401.9 Other chest pain     ICD-10-CM: R07.89 ICD-9-CM: 786.59 Vitals BP Pulse Resp Height(growth percentile) Weight(growth percentile) BMI  
 130/80 (BP 1 Location: Right arm, BP Patient Position: Sitting) 60 16 5' 1\" (1.549 m) 126 lb 9.6 oz (57.4 kg) 23.92 kg/m2 OB Status Smoking Status Hysterectomy Former Smoker Vitals History BMI and BSA Data Body Mass Index Body Surface Area  
 23.92 kg/m 2 1.57 m 2 Preferred Pharmacy Pharmacy Name Phone [address] Fuentes, [address] [facility]. [phone] Your Updated Medication List  
  
   
This list is accurate as of: 8/28/17  1:53 PM.  Always use your most recent med list.  
  
  
  
  
 acetaminophen 325 mg tablet Commonly known as:  TYLENOL Take 325 mg by mouth as needed for Pain. aspirin delayed-release 81 mg tablet Take 81 mg by mouth daily. citalopram 10 mg tablet Commonly known as:  Tildon Islam Take 1 Tab by mouth daily. furosemide 40 mg tablet Commonly known as:  LASIX Take 1 Tab by mouth daily. KLOR-CON M10 10 mEq tablet Generic drug:  potassium chloride TAKE ONE TABLET BY MOUTH DAILY  
  
 metoprolol tartrate 50 mg tablet Commonly known as:  LOPRESSOR Take 1 Tab by mouth two (2) times a day. MIRALAX 17 gram packet Generic drug:  polyethylene glycol Take 17 g by mouth daily. multivitamin tablet Commonly known as:  ONE A DAY Take 1 Tab by mouth daily. pantoprazole 40 mg tablet Commonly known as:  PROTONIX Take 1 Tab by mouth daily. pravastatin 10 mg tablet Commonly known as:  PRAVACHOL Take 1 Tab by mouth daily. raNITIdine 150 mg tablet Commonly known as:  ZANTAC Take 1 Tab by mouth daily. VITAMIN D3 1,000 unit Cap Generic drug:  cholecalciferol Take  by mouth daily. Prescriptions Sent to Pharmacy Refills  
 furosemide (LASIX) 40 mg tablet 1 Sig: Take 1 Tab by mouth daily. Class: Normal  
 Pharmacy: Christopher [address], [address] [place] [address].  #: [phone] Route: Oral  
  
We Performed the Following MAGNESIUM [id number] CPT(R)] METABOLIC PANEL, BASIC [71389 CPT(R)] Patient Instructions Please increase your Lasix/furosemide to 40mg daily (you can take two 20mg tablets daily until you run out of your current prescription) Once you  the new prescription you will take one tablet daily Please have labs drawn prior to your follow up with Dr. Edna Solis in 6 weeks Please discuss your chest pain and coughing with eating and recent chest xray/possible infection with Dr. Ad Rodrigues Introducing [facility] & HEALTH SERVICES! [employer] introduces [employer] patient portal. Now you can access parts of your medical record, email your doctor's office, and request medication refills online. 1. In your internet browser, go to [email or web address] 2. Click on the First Time User? Click Here link in the Sign In box. You will see the New Member Sign Up page. 3. Enter your [employer] Shield Access Code exactly as it appears below. You will not need to use this code after youve completed the sign-up process. If you do not sign up before the expiration date, you must request a new code. · [employer] Shield Access Code: [email or web address] Expires: 9/16/2017  3:47 PM 
 
 4. Enter the last four digits of your Social Security Number (xxxx) and Date of Birth (mm/dd/yyyy) as indicated and click Submit. You will be taken to the next sign-up page. 5. Create a [employer] ID. This will be your [employer] login ID and cannot be changed, so think of one that is secure and easy to remember. 6. Create a [employer] password. You can change your password at any time. 7. Enter your Password Reset Question and Answer. This can be used at a later time if you forget your password. 8. Enter your e-mail address. You will receive e-mail notification when new information is available in [address] 9. Click Sign Up. You can now view and download portions of your medical record. 10. Click the Download Summary menu link to download a portable copy of your medical information. If you have questions, please visit the Frequently Asked Questions section of the [employer] website. Remember, [employer] is NOT to be used for urgent needs. For medical emergencies, dial 911. Now available from your iPhone and Android! Please provide this summary of care documentation to your next provider. Your primary care clinician is listed as Max Blood. If you have any questions after today's visit, please call [phone].

## 2017-08-28 NOTE — PROGRESS NOTES
[facility]  [phone]    HPI: Grecia Vee, a 80y.o. year-old who presents for follow up regarding her chest pain and LE edema.     She was last seen by Dr. Nelson Adams on 8/1/17 and cardiac testing ordered  Reviewed testing completed since his last, no ischemia on nuclear stress, no DVT on venous duplex  Reviewed TTE results which included severe MR and mild to mod TR but normal LVEF   Discussed the pathophysiology of mitral regurgitation and discussed options for treatment  Told her I would discuss her MR with Dr. Nelson Adams to see if she needed to be referred to the Valve Clinic to review options  She has stable LE edema, not much change in edema since starting lasix, she is wearing her compression stockings daily   She continues to have chest pain at night and during the day, wakes up with it sometimes  Chest pain is not exertional, occurs intermittently, usually located in the mid-sternal and epigastric area  Discomfort can last for a few minutes, sometimes she starts coughing and feels like she is going to lose her breath  She also mentions that she starts to cough when she eats and when she lies down at night  Says she had a swallowing test and describes a conversation with her PCP about stretching her esophagus  I suspect she probably has esophageal stricture based on this conversation and strongly encouraged her to follow up with her PCP  Discussed how stress test was negative and chest pain could be GI related  She has been taking Protonix since her last visit without improvement in her chest pain  She went to [facility] on Saturday for dyspnea   Says they did CXR and she was discharged, then they called her yesterday and said there was something in the right lower lobe  She and her daughter are unsure of what exactly it is, they used the term mass but then say she was prescribed an antibiotic  They say they were told it probably wasn't pneumonia, \"maybe a touch of bronchitis\"  She has not started antibiotic because she wanted out opinion, told her to make sure PCP got records from [facility] and then to follow up with her, encouraged her to start antibiotic if it was prescribed  Blood pressure has been ok, tolerating Metoprolol well, no change in energy level  Denies any PND or orthopnea, has coughing when she lies down  No palpitations  She is not very active, walks with a cane    Assessment/Plan:  1. Chest pain - no ischemia noted on stress test, on Protonix 40mg daily since 8/1/17, may be related to abnormalities with esophagus/GERD, advised her to discuss symptoms with PCP  2. CAD - s/p PCI to mid RCA in 2007, patent stent in mid RCA and non-obstructive CAD noted on cardiac cath in 2012, no ischemia noted on stress test, continue ASA 81mg daily, Metoprolol 50mg BID and pravastatin   3. Dyslipidemia - recently started on pravastatin, had rash/hives with Crestor in the past, will checking fasting labs in 2-3 months  4. LE edema - possibly related to severe MR, will increase lasix to 40mg daily, continue wearing compression stockings, continue sodium restriction, no DVT on venous duplex  4. HTN- well controlled on metoprolol, had dizziness and hypotension with losartan in the past  5. Hx of SLE and Sjogren's - managed by Dr. Bharti Chowdhury  6. Non-Hodgkin's Lymphoma 2014 - had chemo and radiation  7. Dementia - on aricept, managed by neurology   8.   Severe MR, mild to mod TR - will increase Lasix to 40mg daily for fluid retention and check BMP and magnesium level in 6 weeks prior to her return appointment with Dr. Aden Torres, will discuss referral to Valve Clinic with Dr. Aden Torres    Echo 8/17 - LVEF 55 % to 60 %, no WMA, wall thickness was moderately increased, severely dilated LA (LAESV Index ( A-L ): 50  Ml/m2), atrial septum bows from left to right, consistent with increased left atrial pressure, no shunt/hemodynamic flow abnormality seen, mild prolapse of mitral valve anterior leaflet and severe MR, AoV sclerosis, mild to mod TR, mod PAHTN, mild PI  Nuc Stress 8/17 - no ischemia  Venous duplex 8/17 - no DVT  Cardiac Cath 9/2012 - mid LAD with 30-40% lesion at D2, 70% ostial D2 lesion unchanged, MLI in LCX, dominant RCA with MLI and mid RCA stent with minimal ISR, LVEF 65%  Cardiac Cath 2007 - received 3.0 x 12 mm Taxus Monorail stent to mid RCA    Fam Hx: mother with aortic aneurysm at age 80, father passed from cancer, brother passed from [employer] [facility] in his 76s  Soc Hx: quit tobacco > 30 years ago, used to smoke 1/2 ppd, drinks a rare glass of wine, no etoh use     She  has a past medical history of CAD (coronary artery disease); Chronic airway obstruction, not elsewhere classified; Closed left hip fracture ([address] Decubitus ulcer of sacral region, stage 3 ([address] (8/18/2016); Dyspnea on exertion; Echocardiogram (12/02/2010); Essential hypertension, benign; GERD (gastroesophageal reflux disease); Headache; History of myocardial perfusion scan (09/14/2012); Hypercholesterolemia; Lower extremity venous duplex (05/08/2013); Lupus erythematosus (1992); Lymphoma, non-Hodgkin's ([address] (5/2011); Pneumothorax (1981); S/P cardiac cath (09/24/2012); Short-term memory loss; Sjogren's syndrome ([address] (1992); and Traumatic subdural hematoma ([address] (2003). Cardiovascular ROS: positive for chest pain and edema  Respiratory ROS: positive for cough  Neurological ROS: no TIA or stroke symptoms  All other systems negative except as above. PE  Vitals:    08/28/17 1308   BP: 130/80   Pulse: 60   Resp: 16   Weight: 126 lb 9.6 oz (57.4 kg)   Height: 5' 1\" (1.549 m)    Body mass index is 23.92 kg/(m^2).    General appearance - alert, well appearing, and in no distress  Mental status - affect appropriate to mood  Eyes - sclera anicteric, moist mucous membranes  Neck - supple  Lymphatics - not assessed  Chest - clear to auscultation, no wheezes, rales or rhonchi  Heart - normal rate, regular rhythm, normal S1, S2, 2/6 ROSA at LUSB   Abdomen - soft, nontender, nondistended, no masses or organomegaly  Back exam - full range of motion, no tenderness  Neurological - cranial nerves II through XII grossly intact, no focal deficit  Musculoskeletal - no muscular tenderness noted, normal strength  Extremities - peripheral pulses normal, 1+ LE edema bilaterally  Skin - normal coloration  no rashes    Recent Labs:  Lab Results   Component Value Date/Time    Cholesterol, total 176 06/08/2015 12:00 AM    HDL Cholesterol 82 06/08/2015 12:00 AM    LDL, calculated 82 06/08/2015 12:00 AM    Triglyceride 60 06/08/2015 12:00 AM    CHOL/HDL Ratio 2.2 05/03/2011 09:05 AM     Lab Results   Component Value Date/Time    Creatinine 1.20 06/26/2017 12:15 PM     Lab Results   Component Value Date/Time    BUN 9 06/26/2017 12:15 PM     Lab Results   Component Value Date/Time    Potassium 3.9 06/26/2017 12:15 PM     No results found for: HBA1C, HGBE8, EGD1IYIR, PVT6OLVQ  Lab Results   Component Value Date/Time    HGB 12.0 04/27/2017 06:44 PM     Lab Results   Component Value Date/Time    PLATELET 168 30/84/3178 06:44 PM       Reviewed:  Past Medical History:   Diagnosis Date    CAD (coronary artery disease)     Chronic    Chronic airway obstruction, not elsewhere classified     Frequent cough, wheezing secondary to lupus and COPD    Closed left hip fracture ([address]     Decubitus ulcer of sacral region, stage 3 ([address] 8/18/2016    Dyspnea on exertion     Echocardiogram 12/02/2010    EF 60-65%. Gr 1 DDfx. Mild LVH. Mild MR.    Essential hypertension, benign     GERD (gastroesophageal reflux disease)     Headache     History of myocardial perfusion scan 09/14/2012    No evidence of ischemia or infarction. Very mild apical thinning. EF 77%. No WMA. TID 1.46, suggests 3-vessel CAD. Intermediate to high risk pharm stress test due to TID.       Hypercholesterolemia     Lower extremity venous duplex 05/08/2013    Left leg:  No venous thrombosis or venous insufficiency.  Lupus erythematosus 1992    Lymphoma, non-Hodgkin's ([facility] 5/2011    Low-grade being followed by Dr. Janice Moreno without therapy currently    Pneumothorax 1981    Blebs in right lung with recurrent Pneumothorax    S/P cardiac cath 09/24/2012    Hvy calcification of coronary arteries. LM minimal.  mLAD 35%. oD2 70% (unchanged). LCX mild. pRCA mild. Minimal ISR of prev stent. LVEDP 14-16. EF 65%. No WMA. Likely a falsely abnormal stress test.    Short-term memory loss     From fall in 2003    Sjogren's syndrome ([facility] 1992    report of ROGELIO, SSA, RF positive    Traumatic subdural hematoma ([facility] 2003    Head injury with subdural - s/p fall     History   Smoking Status    Former Smoker    Quit date: 1/1/1981   Smokeless Tobacco    Never Used     History   Alcohol Use    Yes     Comment: once a month drinks wine     Allergies   Allergen Reactions    Avapro [Irbesartan] Other (comments)     Hypotension and dizziness    Crestor [Rosuvastatin] Itching and Myalgia    Pcn [Penicillins] Rash     Other reaction(s): hives    Plaquenil [Hydroxychloroquine] Rash    Quinine Other (comments)     Other reaction(s): other/intolerance  \"ringing in my ears\"  Ringing in ear       Current Outpatient Prescriptions   Medication Sig    acetaminophen (TYLENOL) 325 mg tablet Take 325 mg by mouth as needed for Pain.  metoprolol tartrate (LOPRESSOR) 50 mg tablet Take 1 Tab by mouth two (2) times a day.  pravastatin (PRAVACHOL) 10 mg tablet Take 1 Tab by mouth daily.  pantoprazole (PROTONIX) 40 mg tablet Take 1 Tab by mouth daily.  citalopram (CELEXA) 10 mg tablet Take 1 Tab by mouth daily.  KLOR-CON M10 10 mEq tablet TAKE ONE TABLET BY MOUTH DAILY    aspirin delayed-release 81 mg tablet Take 81 mg by mouth daily.  furosemide (LASIX) 20 mg tablet Take 1 Tab by mouth daily.  cholecalciferol (VITAMIN D3) 1,000 unit cap Take  by mouth daily.     multivitamin (ONE A DAY) tablet Take 1 Tab by mouth daily.  polyethylene glycol (MIRALAX) 17 gram packet Take 17 g by mouth daily.  ranitidine (ZANTAC) 150 mg tablet Take 1 Tab by mouth daily. No current facility-administered medications for this visit.         Maritza Wren NP  [facility] [address] [place] [address] [address], [address] [address]  [facility], [facility]  [phone]

## 2017-08-29 ENCOUNTER — OFFICE VISIT (OUTPATIENT)
Dept: INTERNAL MEDICINE CLINIC | Age: 82
End: 2017-08-29

## 2017-08-29 VITALS
BODY MASS INDEX: 23.75 KG/M2 | HEIGHT: 61 IN | DIASTOLIC BLOOD PRESSURE: 90 MMHG | OXYGEN SATURATION: 95 % | TEMPERATURE: 98.2 F | SYSTOLIC BLOOD PRESSURE: 152 MMHG | RESPIRATION RATE: 12 BRPM | WEIGHT: 125.8 LBS | HEART RATE: 67 BPM

## 2017-08-29 DIAGNOSIS — R05.9 COUGH: ICD-10-CM

## 2017-08-29 DIAGNOSIS — I34.0 NON-RHEUMATIC MITRAL REGURGITATION: Primary | ICD-10-CM

## 2017-08-29 DIAGNOSIS — R41.3 MEMORY IMPAIRMENT: ICD-10-CM

## 2017-08-29 DIAGNOSIS — I10 ESSENTIAL HYPERTENSION: ICD-10-CM

## 2017-08-29 NOTE — PROGRESS NOTES
Pt being seen for a follow up from the cardiologist. [facility] where [facility] was taken and was told that a mass was shown on the right side of her lung. Visit Vitals    /90    Pulse 67    Temp 98.2 °F (36.8 °C) (Oral)    Resp 12    Ht 5' 1\" (1.549 m)    Wt 125 lb 12.8 oz (57.1 kg)    SpO2 95%    BMI 23.77 kg/m2     1. Have you been to the ER, urgent care clinic since your last visit? Hospitalized since your last visit? Yes. [facility].  2. Have you seen or consulted any other health care providers outside of the [address] [place] [address] since your last visit? Include any pap smears or colon screening.  No

## 2017-08-29 NOTE — PROGRESS NOTES
Written by Adriane Bowman, as dictated by Dr. Ivan Barber MD.    Carolyn Clovin is a 80 y.o. female. HPI  The patient comes in today for a follow-up. She presents with her daughter today who is helping with her history. She followed with cardiology yesterday, who said she had non-rheumatic mitral valve regurgitation, which was causing her leg swelling. An ECHO on 08/14 showed this as well. She went to [facility] for cough causing SOB, and a chest XR showed bronchitis and possible infiltrates. She was given doxycycline,. She has not had a fever. She has chronic cough due to esophageal dysfunction for which she  went through speech therapy in the past, but d/c'ed. She does not want to see a gastroenterologist. She is frustrated with her care and feels like she does not fully understand her diagnoses. She is also upset because she feels like she is in pain all the time. She does not seem to be happy living with her daughter and would like to move somewhere else. She does not like to take a lot of  Medication. Her short term memory is getting worse & as per her daughter she has been getting confused easily.     Patient Active Problem List   Diagnosis Code    Lupus erythematosus L93.0    Sjogren's syndrome ([address] M35.00    Chronic airway obstruction ([address] J44.9    Hypertensive heart disease I11.9    Coronary atherosclerosis of native coronary artery I25.10    Lymphoma, non-Hodgkin's (HCC) C85.90    Hypercholesterolemia E78.00    Essential hypertension, benign I10    Left displaced femoral neck fracture ([facility]) S72.002A    Diffuse large B-cell lymphoma of lymph nodes of multiple regions ([facility]) C83.38    Pulmonary fibrosis (HCC) J84.10    Status post angioplasty with stent Z95.9    Mixed hyperlipidemia E78.2    Lupus (systemic lupus erythematosus) (HCC) M32.9    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 90 Tab 1    metoprolol tartrate (LOPRESSOR) 50 mg tablet Take 1 Tab by mouth two (2) times a day. 180 Tab 3    pravastatin (PRAVACHOL) 10 mg tablet Take 1 Tab by mouth daily. 90 Tab 3    pantoprazole (PROTONIX) 40 mg tablet Take 1 Tab by mouth daily. 90 Tab 1    citalopram (CELEXA) 10 mg tablet Take 1 Tab by mouth daily. 90 Tab 1    KLOR-CON M10 10 mEq tablet TAKE ONE TABLET BY MOUTH DAILY 30 Tab 6    aspirin delayed-release 81 mg tablet Take 81 mg by mouth daily.  cholecalciferol (VITAMIN D3) 1,000 unit cap Take  by mouth daily.  ranitidine (ZANTAC) 150 mg tablet Take 1 Tab by mouth daily. 30 Tab 0    acetaminophen (TYLENOL) 325 mg tablet Take 325 mg by mouth as needed for Pain.  multivitamin (ONE A DAY) tablet Take 1 Tab by mouth daily.  polyethylene glycol (MIRALAX) 17 gram packet Take 17 g by mouth daily. No current facility-administered medications on file prior to visit. Allergies   Allergen Reactions    Avapro [Irbesartan] Other (comments)     Hypotension and dizziness    Crestor [Rosuvastatin] Itching and Myalgia    Pcn [Penicillins] Rash     Other reaction(s): hives    Plaquenil [Hydroxychloroquine] Rash    Quinine Other (comments)     Other reaction(s): other/intolerance  \"ringing in my ears\"  Ringing in ear       Past Medical History:   Diagnosis Date    CAD (coronary artery disease)     Chronic    Chronic airway obstruction, not elsewhere classified     Frequent cough, wheezing secondary to lupus and COPD    Closed left hip fracture (HCC)     Decubitus ulcer of sacral region, stage 3 ([facility] 8/18/2016    Dyspnea on exertion     Echocardiogram 12/02/2010    EF 60-65%. Gr 1 DDfx. Mild LVH.   Mild MR.    Essential hypertension, benign     GERD (gastroesophageal reflux disease)     Headache     History of myocardial perfusion scan 09/14/2012    No evidence of ischemia or infarction. Very mild apical thinning. EF 77%. No WMA. TID 1.46, suggests 3-vessel CAD. Intermediate to high risk pharm stress test due to TID.  Hypercholesterolemia     Lower extremity venous duplex 05/08/2013    Left leg:  No venous thrombosis or venous insufficiency.  Lupus erythematosus 1992    Lymphoma, non-Hodgkin's ([facility] 5/2011    Low-grade being followed by Dr. Brittany Cantu without therapy currently    Pneumothorax 1981    Blebs in right lung with recurrent Pneumothorax    S/P cardiac cath 09/24/2012    Hvy calcification of coronary arteries. LM minimal.  mLAD 35%. oD2 70% (unchanged). LCX mild. pRCA mild. Minimal ISR of prev stent. LVEDP 14-16. EF 65%. No WMA. Likely a falsely abnormal stress test.    Short-term memory loss     From fall in 2003    Sjogren's syndrome ([facility] 1992    report of ROGELIO, SSA, RF positive    Traumatic subdural hematoma ([facility] 2003    Head injury with subdural - s/p fall       Past Surgical History:   Procedure Laterality Date    CARDIAC SURG PROCEDURE UNLIST      cardiac stent    CHEST SURGERY PROCEDURE UNLISTED  1981    RUL removal    HX ADENOIDECTOMY  11/2013    eyelids lifted    HX CATARACT REMOVAL Bilateral     w/ lens implants    HX CORONARY STENT PLACEMENT  8/21/2007    HX HEART CATHETERIZATION  9/24/2012    HX HEART CATHETERIZATION  8/21/2007    [facility] Iris [facility] - stent placed    HX HEENT  2003    subdural hematoma removed s/p fall    HX HYSTERECTOMY  1974    HX LOBECTOMY Right     upper portion    HX PNEUMONECTOMY      partial    NEUROLOGICAL PROCEDURE UNLISTED      subdural hematoma       Family History   Problem Relation Age of Onset    Cancer Father     Cancer Brother        Social History     Social History    Marital status:      Spouse name: N/A    Number of children: N/A    Years of education: N/A     Occupational History    Not on file.      Social History Main Topics    Smoking status: Former Smoker     Quit date: 1/1/1981    Smokeless tobacco: Never Used    Alcohol use Yes      Comment: once a month drinks wine    Drug use: No    Sexual activity: Not Currently     Other Topics Concern    Not on file     Social History Narrative    ** Merged History Encounter **            Office Visit on 06/26/2017   Component Date Value Ref Range Status    Glucose 06/26/2017 89  65 - 99 mg/dL Final    BUN 06/26/2017 9  8 - 27 mg/dL Final    Creatinine 06/26/2017 1.20* 0.57 - 1.00 mg/dL Final    GFR est non-AA 06/26/2017 41* >59 mL/min/1.73 Final    GFR est AA 06/26/2017 47* >59 mL/min/1.73 Final    BUN/Creatinine ratio 06/26/2017 8* 12 - 28 Final    Sodium 06/26/2017 140  134 - 144 mmol/L Final    Potassium 06/26/2017 3.9  3.5 - 5.2 mmol/L Final    Chloride 06/26/2017 101  96 - 106 mmol/L Final    CO2 06/26/2017 28  18 - 29 mmol/L Final    Calcium 06/26/2017 10.3  8.7 - 10.3 mg/dL Final    Protein, total 06/26/2017 6.5  6.0 - 8.5 g/dL Final    Albumin 06/26/2017 3.9  3.5 - 4.7 g/dL Final    GLOBULIN, TOTAL 06/26/2017 2.6  1.5 - 4.5 g/dL Final    A-G Ratio 06/26/2017 1.5  1.2 - 2.2 Final    Bilirubin, total 06/26/2017 0.4  0.0 - 1.2 mg/dL Final    Alk. phosphatase 06/26/2017 53  39 - 117 IU/L Final    AST (SGOT) 06/26/2017 16  0 - 40 IU/L Final    ALT (SGPT) 06/26/2017 8  0 - 32 IU/L Final    Interpretation 06/26/2017 Note   Final    Supplement report is available. Review of Systems   Constitutional: Negative for malaise/fatigue. HENT: Negative for congestion. Respiratory: Positive for cough. Negative for shortness of breath. Cardiovascular: Positive for leg swelling. Negative for chest pain and palpitations. Musculoskeletal: Negative for joint pain and myalgias. Neurological: Negative for weakness. Psychiatric/Behavioral: Positive for memory loss. Negative for depression and substance abuse.      Visit Vitals    /90    Pulse 67    Temp 98.2 °F (36.8 °C) (Oral)    Resp 12    Ht 5' 1\" (1.549 m)    Wt 125 lb 12.8 oz (57.1 kg)    SpO2 95%    BMI 23.77 kg/m2       Physical Exam   Constitutional: She is oriented to person, place, and time. She appears well-developed and well-nourished. No distress. HENT:   Right Ear: External ear normal.   Left Ear: External ear normal.   Eyes: Conjunctivae and EOM are normal. Right eye exhibits no discharge. Left eye exhibits no discharge. Neck: Normal range of motion. Neck supple. Cardiovascular: Normal rate and regular rhythm. Pulmonary/Chest: Effort normal and breath sounds normal. She has no wheezes. Abdominal: Soft. Bowel sounds are normal. There is no tenderness. Lymphadenopathy:     She has no cervical adenopathy. Neurological: She is alert and oriented to person, place, and time. Skin: She is not diaphoretic. Psychiatric: She has a normal mood and affect. Her behavior is normal.   Nursing note and vitals reviewed. ASSESSMENT and PLAN    ICD-10-CM ICD-9-CM    1. Non-rheumatic mitral regurgitation I34.0 424.0 Pt follows with cardiology. 2. Cough R05 786.2 Most likely due to esophageal disorder. Recommended seeing a speech pathologist. She was started on doxycycline by [facility] but CXR was negative for pneumonia. 3. Essential hypertension I10 401.9 BP is elevated in office today because pt is upset. 4. Memory impairment R41.3 780.93 As per her daughter she is becoming agitated and forgetful, mixing up things. This plan was reviewed with the patient and patient agrees. All questions were answered. This scribe documentation was reviewed by me and accurately reflects the examination and decisions made by me. This note will not be viewable in [address]

## 2017-08-29 NOTE — MR AVS SNAPSHOT
Visit Information Date & Time Provider Department Dept. Phone Encounter #  
 8/29/2017  2:00 PM Alison Rascon MD [facility] Internal Medicine [phone] [id number] Your Appointments 10/12/2017  8:20 AM  
ESTABLISHED PATIENT with Dieudonne Mike MD  
[facility] ([facility]) [address]  [address] [place] [address]  
[address] [facility] [address]  
  
    
 1/8/2018  1:40 PM  
Follow Up with Gladis Thomas MD  
[facility] Appt Note: 6 month follow up KRU 6/29  
 [address] [address]  
[phone]  
  
   
 [address] [facility] [address]  
  
    
 7/30/2018 12:30 PM  
Medicare Physical with Alison Rascon MD  
[facility] Internal Medicine [facility] Appt Note: [facility]   
 [facility] [address] [facility] [address]  
[address] [facility] [address] [facility] [place] [address] Upcoming Health Maintenance Date Due  
 GLAUCOMA SCREENING Q2Y 5/8/1996 INFLUENZA AGE 9 TO ADULT 8/1/2017 MEDICARE YEARLY EXAM 6/27/2018 DTaP/Tdap/Td series (2 - Td) 6/26/2027 Allergies as of 8/29/2017  Review Complete On: 8/28/2017 By: Arnol Caceres Severity Noted Reaction Type Reactions Avapro [Irbesartan]  01/21/2011   Side Effect Other (comments) Hypotension and dizziness Crestor [Rosuvastatin]  01/21/2011   Side Effect Itching, Myalgia Pcn [Penicillins]  01/21/2011   Side Effect Rash Other reaction(s): hives Plaquenil [Hydroxychloroquine]  11/15/2016    Rash Quinine  01/21/2011   Side Effect Other (comments) Other reaction(s): other/intolerance \"ringing in my ears\" Ringing in ear Current Immunizations  Reviewed on 7/11/2016 Name Date Influenza High Dose Vaccine PF 11/1/2016 Influenza Vaccine 10/1/2010 Novel Influenza-H1N1-09, All Formulations 10/1/2010 Pneumococcal Polysaccharide (PPSV-23) 5/1/2010 Not reviewed this visit You Were Diagnosed With   
  
 Codes Comments Non-rheumatic mitral regurgitation    -  Primary ICD-10-CM: I34.0 ICD-9-CM: 424.0 Cough     ICD-10-CM: R05 ICD-9-CM: 786.2 Essential hypertension     ICD-10-CM: I10 
ICD-9-CM: 401.9 Memory impairment     ICD-10-CM: R41.3 ICD-9-CM: 780.93 Vitals BP Pulse Temp Resp Height(growth percentile) Weight(growth percentile) 152/90 67 98.2 °F (36.8 °C) (Oral) 12 5' 1\" (1.549 m) 125 lb 12.8 oz (57.1 kg) SpO2 BMI OB Status Smoking Status 95% 23.77 kg/m2 Hysterectomy Former Smoker Vitals History BMI and BSA Data Body Mass Index Body Surface Area  
 23.77 kg/m 2 1.57 m 2 Preferred Pharmacy Pharmacy Name Phone [facility] [place] [address]. [phone] Your Updated Medication List  
  
   
This list is accurate as of: 8/29/17  3:23 PM.  Always use your most recent med list.  
  
  
  
  
 acetaminophen 325 mg tablet Commonly known as:  TYLENOL Take 325 mg by mouth as needed for Pain. aspirin delayed-release 81 mg tablet Take 81 mg by mouth daily. citalopram 10 mg tablet Commonly known as:  Drena Kelsey Take 1 Tab by mouth daily. furosemide 40 mg tablet Commonly known as:  LASIX Take 1 Tab by mouth daily. KLOR-CON M10 10 mEq tablet Generic drug:  potassium chloride TAKE ONE TABLET BY MOUTH DAILY  
  
 metoprolol tartrate 50 mg tablet Commonly known as:  LOPRESSOR Take 1 Tab by mouth two (2) times a day. MIRALAX 17 gram packet Generic drug:  polyethylene glycol Take 17 g by mouth daily. multivitamin tablet Commonly known as:  ONE A DAY Take 1 Tab by mouth daily. pantoprazole 40 mg tablet Commonly known as:  PROTONIX Take 1 Tab by mouth daily. pravastatin 10 mg tablet Commonly known as:  PRAVACHOL Take 1 Tab by mouth daily. raNITIdine 150 mg tablet Commonly known as:  ZANTAC Take 1 Tab by mouth daily. VITAMIN D3 1,000 unit Cap Generic drug:  cholecalciferol Take  by mouth daily. Introducing hospitals & HEALTH SERVICES! [facility] introduces [employer]ark patient portal. Now you can access parts of your medical record, email your doctor's office, and request medication refills online. 1. In your internet browser, go to [email or web address] 2. Click on the First Time User? Click Here link in the Sign In box. You will see the New Member Sign Up page. 3. Enter your Olark Access Code exactly as it appears below. You will not need to use this code after youve completed the sign-up process. If you do not sign up before the expiration date, you must request a new code. · Olark Access Code: [email or web address] Expires: 9/16/2017  3:47 PM 
 
4. Enter the last four digits of your Social Security Number (xxxx) and Date of Birth (mm/dd/yyyy) as indicated and click Submit. You will be taken to the next sign-up page. 5. Create a Olark ID. This will be your Olark login ID and cannot be changed, so think of one that is secure and easy to remember. 6. Create a Olark password. You can change your password at any time. 7. Enter your Password Reset Question and Answer. This can be used at a later time if you forget your password. 8. Enter your e-mail address. You will receive e-mail notification when new information is available in [address] 9. Click Sign Up. You can now view and download portions of your medical record. 10. Click the Download Summary menu link to download a portable copy of your medical information. If you have questions, please visit the Frequently Asked Questions section of the Olark website. Remember, Olark is NOT to be used for urgent needs. For medical emergencies, dial 911. Now available from your iPhone and Android! Please provide this summary of care documentation to your next provider. Your primary care clinician is listed as Sukhjinder Berumen. If you have any questions after today's visit, please call [phone].

## 2017-09-27 ENCOUNTER — HOSPITAL ENCOUNTER (EMERGENCY)
Age: 82
Discharge: HOME | End: 2017-09-27
Attending: EMERGENCY MEDICINE
Payer: COMMERCIAL

## 2017-09-27 VITALS
BODY MASS INDEX: 23.6 KG/M2 | HEIGHT: 61 IN | RESPIRATION RATE: 16 BRPM | SYSTOLIC BLOOD PRESSURE: 163 MMHG | OXYGEN SATURATION: 100 % | TEMPERATURE: 98.4 F | DIASTOLIC BLOOD PRESSURE: 85 MMHG | HEART RATE: 87 BPM | WEIGHT: 125 LBS
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
## 2017-09-27 PROCEDURE — 99283 EMERGENCY DEPT VISIT LOW MDM: CPT
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
## 2017-09-28 ENCOUNTER — PATIENT OUTREACH (OUTPATIENT)
Dept: INTERNAL MEDICINE CLINIC | Age: 82
End: 2017-09-28
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
## 2017-10-02 ENCOUNTER — HOSPITAL ENCOUNTER (OUTPATIENT)
Dept: GENERAL RADIOLOGY | Age: 82
Discharge: HOME | End: 2017-10-02

## 2017-10-02 ENCOUNTER — OFFICE VISIT (OUTPATIENT)
Dept: INTERNAL MEDICINE CLINIC | Age: 82
End: 2017-10-02

## 2017-10-02 VITALS
HEART RATE: 66 BPM | RESPIRATION RATE: 14 BRPM | TEMPERATURE: 97.6 F | DIASTOLIC BLOOD PRESSURE: 98 MMHG | SYSTOLIC BLOOD PRESSURE: 148 MMHG | WEIGHT: 125 LBS | OXYGEN SATURATION: 99 % | BODY MASS INDEX: 23.6 KG/M2 | HEIGHT: 61 IN
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
## 2017-10-02 DIAGNOSIS — Z95.820 S/P ANGIOPLASTY WITH STENT: ICD-10-CM

## 2017-10-02 DIAGNOSIS — M54.50 ACUTE MIDLINE LOW BACK PAIN WITHOUT SCIATICA: Primary | ICD-10-CM

## 2017-10-02 DIAGNOSIS — M54.50 ACUTE MIDLINE LOW BACK PAIN WITHOUT SCIATICA: ICD-10-CM

## 2017-10-02 DIAGNOSIS — I10 ESSENTIAL HYPERTENSION: ICD-10-CM

## 2017-10-02 PROCEDURE — 72100 X-RAY EXAM L-S SPINE 2/3 VWS: CPT

## 2017-10-02 RX ORDER — ISOSORBIDE DINITRATE 5 MG/1
5 TABLET ORAL 3 TIMES DAILY
Qty: 90 TAB | Refills: 0 | Status: SHIPPED | OUTPATIENT
Start: 2017-10-02 | End: 2017-11-01
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
## 2017-10-06 DIAGNOSIS — M54.50 ACUTE BILATERAL LOW BACK PAIN WITHOUT SCIATICA: ICD-10-CM

## 2017-10-06 DIAGNOSIS — M51.36 DDD (DEGENERATIVE DISC DISEASE), LUMBAR: ICD-10-CM
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
## 2017-10-06 NOTE — PROGRESS NOTES
Spoke with pt dtr who voiced understanding. Stated can order home PT. She voiced understanding. Will order bon [facility] home PT order. Dtr will contact me if no contact by Monday evening.

## 2017-10-12 ENCOUNTER — OFFICE VISIT (OUTPATIENT)
Dept: CARDIOLOGY CLINIC | Age: 82
End: 2017-10-12

## 2017-10-12 VITALS
SYSTOLIC BLOOD PRESSURE: 140 MMHG | BODY MASS INDEX: 23.45 KG/M2 | RESPIRATION RATE: 16 BRPM | DIASTOLIC BLOOD PRESSURE: 80 MMHG | HEART RATE: 72 BPM | HEIGHT: 61 IN | WEIGHT: 124.2 LBS

## 2017-10-12 DIAGNOSIS — I10 ESSENTIAL HYPERTENSION, BENIGN: ICD-10-CM

## 2017-10-12 DIAGNOSIS — I25.10 ATHEROSCLEROSIS OF NATIVE CORONARY ARTERY OF NATIVE HEART WITHOUT ANGINA PECTORIS: ICD-10-CM

## 2017-10-12 DIAGNOSIS — Z95.820 S/P ANGIOPLASTY WITH STENT: ICD-10-CM

## 2017-10-12 DIAGNOSIS — I34.0 NON-RHEUMATIC MITRAL REGURGITATION: Primary | ICD-10-CM

## 2017-10-12 DIAGNOSIS — E78.2 MIXED HYPERLIPIDEMIA: ICD-10-CM

## 2017-10-12 DIAGNOSIS — E78.00 HYPERCHOLESTEROLEMIA: ICD-10-CM

## 2017-10-12 DIAGNOSIS — I11.9 BENIGN HYPERTENSIVE HEART DISEASE WITHOUT HEART FAILURE: ICD-10-CM
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
## 2017-10-12 NOTE — MR AVS SNAPSHOT
Visit Information Date & Time Provider Department Dept. Phone Encounter #  
 10/12/2017  8:20 AM Chris Smith MD [facility] [phone] [id number] Your Appointments 12/12/2017  8:20 AM  
ESTABLISHED PATIENT with Chris Smith MD  
[facility] ([address]) Appt Note: [facility]  [address] [facility]  
[address] [facility] [address]  
  
    
 1/8/2018  1:40 PM  
Follow Up with MD [facility] [address] [place] [address]) Appt Note: 6 month follow up KRU 6/29  
 [address] [address]  
[phone]  
  
   
 [address] [facility] [address]  
  
    
 7/30/2018 12:30 PM  
Medicare Physical with Yo Wilcox MD  
[facility] [address] [facility]) Appt Note: [facility]   
 [facility] [address] [facility] [address]  
[address] [facility] [address] Upcoming Health Maintenance Date Due  
 GLAUCOMA SCREENING Q2Y 5/8/1996 MEDICARE YEARLY EXAM 6/27/2018 DTaP/Tdap/Td series (2 - Td) 6/26/2027 Allergies as of 10/12/2017  Review Complete On: 10/12/2017 By: Lorena Lee Severity Noted Reaction Type Reactions Avapro [Irbesartan]  01/21/2011   Side Effect Other (comments) Hypotension and dizziness Crestor [Rosuvastatin]  01/21/2011   Side Effect Itching, Myalgia Pcn [Penicillins]  01/21/2011   Side Effect Rash Other reaction(s): hives Plaquenil [Hydroxychloroquine]  11/15/2016    Rash Quinine  01/21/2011   Side Effect Other (comments) Other reaction(s): other/intolerance \"ringing in my ears\" Ringing in ear Current Immunizations  Reviewed on 7/11/2016 Name Date Influenza High Dose Vaccine PF 11/1/2016 Influenza Vaccine 10/1/2010 Novel Influenza-H1N1-09, All Formulations 10/1/2010 Pneumococcal Polysaccharide (PPSV-23) 5/1/2010 Not reviewed this visit You Were Diagnosed With   
  
 Codes Comments Non-rheumatic mitral regurgitation    -  Primary ICD-10-CM: I34.0 ICD-9-CM: 424.0 Mixed hyperlipidemia     ICD-10-CM: E78.2 ICD-9-CM: 272.2 Hypercholesterolemia     ICD-10-CM: E78.00 ICD-9-CM: 272.0 Essential hypertension, benign     ICD-10-CM: I10 
ICD-9-CM: 401.1 Benign hypertensive heart disease without heart failure     ICD-10-CM: I11.9 ICD-9-CM: 402.10 Atherosclerosis of native coronary artery of native heart without angina pectoris     ICD-10-CM: I25.10 ICD-9-CM: 414.01   
 S/P angioplasty with stent     ICD-10-CM: Z95.9 ICD-9-CM: V45.89 Vitals BP Pulse Resp Height(growth percentile) Weight(growth percentile) BMI  
 140/80 (BP 1 Location: Right arm, BP Patient Position: Sitting) 72 16 5' 1\" (1.549 m) 124 lb 3.2 oz (56.3 kg) 23.47 kg/m2 OB Status Smoking Status Hysterectomy Former Smoker Vitals History BMI and BSA Data Body Mass Index Body Surface Area  
 23.47 kg/m 2 1.56 m 2 Preferred Pharmacy Pharmacy Name Phone [facility] [address], [address] [place] [address]. [phone] Your Updated Medication List  
  
   
This list is accurate as of: 10/12/17  9:43 AM.  Always use your most recent med list.  
  
  
  
  
 acetaminophen 325 mg tablet Commonly known as:  TYLENOL Take 325 mg by mouth as needed for Pain. aspirin delayed-release 81 mg tablet Take 81 mg by mouth daily. citalopram 10 mg tablet Commonly known as:  Debarah Dieter Take 1 Tab by mouth daily. furosemide 40 mg tablet Commonly known as:  LASIX Take 1 Tab by mouth daily. isosorbide dinitrate 5 mg tablet Commonly known as:  ISORDIL  
 Take 1 Tab by mouth three (3) times daily for 30 days. KLOR-CON M10 10 mEq tablet Generic drug:  potassium chloride TAKE ONE TABLET BY MOUTH DAILY  
  
 metoprolol tartrate 50 mg tablet Commonly known as:  LOPRESSOR Take 1 Tab by mouth two (2) times a day. MIRALAX 17 gram packet Generic drug:  polyethylene glycol Take 17 g by mouth daily. multivitamin tablet Commonly known as:  ONE A DAY Take 1 Tab by mouth daily. pantoprazole 40 mg tablet Commonly known as:  PROTONIX Take 1 Tab by mouth daily. pravastatin 10 mg tablet Commonly known as:  PRAVACHOL Take 1 Tab by mouth daily. raNITIdine 150 mg tablet Commonly known as:  ZANTAC Take 1 Tab by mouth daily. VITAMIN D3 1,000 unit Cap Generic drug:  cholecalciferol Take  by mouth daily. We Performed the Following AMB POC EKG ROUTINE W/ 12 LEADS, INTER & REP [96928 CPT(R)] To-Do List   
 12/28/2017 1:00 PM  
  Appointment with [facility] at [facility] RAD CT ([phone]) CONTRAST STUDY: 1. The patient should not eat solid food four hours before the appointment but should be encouraged to drink clear liquids. 2.  If you have to drink oral contrast, please pick it up any weekday prior to your appointment, if you cannot please check in 2 hrs before appt time. 3.  The patient will require IV access for contrast administration. 4.  The patient should not take Ibuprofen (Advil, Motrin, etc.) and Naproxen Sodium (Aleve, etc.)  on the day of the exam. Stopping non-steroidal anti-inflammatory agents (NSAIDs) like Ibuprofen decreases the risk of kidney damage from the x-ray contrast (dye). 5.  Bring any non [facility] facility films/images pertaining to the area of interest with you on the day of appointment.  6.  Bring current lab work if available (within last 90 days [facility]) ***If scheduled at [facility], iSTAT is not available, labs will need to be done before appointment*** 7. Check in at registration at least 30 minutes before appt time unless you were instructed to do otherwise. Introducing [facility]! [facility] introduces Kodable patient portal. Now you can access parts of your medical record, email your doctor's office, and request medication refills online. 1. In your internet browser, go to [email or web address] 2. Click on the First Time User? Click Here link in the Sign In box. You will see the New Member Sign Up page. 3. Enter your Kodable Access Code exactly as it appears below. You will not need to use this code after youve completed the sign-up process. If you do not sign up before the expiration date, you must request a new code. · Kodable Access Code: [email or web address] Expires: 12/26/2017  7:33 PM 
 
4. Enter the last four digits of your Social Security Number (xxxx) and Date of Birth (mm/dd/yyyy) as indicated and click Submit. You will be taken to the next sign-up page. 5. Create a Kodable ID. This will be your Kodable login ID and cannot be changed, so think of one that is secure and easy to remember. 6. Create a Kodable password. You can change your password at any time. 7. Enter your Password Reset Question and Answer. This can be used at a later time if you forget your password. 8. Enter your e-mail address. You will receive e-mail notification when new information is available in [address] 9. Click Sign Up. You can now view and download portions of your medical record. 10. Click the Download Summary menu link to download a portable copy of your medical information. If you have questions, please visit the Frequently Asked Questions section of the Kodable website. Remember, Kodable is NOT to be used for urgent needs. For medical emergencies, dial 911. Now available from your iPhone and Android! Please provide this summary of care documentation to your next provider. Your primary care clinician is listed as Tamiko Lazaro. If you have any questions after today's visit, please call [phone].

## 2017-11-22 ENCOUNTER — OFFICE VISIT (OUTPATIENT)
Dept: FAMILY MEDICINE CLINIC | Age: 82
End: 2017-11-22

## 2017-11-22 VITALS
TEMPERATURE: 98 F | RESPIRATION RATE: 10 BRPM | BODY MASS INDEX: 24.17 KG/M2 | DIASTOLIC BLOOD PRESSURE: 80 MMHG | SYSTOLIC BLOOD PRESSURE: 144 MMHG | WEIGHT: 128 LBS | OXYGEN SATURATION: 98 % | HEART RATE: 67 BPM | HEIGHT: 61 IN

## 2017-11-22 DIAGNOSIS — E78.2 MIXED HYPERLIPIDEMIA: ICD-10-CM

## 2017-11-22 DIAGNOSIS — J43.9 PULMONARY EMPHYSEMA, UNSPECIFIED EMPHYSEMA TYPE (HCC): ICD-10-CM

## 2017-11-22 DIAGNOSIS — I34.0 NON-RHEUMATIC MITRAL REGURGITATION: ICD-10-CM

## 2017-11-22 DIAGNOSIS — I10 ESSENTIAL HYPERTENSION, BENIGN: ICD-10-CM

## 2017-11-22 DIAGNOSIS — C85.99 NON-HODGKIN LYMPHOMA OF EXTRANODAL SITE EXCLUDING SPLEEN AND OTHER SOLID ORGANS, UNSPECIFIED NON-HODGKIN LYMPHOMA TYPE (HCC): ICD-10-CM

## 2017-11-22 DIAGNOSIS — M35.00 SJOGREN'S SYNDROME, WITH UNSPECIFIED ORGAN INVOLVEMENT (HCC): Primary | ICD-10-CM

## 2017-11-22 RX ORDER — IBUPROFEN 200 MG
400 TABLET ORAL
COMMUNITY
End: 2018-07-18

## 2017-11-22 NOTE — PROGRESS NOTES
Idania Saez is a 80 y.o. female  presents for establish care. Allergies   Allergen Reactions    Avapro [Irbesartan] Other (comments)     Hypotension and dizziness    Crestor [Rosuvastatin] Itching and Myalgia    Pcn [Penicillins] Rash     Other reaction(s): hives    Plaquenil [Hydroxychloroquine] Rash    Quinine Other (comments)     Other reaction(s): other/intolerance  \"ringing in my ears\"  Ringing in ear     Outpatient Prescriptions Marked as Taking for the 11/22/17 encounter (Office Visit) with Champ Farrell MD   Medication Sig Dispense Refill    ibuprofen (MOTRIN) 200 mg tablet Take 400 mg by mouth every six (6) hours as needed for Pain.  furosemide (LASIX) 40 mg tablet Take 1 Tab by mouth daily. 90 Tab 1    acetaminophen (TYLENOL) 325 mg tablet Take 325 mg by mouth as needed for Pain.  metoprolol tartrate (LOPRESSOR) 50 mg tablet Take 1 Tab by mouth two (2) times a day. 180 Tab 3    pravastatin (PRAVACHOL) 10 mg tablet Take 1 Tab by mouth daily. 90 Tab 3    citalopram (CELEXA) 10 mg tablet Take 1 Tab by mouth daily. 90 Tab 1    KLOR-CON M10 10 mEq tablet TAKE ONE TABLET BY MOUTH DAILY 30 Tab 6    aspirin delayed-release 81 mg tablet Take 81 mg by mouth daily.  polyethylene glycol (MIRALAX) 17 gram packet Take 17 g by mouth daily.  ranitidine (ZANTAC) 150 mg tablet Take 1 Tab by mouth daily.  30 Tab 0     Patient Active Problem List   Diagnosis Code    Lupus erythematosus L93.0    [facility]'s [facility] M35.00    Chronic airway obstruction (HCC) J44.9    Hypertensive heart disease I11.9    Coronary atherosclerosis of native coronary artery I25.10    Lymphoma, non-Hodgkin's (HCC) C85.90    Hypercholesterolemia E78.00    Essential hypertension, benign I10    Left displaced femoral neck fracture (HCC) S72.002A    Diffuse large B-cell lymphoma of lymph nodes of multiple regions (HCC) C83.38    Pulmonary fibrosis (HCC) J84.10    Status post angioplasty with stent Z95.9    Mixed hyperlipidemia E78.2    Lupus (systemic lupus erythematosus) ([facility]) M32.9    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 Gr 1 DDfx. Mild LVH. Mild MR.    Essential hypertension, benign     GERD (gastroesophageal reflux disease)     Headache(784.0)     History of myocardial perfusion scan 09/14/2012    No evidence of ischemia or infarction. Very mild apical thinning. EF 77%. No WMA. TID 1.46, suggests 3-vessel CAD. Intermediate to high risk pharm stress test due to TID.  Hypercholesterolemia     Lower extremity venous duplex 05/08/2013    Left leg:  No venous thrombosis or venous insufficiency.  Lupus erythematosus 1992    Lymphoma, non-Hodgkin's ([facility] 5/2011    Low-grade being followed by Dr. Patel Bonilla without therapy currently    Mitral valve prolapse     10/2017    Pneumothorax 1981    Blebs in right lung with recurrent Pneumothorax    S/P cardiac cath 09/24/2012    Hvy calcification of coronary arteries. LM minimal.  mLAD 35%. oD2 70% (unchanged). LCX mild. pRCA mild. Minimal ISR of prev stent. LVEDP 14-16. EF 65%. No WMA.   Likely a falsely abnormal stress test.    Short-term memory loss     From fall in 2003    Sjogren's syndrome ([facility] 1992    report of ROGELIO, SSA, RF positive    Traumatic subdural hematoma ([facility] 2003    Head injury with subdural - s/p fall     Social History     Social History    Marital status:      Spouse name: N/A    Number of children: N/A    Years of education: N/A     Social History Main Topics    Smoking status: Former Smoker     Quit date: 1/1/1981    Smokeless tobacco: Never Used    Alcohol use Yes      Comment: once a month drinks wine    Drug use: No    Sexual activity: Not Currently     Other Topics Concern    None     Social History Narrative    ** Merged History Encounter **          Family History   Problem Relation Age of Onset    Cancer Father     Cancer Brother         Review of Systems   Constitutional: Negative for chills and fever. Cardiovascular: Negative for chest pain and palpitations. Gastrointestinal: Negative for nausea and vomiting. Musculoskeletal: Positive for joint pain. Vitals:    11/22/17 1651   BP: 144/80   Pulse: 67   Resp: 10   Temp: 98 °F (36.7 °C)   TempSrc: Oral   SpO2: 98%   Weight: 128 lb (58.1 kg)   Height: 5' 1\" (1.549 m)   PainSc:   8   PainLoc: Buttocks       Physical Exam   Constitutional: She is oriented to person, place, and time and well-developed, well-nourished, and in no distress. Cardiovascular: Normal rate, regular rhythm and normal heart sounds. Pulmonary/Chest: Effort normal and breath sounds normal.   Neurological: She is alert and oriented to person, place, and time. Skin: Skin is warm and dry. Psychiatric: Mood, memory, affect and judgment normal.   Nursing note and vitals reviewed. Assessment/Plan      ICD-10-CM ICD-9-CM    1. Sjogren's syndrome, with unspecified organ involvement ([facility] M35.00 710.2 REFERRAL TO RHEUMATOLOGY      REFERRAL TO PHYSICAL THERAPY   2. Essential hypertension, benign I10 401.1    3. Mixed hyperlipidemia E78.2 272.2    4. Pulmonary emphysema, unspecified emphysema type ([address] J43.9 492.8    5. Non-Hodgkin lymphoma of extranodal site excluding spleen and other solid organs, unspecified non-Hodgkin lymphoma type (HCC) C85.99 202.80    6.  Non-rheumatic mitral regurgitation I34.0 424.0      I have discussed the diagnosis with the patient and the intended plan of care as seen in the above orders. The patient has received an after-visit summary and questions were answered concerning future plans. I have discussed medication, side effects, and warnings with the patient in detail. The patient verbalized understanding and is in agreement with the plan of care. The patient will contact the office with any additional concerns. Follow-up Disposition:  Return in about 3 months (around 2/22/2018).   lab results and schedule of future lab studies reviewed with patient      Kriss Leyva MD

## 2017-11-22 NOTE — MR AVS SNAPSHOT
Visit Information Date & Time Provider Department Dept. Phone Encounter #  
 11/22/2017  4:15 PM Kriss Leyva MD [facility] [phone] [id number] Follow-up Instructions Return in about 3 months (around 2/22/2018). Your Appointments 12/1/2017  3:40 PM  
Follow Up with Danyelle Bauer DO [facility] ([facility]) Appt Note: Pt complains of chest pain; Rescheduling Appointment From 04/19/2017; 6/16 LM w/son n law/called another number that was given and no VM. ..letter mailed. ..sb; left 2 messages/letter mailed. ..sb... 06.28.17 Pt's daughter does not wish to reschedule this appt.//JBJ; yearly f/up/DX: MVP/patient is transferring her care back to [facility]/from Dr. Neetu David, approve to add [address] [address]  
[phone] [place] Raissa  
  
    
 12/12/2017  8:20 AM  
ESTABLISHED PATIENT with Frances Navarrete MD  
[facility] ([facility]) Appt Note: [facility]  [facility]  [address] [facility] [address] [address]  
  
    
 1/8/2018  1:40 PM  
Follow Up with Christel Arias MD  
[facility] at [address] [facility]) Appt Note: 6 month follow up KRU 6/29  
 [address] [facility] [address] [facility] [address]  
[phone]  
  
   
 [phone] [facility] [address]  [facility]  
  
    
 7/30/2018 12:30 PM  
Medicare Physical with Mauro Otero MD  
[facility]) Appt Note: [facility]   
 [facility] [address] [facility] [address]  
[address] [facility] [address] [place] [address] Upcoming Health Maintenance Date Due  
 GLAUCOMA SCREENING Q2Y 5/8/1996 MEDICARE YEARLY EXAM 6/27/2018 DTaP/Tdap/Td series (2 - Td) 6/26/2027 Allergies as of 11/22/2017  Review Complete On: 11/22/2017 By: Anuja Dawson MD  
  
 Severity Noted Reaction Type Reactions Avapro [Irbesartan]  01/21/2011   Side Effect Other (comments) Hypotension and dizziness Crestor [Rosuvastatin]  01/21/2011   Side Effect Itching, Myalgia Pcn [Penicillins]  01/21/2011   Side Effect Rash Other reaction(s): hives Plaquenil [Hydroxychloroquine]  11/15/2016    Rash Quinine  01/21/2011   Side Effect Other (comments) Other reaction(s): other/intolerance \"ringing in my ears\" Ringing in ear Current Immunizations  Reviewed on 7/11/2016 Name Date Influenza High Dose Vaccine PF 11/1/2016 Influenza Vaccine 10/1/2010 Novel Influenza-H1N1-09, All Formulations 10/1/2010 Pneumococcal Polysaccharide (PPSV-23) 5/1/2010 Not reviewed this visit You Were Diagnosed With   
  
 Codes Comments Sjogren's syndrome, with unspecified organ involvement ([facility]    -  Primary ICD-10-CM: M35.00 ICD-9-CM: 710.2 Essential hypertension, benign     ICD-10-CM: I10 
ICD-9-CM: 401.1 Mixed hyperlipidemia     ICD-10-CM: E78.2 ICD-9-CM: 272.2 Pulmonary emphysema, unspecified emphysema type ([facility]     ICD-10-CM: J43.9 ICD-9-CM: 492.8 Non-Hodgkin lymphoma of extranodal site excluding spleen and other solid organs, unspecified non-Hodgkin lymphoma type (HCC)     ICD-10-CM: C85.99 
ICD-9-CM: 202.80 Non-rheumatic mitral regurgitation     ICD-10-CM: I34.0 ICD-9-CM: 424.0 Vitals BP Pulse Temp Resp Height(growth percentile) Weight(growth percentile) 144/80 (BP 1 Location: Left arm, BP Patient Position: Sitting) 67 98 °F (36.7 °C) (Oral) 10 5' 1\" (1.549 m) 128 lb (58.1 kg) SpO2 BMI OB Status Smoking Status 98% 24.19 kg/m2 Hysterectomy Former Smoker Vitals History BMI and BSA Data Body Mass Index Body Surface Area 24.19 kg/m 2 1.58 m 2 Preferred Pharmacy Pharmacy Name Phone [facility] [address] Gina Johnson [phone] Your Updated Medication List  
  
   
This list is accurate as of: 11/22/17  5:08 PM.  Always use your most recent med list.  
  
  
  
  
 acetaminophen 325 mg tablet Commonly known as:  TYLENOL Take 325 mg by mouth as needed for Pain. aspirin delayed-release 81 mg tablet Take 81 mg by mouth daily. citalopram 10 mg tablet Commonly known as:  Cheryal Coke Take 1 Tab by mouth daily. furosemide 40 mg tablet Commonly known as:  LASIX Take 1 Tab by mouth daily. ibuprofen 200 mg tablet Commonly known as:  MOTRIN Take 400 mg by mouth every six (6) hours as needed for Pain. KLOR-CON M10 10 mEq tablet Generic drug:  potassium chloride TAKE ONE TABLET BY MOUTH DAILY  
  
 metoprolol tartrate 50 mg tablet Commonly known as:  LOPRESSOR Take 1 Tab by mouth two (2) times a day. MIRALAX 17 gram packet Generic drug:  polyethylene glycol Take 17 g by mouth daily. pravastatin 10 mg tablet Commonly known as:  PRAVACHOL Take 1 Tab by mouth daily. raNITIdine 150 mg tablet Commonly known as:  ZANTAC Take 1 Tab by mouth daily. We Performed the Following REFERRAL TO PHYSICAL THERAPY [QVJ33 Custom] Comments:  
 Please evaluate patient for hip pain  S/P left hip replacement. REFERRAL TO RHEUMATOLOGY [MBE52 Custom] Comments:  
 Please evaluate patient for Lupus/Sjogren's Syndrome. Follow-up Instructions Return in about 3 months (around 2/22/2018). To-Do List   
 12/28/2017 1:00 PM  
  Appointment with [facility] at [facility] ([phone]) CONTRAST STUDY: 1. The patient should not eat solid food four hours before the appointment but should be encouraged to drink clear liquids.  2. If you have to drink oral contrast, please pick it up any weekday prior to your appointment, if you cannot please check in 2 hrs before appt time. 3.  The patient will require IV access for contrast administration. 4.  The patient should not take Ibuprofen (Advil, Motrin, etc.) and Naproxen Sodium (Aleve, etc.)  on the day of the exam. Stopping non-steroidal anti-inflammatory agents (NSAIDs) like Ibuprofen decreases the risk of kidney damage from the x-ray contrast (dye). 5.  Bring any non [facility] facility films/images pertaining to the area of interest with you on the day of appointment. 6.  Bring current lab work if available (within last 90 days CMP) ***If scheduled at [facility], iSTAT is not available, labs will need to be done before appointment*** 7. Check in at registration at least 30 minutes before appt time unless you were instructed to do otherwise. Referral Information Referral ID Referred By Referred To  
  
 [id number] Rosalio Nieves 83, DO   
   [address] [place], [address] [place] [address] Str. Phone: [phone] Fax: [phone] Visits Status Start Date End Date 1 New Request 11/22/17 11/22/18 If your referral has a status of pending review or denied, additional information will be sent to support the outcome of this decision. Referral ID Referred By Referred To  
 [id number] ALLEN Nieves Not Available Visits Status Start Date End Date 1 New Request 11/22/17 11/22/18 If your referral has a status of pending review or denied, additional information will be sent to support the outcome of this decision. Patient Instructions High Blood Pressure: Care Instructions Your Care Instructions If your blood pressure is usually above 140/90, you have high blood pressure, or hypertension. That means the top number is 140 or higher or the bottom number is 90 or higher, or both. Despite what a lot of people think, high blood pressure usually doesn't cause headaches or make you feel dizzy or lightheaded. It usually has no symptoms. But it does increase your risk for heart attack, stroke, and kidney or eye damage. The higher your blood pressure, the more your risk increases. Your doctor will give you a goal for your blood pressure. Your goal will be based on your health and your age. An example of a goal is to keep your blood pressure below 140/90. Lifestyle changes, such as eating healthy and being active, are always important to help lower blood pressure. You might also take medicine to reach your blood pressure goal. 
Follow-up care is a key part of your treatment and safety. Be sure to make and go to all appointments, and call your doctor if you are having problems. It's also a good idea to know your test results and keep a list of the medicines you take. How can you care for yourself at home? Medical treatment · If you stop taking your medicine, your blood pressure will go back up. You may take one or more types of medicine to lower your blood pressure. Be safe with medicines. Take your medicine exactly as prescribed. Call your doctor if you think you are having a problem with your medicine. · Talk to your doctor before you start taking aspirin every day. Aspirin can help certain people lower their risk of a heart attack or stroke. But taking aspirin isn't right for everyone, because it can cause serious bleeding. · See your doctor regularly. You may need to see the doctor more often at first or until your blood pressure comes down. · If you are taking blood pressure medicine, talk to your doctor before you take decongestants or anti-inflammatory medicine, such as ibuprofen. Some of these medicines can raise blood pressure. · Learn how to check your blood pressure at home. Lifestyle changes · Stay at a healthy weight.  This is especially important if you put on weight around the waist. Losing even 10 pounds can help you lower your blood pressure. · If your doctor recommends it, get more exercise. Walking is a good choice. Bit by bit, increase the amount you walk every day. Try for at least 30 minutes on most days of the week. You also may want to swim, bike, or do other activities. · Avoid or limit alcohol. Talk to your doctor about whether you can drink any alcohol. · Try to limit how much sodium you eat to less than 2,300 milligrams (mg) a day. Your doctor may ask you to try to eat less than 1,500 mg a day. · Eat plenty of fruits (such as bananas and oranges), vegetables, legumes, whole grains, and low-fat dairy products. · Lower the amount of saturated fat in your diet. Saturated fat is found in animal products such as milk, cheese, and meat. Limiting these foods may help you lose weight and also lower your risk for heart disease. · Do not smoke. Smoking increases your risk for heart attack and stroke. If you need help quitting, talk to your doctor about stop-smoking programs and medicines. These can increase your chances of quitting for good. When should you call for help? Call 911 anytime you think you may need emergency care. This may mean having symptoms that suggest that your blood pressure is causing a serious heart or blood vessel problem. Your blood pressure may be over 180/110. ? For example, call 911 if: 
? · You have symptoms of a heart attack. These may include: ¨ Chest pain or pressure, or a strange feeling in the chest. 
¨ Sweating. ¨ Shortness of breath. ¨ Nausea or vomiting. ¨ Pain, pressure, or a strange feeling in the back, neck, jaw, or upper belly or in one or both shoulders or arms. ¨ Lightheadedness or sudden weakness. ¨ A fast or irregular heartbeat. ? · You have symptoms of a stroke. These may include: 
¨ Sudden numbness, tingling, weakness, or loss of movement in your face, arm, or leg, especially on only one side of your body. ¨ Sudden vision changes. ¨ Sudden trouble speaking. ¨ Sudden confusion or trouble understanding simple statements. ¨ Sudden problems with walking or balance. ¨ A sudden, severe headache that is different from past headaches. ? · You have severe back or belly pain. ?Do not wait until your blood pressure comes down on its own. Get help right away. ?Call your doctor now or seek immediate care if: 
? · Your blood pressure is much higher than normal (such as 180/110 or higher), but you don't have symptoms. ? · You think high blood pressure is causing symptoms, such as: ¨ Severe headache. ¨ Blurry vision. ? Watch closely for changes in your health, and be sure to contact your doctor if: 
? · Your blood pressure measures 140/90 or higher at least 2 times. That means the top number is 140 or higher or the bottom number is 90 or higher, or both. ? · You think you may be having side effects from your blood pressure medicine. ? · Your blood pressure is usually normal, but it goes above normal at least 2 times. Where can you learn more? Go to http://melvina-bridger.info/. Enter W294 in the search box to learn more about \"High Blood Pressure: Care Instructions. \" Current as of: September 21, 2016 Content Version: 11.4 © 4047-4038 [employer] .fr [place]. Care instructions adapted under license by [employer] (which disclaims liability or warranty for this information). If you have questions about a medical condition or this instruction, always ask your healthcare professional. [place] [address] any warranty or liability for your use of this information. Introducing [facility]! [facility] introduces [email or web address] patient portal. Now you can access parts of your medical record, email your doctor's office, and request medication refills online. 1. In your internet browser, go to [email or web address] 2. Click on the First Time User? Click Here link in the Sign In box. You will see the New Member Sign Up page. 3. Enter your [employer] Access Code exactly as it appears below. You will not need to use this code after youve completed the sign-up process. If you do not sign up before the expiration date, you must request a new code. · [employer] Access Code: [email or web address] Expires: 12/26/2017  6:33 PM 
 
4. Enter the last four digits of your Social Security Number (xxxx) and Date of Birth (mm/dd/yyyy) as indicated and click Submit. You will be taken to the next sign-up page. 5. Create a [employer] ID. This will be your [employer] login ID and cannot be changed, so think of one that is secure and easy to remember. 6. Create a [employer] password. You can change your password at any time. 7. Enter your Password Reset Question and Answer. This can be used at a later time if you forget your password. 8. Enter your e-mail address. You will receive e-mail notification when new information is available in [address] 9. Click Sign Up. You can now view and download portions of your medical record. 10. Click the Download Summary menu link to download a portable copy of your medical information. If you have questions, please visit the Frequently Asked Questions section of the [employer] website. Remember, [employer] is NOT to be used for urgent needs. For medical emergencies, dial 911. Now available from your iPhone and Android! Please provide this summary of care documentation to your next provider. Your primary care clinician is listed as Mauro Otero. If you have any questions after today's visit, please call [phone].

## 2017-11-22 NOTE — PROGRESS NOTES
New patient here to establish care she is asking for a referral to Dr. Keyana Tracy, she also c/o pain in her buttocks area near where she had a bed sore but the bed sore is not causing the pain. Patient is here with her daughter and is asking for a referral to PT for her left hip. Patient has been having issues with sleepiness during the day as well as snoring.

## 2017-11-29 ENCOUNTER — TELEPHONE (OUTPATIENT)
Dept: FAMILY MEDICINE CLINIC | Age: 82
End: 2017-11-29

## 2017-11-29 NOTE — TELEPHONE ENCOUNTER
Patient's daughter stopped by asking about an order for an xray of tailbone due to pain in that area. She would like a call at [phone] or [facility]#[phone] when done. The daughter's name is Jessi Gavin who is on her permission to release for all access.

## 2017-11-30 DIAGNOSIS — M53.3 COCCYX PAIN: Primary | ICD-10-CM

## 2017-12-01 ENCOUNTER — OFFICE VISIT (OUTPATIENT)
Dept: CARDIOLOGY CLINIC | Age: 82
End: 2017-12-01

## 2017-12-01 VITALS
BODY MASS INDEX: 23.6 KG/M2 | WEIGHT: 125 LBS | DIASTOLIC BLOOD PRESSURE: 78 MMHG | HEART RATE: 64 BPM | OXYGEN SATURATION: 91 % | SYSTOLIC BLOOD PRESSURE: 126 MMHG | HEIGHT: 61 IN

## 2017-12-01 DIAGNOSIS — I11.9 BENIGN HYPERTENSIVE HEART DISEASE WITHOUT HEART FAILURE: ICD-10-CM

## 2017-12-01 DIAGNOSIS — R07.89 OTHER CHEST PAIN: ICD-10-CM

## 2017-12-01 DIAGNOSIS — M35.00 SLE-SJOGREN OVERLAP SYNDROME (HCC): ICD-10-CM

## 2017-12-01 DIAGNOSIS — E78.5 DYSLIPIDEMIA: ICD-10-CM

## 2017-12-01 DIAGNOSIS — I25.10 ATHEROSCLEROSIS OF NATIVE CORONARY ARTERY OF NATIVE HEART WITHOUT ANGINA PECTORIS: ICD-10-CM

## 2017-12-01 DIAGNOSIS — I50.33 ACUTE ON CHRONIC DIASTOLIC CONGESTIVE HEART FAILURE (HCC): ICD-10-CM

## 2017-12-01 DIAGNOSIS — R60.0 EDEMA OF BOTH LEGS: ICD-10-CM

## 2017-12-01 DIAGNOSIS — I34.0 NON-RHEUMATIC MITRAL REGURGITATION: Primary | ICD-10-CM

## 2017-12-01 DIAGNOSIS — E78.2 MIXED HYPERLIPIDEMIA: ICD-10-CM

## 2017-12-01 DIAGNOSIS — F03.90 DEMENTIA WITHOUT BEHAVIORAL DISTURBANCE, UNSPECIFIED DEMENTIA TYPE: ICD-10-CM

## 2017-12-01 DIAGNOSIS — M32.9 SLE-SJOGREN OVERLAP SYNDROME (HCC): ICD-10-CM

## 2017-12-01 NOTE — MR AVS SNAPSHOT
Visit Information Date & Time Provider Department Dept. Phone Encounter #  
 12/1/2017  3:40 PM Gilbert Obrien, [facility] [address] [id number] Follow-up Instructions Return in about 6 months (around 6/1/2018), or if symptoms worsen or fail to improve. Your Appointments 12/12/2017  8:20 AM  
ESTABLISHED PATIENT with Tone Nicholas MD  
[facility] ([facility]) Appt Note: [facility]t [facility] [address]  [address] [facility]  
[address] [facility] [address]  
  
    
 1/8/2018  1:40 PM  
Follow Up with Adolfo Deal MD  
[facility] at [address] Appt Note: 6 month follow up KRU 6/29  
 [address] [place] [address] [id number] [phone]  
  
   
 [address] [place] [address]  
  
    
 2/21/2018  5:30 PM  
ROUTINE CARE with Fabrizio Ochoa MD  
[facility] ([facility]) Appt Note: 3mo f/u for medical conditions [facility] 11 [address] [address] [phone]  
  
   
 [address] [address]  
  
    
 6/4/2018  3:00 PM  
Follow Up with Gilbert Obrien DO [facility] [place] [address] ([facility]) Appt Note: 6 month follow up [facility] [address] [address] [address] [phone] [facility] 53 [address]  
  
    
 7/30/2018 12:30 PM  
Medicare Physical with Emani Figueroa MD  
[facility] [address] [place] [address]) Appt Note: [facility]   
 [facility] [address] [facility] [address]  
[address] [facility] [address] Upcoming Health Maintenance  Date Due  
 GLAUCOMA SCREENING Q2Y 5/8/1996 MEDICARE YEARLY EXAM 6/27/2018 DTaP/Tdap/Td series (2 - Td) 6/26/2027 Allergies as of 12/1/2017  Review Complete On: 12/1/2017 By: Harper Quijano DO Severity Noted Reaction Type Reactions Avapro [Irbesartan]  01/21/2011   Side Effect Other (comments) Hypotension and dizziness Crestor [Rosuvastatin]  01/21/2011   Side Effect Itching, Myalgia Pcn [Penicillins]  01/21/2011   Side Effect Rash Other reaction(s): hives Plaquenil [Hydroxychloroquine]  11/15/2016    Rash Quinine  01/21/2011   Side Effect Other (comments) Other reaction(s): other/intolerance \"ringing in my ears\" Ringing in ear Current Immunizations  Reviewed on 7/11/2016 Name Date Influenza High Dose Vaccine PF 11/1/2016 Influenza Vaccine 10/1/2010 Novel Influenza-H1N1-09, All Formulations 10/1/2010 Pneumococcal Polysaccharide (PPSV-23) 5/1/2010 Not reviewed this visit You Were Diagnosed With   
  
 Codes Comments Non-rheumatic mitral regurgitation    -  Primary ICD-10-CM: I34.0 ICD-9-CM: 424.0 Atherosclerosis of native coronary artery of native heart without angina pectoris     ICD-10-CM: I25.10 ICD-9-CM: 414.01 Benign hypertensive heart disease without heart failure     ICD-10-CM: I11.9 ICD-9-CM: 402.10 Mixed hyperlipidemia     ICD-10-CM: E78.2 ICD-9-CM: 272.2 Vitals BP Pulse Height(growth percentile) Weight(growth percentile) SpO2 BMI  
 126/78 64 5' 1\" (1.549 m) 125 lb (56.7 kg) 91% 23.62 kg/m2 OB Status Smoking Status Hysterectomy Former Smoker Vitals History BMI and BSA Data Body Mass Index Body Surface Area  
 23.62 kg/m 2 1.56 m 2 Preferred Pharmacy Pharmacy Name Phone [facility] [address] [address], [address] Juliana Herrera [phone] Your Updated Medication List  
  
   
This list is accurate as of: 12/1/17  5:17 PM.  Always use your most recent med list.  
  
  
  
  
 acetaminophen 325 mg tablet Commonly known as:  TYLENOL Take 325 mg by mouth as needed for Pain. aspirin delayed-release 81 mg tablet Take 81 mg by mouth daily. citalopram 10 mg tablet Commonly known as:  Lajuanda Gearing Take 1 Tab by mouth daily. furosemide 40 mg tablet Commonly known as:  LASIX Take 1 Tab by mouth daily. ibuprofen 200 mg tablet Commonly known as:  MOTRIN Take 400 mg by mouth every six (6) hours as needed for Pain. KLOR-CON M10 10 mEq tablet Generic drug:  potassium chloride TAKE ONE TABLET BY MOUTH DAILY  
  
 metoprolol tartrate 50 mg tablet Commonly known as:  LOPRESSOR Take 1 Tab by mouth two (2) times a day. MIRALAX 17 gram packet Generic drug:  polyethylene glycol Take 17 g by mouth daily. pravastatin 10 mg tablet Commonly known as:  PRAVACHOL Take 1 Tab by mouth daily. raNITIdine 150 mg tablet Commonly known as:  ZANTAC Take 1 Tab by mouth daily. We Performed the Following AMB POC EKG ROUTINE W/ 12 LEADS, INTER & REP [83766 CPT(R)] Follow-up Instructions Return in about 6 months (around 6/1/2018), or if symptoms worsen or fail to improve. To-Do List   
 12/01/2017 Lab:  METABOLIC PANEL, BASIC   
  
 12/01/2017 Lab:  NT-PRO BNP   
  
 12/04/2017 12:00 PM  
  Appointment with Mary Jimenez PT at [address] [place] [address] ([phone]) Introducing [facility] & HEALTH SERVICES! [facility] introduces [employer] patient portal. Now you can access parts of your medical record, email your doctor's office, and request medication refills online. 1. In your internet browser, go to [email or web address] 2. Click on the First Time User? Click Here link in the Sign In box. You will see the New Member Sign Up page. 3. Enter your [employer] Access Code exactly as it appears below.  You will not need to use this code after youve completed the sign-up process. If you do not sign up before the expiration date, you must request a new code. · [employer] Access Code: [email or web address] Expires: 12/26/2017  6:33 PM 
 
4. Enter the last four digits of your Social Security Number (xxxx) and Date of Birth (mm/dd/yyyy) as indicated and click Submit. You will be taken to the next sign-up page. 5. Create a [employer] ID. This will be your [employer] login ID and cannot be changed, so think of one that is secure and easy to remember. 6. Create a [employer] password. You can change your password at any time. 7. Enter your Password Reset Question and Answer. This can be used at a later time if you forget your password. 8. Enter your e-mail address. You will receive e-mail notification when new information is available in [address] 9. Click Sign Up. You can now view and download portions of your medical record. 10. Click the Download Summary menu link to download a portable copy of your medical information. If you have questions, please visit the Frequently Asked Questions section of the [employer] website. Remember, [employer] is NOT to be used for urgent needs. For medical emergencies, dial 911. Now available from your iPhone and Android! Please provide this summary of care documentation to your next provider. Your primary care clinician is listed as Susanna Lockett. If you have any questions after today's visit, please call [phone].

## 2017-12-01 NOTE — PROGRESS NOTES
1. Have you been to the ER, urgent care clinic since your last visit? Hospitalized since your last visit?no    2. Have you seen or consulted any other health care providers outside of the [address] [place] [address] since your last visit? Include any pap smears or colon screening.  no

## 2017-12-01 NOTE — PROGRESS NOTES
Review of Systems   Constitutional: Positive for malaise/fatigue. Negative for chills, fever and weight loss. Respiratory: Positive for cough and shortness of breath. Negative for hemoptysis and wheezing. Cardiovascular: Positive for chest pain and leg swelling. Negative for palpitations and orthopnea. Gastrointestinal: Positive for constipation, nausea and vomiting. Negative for abdominal pain, blood in stool, diarrhea, heartburn and melena. Musculoskeletal: Positive for joint pain and myalgias. Negative for falls. Neurological: Negative for dizziness.

## 2017-12-01 NOTE — PROGRESS NOTES
HPI:  I saw Jamison Verde in my office today in cardiovascular evaluation regarding her chronic ischemic heart disease. Ms. Suzi Monte is a pleasant 80year old  female with history of hypertension, hypercholesterolemia, erythrocytosis, Sjogren's syndrome, non Hodgkin's lymphoma, and coronary artery disease, status post stenting of a right coronary artery back in August of 2007. She has had a lot of problems with shortness of breath and fatigue over the years, which I have felt have been multifactorial. She has had two separate cardiac catheterizations since her angioplasty due to these symptoms, one in 2011 and another one in September, 2012, both of which demonstrated nonobstructive coronary artery disease without recurrent in stent restenosis.     She comes in to the office today and relates that she is doing about the same. She does get short of breath with any significant activity and does have persistent chronic fatigue, which has been unchanged since I saw her several months ago. She does have history of lymphoma, for which she is followed by Dr. Mariana Pagan, and for which she has had radiation, and apparently that seems to be stable. She also is followed by Dr. Jamila Braxton for Sjogren's syndrome and that seems to be stable as well, according to the patient. She does have some chronic fibrotic rales in her right base and history of a right upper lobectomy for blebs back in 4046, so certainly part of her problem could be pulmonary and her O2 saturation on room air are 91 % today.      When I saw her last in the office December 2015 she seemed to be having the same shortness of breath issues and some desaturation with ambulation suggesting that this was a pulmonary process, but in the past couple of years she has been living up in the [place] area and more recently saw Dr. Domingo England for her increased shortness of breath and apparently found to have a significant mitral regurgitation murmur which prompted an echocardiogram August 18, 2017 which revealed moderate left atrial enlargement and prolapse of the anterior cusp of the mitral valve with severe mitral regurgitation. In reviewing her echo from back in December 2010 she had only mild mitral regurgitation at that time and in reviewing my note of December 15, 2015 she did not appear to have a significant heart murmur. However, currently she does have a significant mitral regurgitation murmur and clinically appears to have some component of heart failure though this seems to be compensated. She does complain of some chest pains, but these appear to be quite atypical for angina. Encounter Diagnoses   Name Primary?  Acute on chronic diastolic congestive heart failure ([address] compensated     Edema of both legs     Non cardiac chest pain     Non-rheumatic mitral regurgitation, severe Yes    Atherosclerosis of native coronary artery of native heart without angina pectoris     Hypertensive heart disease     Mixed hyperlipidemia     Dyslipidemia     SLE-Sjogren overlap syndrome (HCC)     Dementia without behavioral disturbance, unspecified dementia type         Discussion: This lady has some problems with peripheral edema and certainly could have a component of diastolic heart failure in the setting of her severe mitral regurgitation. However, she has fairly normal left ventricular function on her echo back in August 2017 with normal LV size and particularly since she appears to have a component of dementia I do not think any intervention on her mitral valve would be warranted at this time. I would like to get a BMP and a BNP as a baseline on her to see if there is a significant component of diastolic heart failure which might warrant an adjustment of her diuretics and I will plan to reevaluate her again in about 3 months.     Her O2 saturations are low at rest at 91% and she has had history of desaturation with ambulation in the past. It wasn't clear to me at the time of her evaluation if she had O2 at home and we will check with her and if she doesn't I would plan an overnight O2 saturation evaluation and get her qualified for supplemental oxygen if she doesn't have it presently. PCP:   German Anderson MD      Past Medical History:   Diagnosis Date    CAD (coronary artery disease)     Chronic    Chronic airway obstruction, not elsewhere classified     Frequent cough, wheezing secondary to lupus and COPD    Closed left hip fracture (HCC)     Decubitus ulcer of sacral region, stage 3 ([address] 8/18/2016    Dyspnea on exertion     Echocardiogram 12/02/2010    EF 60-65%. Gr 1 DDfx. Mild LVH. Mild MR.    Essential hypertension, benign     GERD (gastroesophageal reflux disease)     Headache(784.0)     History of myocardial perfusion scan 09/14/2012    No evidence of ischemia or infarction. Very mild apical thinning. EF 77%. No WMA. TID 1.46, suggests 3-vessel CAD. Intermediate to high risk pharm stress test due to TID.  Hypercholesterolemia     Lower extremity venous duplex 05/08/2013    Left leg:  No venous thrombosis or venous insufficiency.  Lupus erythematosus 1992    Lymphoma, non-Hodgkin's ([facility] 5/2011    Low-grade being followed by Dr. Randall Ozuna without therapy currently    Mitral valve prolapse     10/2017    Pneumothorax 1981    Blebs in right lung with recurrent Pneumothorax    S/P cardiac cath 09/24/2012    Hvy calcification of coronary arteries. LM minimal.  mLAD 35%. oD2 70% (unchanged). LCX mild. pRCA mild. Minimal ISR of prev stent. LVEDP 14-16. EF 65%. No WMA.   Likely a falsely abnormal stress test.    Short-term memory loss     From fall in 2003    Sjogren's syndrome ([address] 1992    report of ROGELIO, SSA, RF positive    Traumatic subdural hematoma ([address] 2003    Head injury with subdural - s/p fall         Past Surgical History:   Procedure Laterality Date    CARDIAC SURG PROCEDURE UNLIST      cardiac stent  CHEST SURGERY PROCEDURE UNLISTED  1981    RUL removal    HX ADENOIDECTOMY  11/2013    eyelids lifted    HX CATARACT REMOVAL Bilateral     w/ lens implants    HX CORONARY STENT PLACEMENT  8/21/2007    HX HEART CATHETERIZATION  9/24/2012    HX HEART CATHETERIZATION  8/21/2007    Dr. Rani Allan at [facility] - stent placed    HX HEENT  2003    subdural hematoma removed s/p fall    HX HYSTERECTOMY  1974    HX LOBECTOMY Right     upper portion    HX ORTHOPAEDIC  07/2016    Patial left hip replacement    HX PNEUMONECTOMY      partial    NEUROLOGICAL PROCEDURE UNLISTED      subdural hematoma         Current Outpatient Rx   Name  Route  Sig  Dispense  Refill    pravastatin (PRAVACHOL) 40 mg tablet        TAKE 1 TABLET BY MOUTH DAILY. 30 Tab    6       CYCLE FILL MEDICATION. Authorization is required f ...  potassium chloride (K-DUR, KLOR-CON) 10 mEq tablet    Oral    Take 1 Tab by mouth daily. 30 Tab    6      metoprolol (LOPRESSOR) 50 mg tablet    Oral    Take 1 Tab by mouth two (2) times a day. 60 Tab    11      ranitidine (ZANTAC) 150 mg tablet    Oral    Take 150 mg by mouth daily.  aspirin 81 mg tablet    Oral    Take 81 mg by mouth daily.  furosemide (LASIX) 20 mg tablet    Oral    Take 20 mg by mouth daily.  budesonide (RHINOCORT AQUA) 32 mcg/Actuation nasal spray    Nasal    2 Sprays by Nasal route as needed.  ALBUTEROL SULFATE (VENTOLIN IN)    Inhalation    Take 2 puffs by inhalation every four (4) hours as needed.                    Allergies   Allergen Reactions    Avapro [Irbesartan] Other (comments)     Hypotension and dizziness    Crestor [Rosuvastatin] Itching and Myalgia    Pcn [Penicillins] Rash     Other reaction(s): hives    Plaquenil [Hydroxychloroquine] Rash    Quinine Other (comments)     Other reaction(s): other/intolerance  \"ringing in my ears\"  Ringing in ear       Review of Systems:   Review of Systems   Constitutional: Positive for malaise/fatigue. Negative for chills, fever and weight loss. Respiratory: Positive for cough and shortness of breath. Negative for hemoptysis and wheezing. Cardiovascular: Positive for chest pain and leg swelling. Negative for palpitations and orthopnea. Gastrointestinal: Positive for constipation, nausea and vomiting. Negative for abdominal pain, blood in stool, diarrhea, heartburn and melena. Musculoskeletal: Positive for joint pain and myalgias. Negative for falls. Neurological: Negative for dizziness. Physical Exam:    Visit Vitals    /78    Pulse 64    Ht 5' 1\" (1.549 m)    Wt 56.7 kg (125 lb)    SpO2 91%    BMI 23.62 kg/m2       The patient was a cooperative, alert, well-developed, well-nourished, 51-year-old, Critical access hospital American female, who is in no acute distress at the time of the examination. HEENT: Conjuctiva white, mucosa moist, no pallor or cyanosis. NECK: Supple without masses, tenderness or thyromegaly. There was no jugular venous distention. Carotid are full bilaterally without bruits. CHEST: Symmetrical with good excursion. LUNGS: Clear to auscultation in all fields. HEART: The apex is not displaced. There were no lifts, thrills or heaves. There is a normal S1 and S2. There is a grade 2/6 holosystolic murmur at the apex with radiation to the axilla without appreciable diastolic murmurs, rubs, clicks, or gallops auscultated. ABDOMEN: Soft without masses, tenderness or organomegaly. EXTREMITIES: Full peripheral pulses with trace to 1 +  peripheral edema of the right leg with minimal on the left. Review of Data: Please refer to past medical history for most recent cardiac testing.   Lab Results   Component Value Date/Time    Cholesterol, total 176 06/08/2015 12:00 AM    HDL Cholesterol 82 06/08/2015 12:00 AM    LDL, calculated 82 06/08/2015 12:00 AM    Triglyceride 60 06/08/2015 12:00 AM    CHOL/HDL Ratio 2.2 05/03/2011 09:05 AM         Results for orders placed or performed in visit on 12/01/17   [facility] POC EKG ROUTINE W/ 12 LEADS, INTER & REP     Status: None    Narrative    Normal sinus rhythm rate 64. Mild diffuse nonspecific ST-T flattening. This EKG is similar to the EKG of October 12, 2017. Ari Ortez D.O., TRUC. [facility]  [facility] [place]  [address] [place] [address]. [address]    PLEASE NOTE:  This document has been produced using voice recognition software. Unrecognized errors in transcription may be present.

## 2017-12-01 NOTE — TELEPHONE ENCOUNTER
Pt's daughter is aware xray has been ordered. Pt expressed clear understanding and had no further questions.

## 2017-12-02 ENCOUNTER — HOSPITAL ENCOUNTER (OUTPATIENT)
Dept: LAB | Age: 82
Discharge: HOME | End: 2017-12-02
Payer: MEDICARE

## 2017-12-02 ENCOUNTER — HOSPITAL ENCOUNTER (OUTPATIENT)
Dept: GENERAL RADIOLOGY | Age: 82
Discharge: HOME | End: 2017-12-02
Payer: MEDICARE

## 2017-12-02 DIAGNOSIS — I11.9 BENIGN HYPERTENSIVE HEART DISEASE WITHOUT HEART FAILURE: ICD-10-CM

## 2017-12-02 DIAGNOSIS — M53.3 COCCYX PAIN: ICD-10-CM

## 2017-12-02 DIAGNOSIS — I25.10 ATHEROSCLEROSIS OF NATIVE CORONARY ARTERY OF NATIVE HEART WITHOUT ANGINA PECTORIS: ICD-10-CM

## 2017-12-02 DIAGNOSIS — I34.0 NON-RHEUMATIC MITRAL REGURGITATION: ICD-10-CM

## 2017-12-02 LAB
ANION GAP SERPL CALC-SCNC: 10 MMOL/L (ref 3–18)
BNP SERPL-MCNC: 992 PG/ML (ref 0–1800)
BUN SERPL-MCNC: 16 MG/DL (ref 7–18)
BUN/CREAT SERPL: 12 (ref 12–20)
CALCIUM SERPL-MCNC: 9.4 MG/DL (ref 8.5–10.1)
CHLORIDE SERPL-SCNC: 101 MMOL/L (ref 100–108)
CO2 SERPL-SCNC: 29 MMOL/L (ref 21–32)
CREAT SERPL-MCNC: 1.33 MG/DL (ref 0.6–1.3)
GLUCOSE SERPL-MCNC: 95 MG/DL (ref 74–99)
POTASSIUM SERPL-SCNC: 3.6 MMOL/L (ref 3.5–5.5)
SODIUM SERPL-SCNC: 140 MMOL/L (ref 136–145)

## 2017-12-02 PROCEDURE — 72220 X-RAY EXAM SACRUM TAILBONE: CPT

## 2017-12-02 PROCEDURE — 83880 ASSAY OF NATRIURETIC PEPTIDE: CPT | Performed by: INTERNAL MEDICINE

## 2017-12-02 PROCEDURE — 80048 BASIC METABOLIC PNL TOTAL CA: CPT | Performed by: INTERNAL MEDICINE

## 2017-12-02 PROCEDURE — 36415 COLL VENOUS BLD VENIPUNCTURE: CPT | Performed by: INTERNAL MEDICINE

## 2017-12-04 ENCOUNTER — APPOINTMENT (OUTPATIENT)
Dept: PHYSICAL THERAPY | Age: 82
End: 2017-12-04

## 2017-12-05 ENCOUNTER — TELEPHONE (OUTPATIENT)
Dept: FAMILY MEDICINE CLINIC | Age: 82
End: 2017-12-05

## 2017-12-05 DIAGNOSIS — M25.559 ARTHRALGIA OF HIP, UNSPECIFIED LATERALITY: ICD-10-CM

## 2017-12-05 DIAGNOSIS — M53.3 COCCYX PAIN: Primary | ICD-10-CM

## 2017-12-05 NOTE — TELEPHONE ENCOUNTER
Pt's daughter wants to know what patient can do to help alleviate her tailbone and hip pain until she is seen by the specialist? She is using topical pain relief. She didn't know if sitting on cushion would help? I let her know a soft or gel cushion could help with the pressure, but that I would also send a message to dr Svetlana Chacon for his recommendation. Referral to ortho and spine have been placed. Daughter is aware.

## 2017-12-06 NOTE — TELEPHONE ENCOUNTER
Pt's daughter called stating her mother missed two calls and wasn't sure who it was that was calling. I let her know that it was ortho and spine calling to schedule her appts. Both numbers were given to her. I also let her know dr Anatoliy Moreira said that her mother can take ibuprofen 800 mg for the pain. She expressed understanding.

## 2017-12-11 ENCOUNTER — TELEPHONE (OUTPATIENT)
Dept: CARDIOLOGY CLINIC | Age: 82
End: 2017-12-11

## 2017-12-11 DIAGNOSIS — J84.10 PULMONARY FIBROSIS (HCC): Primary | ICD-10-CM

## 2017-12-11 NOTE — TELEPHONE ENCOUNTER
Patient daughter is aware and she will call the pulmonary to make appt. Faxed referral to pulmonary and faxed overnight order to isaiah.

## 2017-12-11 NOTE — TELEPHONE ENCOUNTER
Patient is not on oxygen at home or at night. Patient daughter was wondering if she needed a sleep study for possible apnea due to snoring and falling asleep easily.     Verbal order and read back per Sol Hernandez, DO  Order overnight ox to see if she needs oxygen  Refer to pulmonary Dr Bhupendra Montano for possible sleep apnea

## 2017-12-11 NOTE — PROGRESS NOTES
This was done to see if she had any component of heart failure in view of her severe mitral regurgitation, but she does not so I would leave her on her same daily Lasix. Please let the patient know. She did have a low SpO2 and we need to check if she is sleeping with oxygen and whether she uses it during the day and if not we need to get overnight O2 sats on her.   ES

## 2017-12-11 NOTE — TELEPHONE ENCOUNTER
----- Message from Sophia Cope DO sent at 12/10/2017  8:53 PM EST -----  This was done to see if she had any component of heart failure in view of her severe mitral regurgitation, but she does not so I would leave her on her same daily Lasix. Please let the patient know. She did have a low SpO2 and we need to check if she is sleeping with oxygen and whether she uses it during the day and if not we need to get overnight O2 sats on her.   ES

## 2017-12-19 RX ORDER — POTASSIUM CHLORIDE 750 MG/1
TABLET, EXTENDED RELEASE ORAL
Qty: 90 TAB | Refills: 3 | Status: SHIPPED | OUTPATIENT
Start: 2017-12-19 | End: 2018-08-10 | Stop reason: ALTCHOICE

## 2018-01-02 ENCOUNTER — TELEPHONE (OUTPATIENT)
Dept: CARDIOLOGY CLINIC | Age: 83
End: 2018-01-02

## 2018-01-02 NOTE — TELEPHONE ENCOUNTER
Patient's daughter called stating patient has a  to attend in [place] this Saturday and wants to know if Dr. Juarez Pastor ok's her to fly there and back. I will forward to Dr. Juarez Pastor.

## 2018-01-05 NOTE — PROGRESS NOTES
This is BNP looks fine suggesting that any component of diastolic heart failure is well compensated. She did have somewhat low saturations when she was in the office and I was going to check to see if she was on O2 at home. When you call her with the results please check to see if she is on O2 at home and how her weight is doing and whether or not her shortness of breath is any worse.  ES

## 2018-01-11 ENCOUNTER — TELEPHONE (OUTPATIENT)
Dept: CARDIOLOGY CLINIC | Age: 83
End: 2018-01-11

## 2018-01-11 NOTE — TELEPHONE ENCOUNTER
Verbal order and read back per Fabien Callery, DO    Ok for procedure (endoscopy). Letter faxed to [facility].

## 2018-01-11 NOTE — LETTER
1/11/2018 8:58 AM 
 
Ms. John Sierra [address] [address] John Sierra was seen in our office on 12/01/17 for cardiac evaluation. From a cardiac standpoint she is ok to proceed with endoscopy on 2/13/18. Please feel free to contact our office if you have any questions regarding this patient.   
 
 
 
 
 
 
Sincerely, 
 
 
 
 
 
Long Herring, DO

## 2018-01-15 ENCOUNTER — OFFICE VISIT (OUTPATIENT)
Dept: ORTHOPEDIC SURGERY | Age: 83
End: 2018-01-15

## 2018-01-15 VITALS
TEMPERATURE: 98.2 F | HEART RATE: 78 BPM | WEIGHT: 128 LBS | BODY MASS INDEX: 24.17 KG/M2 | RESPIRATION RATE: 18 BRPM | SYSTOLIC BLOOD PRESSURE: 139 MMHG | HEIGHT: 61 IN | DIASTOLIC BLOOD PRESSURE: 81 MMHG | OXYGEN SATURATION: 95 %

## 2018-01-15 DIAGNOSIS — M25.552 LEFT HIP PAIN: ICD-10-CM

## 2018-01-15 DIAGNOSIS — M62.838 MUSCLE SPASM: ICD-10-CM

## 2018-01-15 DIAGNOSIS — R26.9 GAIT ABNORMALITY: ICD-10-CM

## 2018-01-15 DIAGNOSIS — M47.816 LUMBAR FACET ARTHROPATHY: ICD-10-CM

## 2018-01-15 DIAGNOSIS — M53.3 SACRAL PAIN: Primary | ICD-10-CM
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
## 2018-01-15 NOTE — PATIENT INSTRUCTIONS
Low Back Arthritis: Exercises  Your Care Instructions  Here are some examples of typical rehabilitation exercises for your condition. Start each exercise slowly. Ease off the exercise if you start to have pain. Your doctor or physical therapist will tell you when you can start these exercises and which ones will work best for you. When you are not being active, find a comfortable position for rest. Some people are comfortable on the floor or a medium-firm bed with a small pillow under their head and another under their knees. Some people prefer to lie on their side with a pillow between their knees. Don't stay in one position for too long. Take short walks (10 to 20 minutes) every 2 to 3 hours. Avoid slopes, hills, and stairs until you feel better. Walk only distances you can manage without pain, especially leg pain. How to do the exercises  Pelvic tilt    1. Lie on your back with your knees bent. 2. \"Brace\" your stomach-tighten your muscles by pulling in and imagining your belly button moving toward your spine. 3. Press your lower back into the floor. You should feel your hips and pelvis rock back. 4. Hold for 6 seconds while breathing smoothly. 5. Relax and allow your pelvis and hips to rock forward. 6. Repeat 8 to 12 times. Back stretches    1. Get down on your hands and knees on the floor. 2. Relax your head and allow it to droop. Round your back up toward the ceiling until you feel a nice stretch in your upper, middle, and lower back. Hold this stretch for as long as it feels comfortable, or about 15 to 30 seconds. 3. Return to the starting position with a flat back while you are on your hands and knees. 4. Let your back sway by pressing your stomach toward the floor. Lift your buttocks toward the ceiling. 5. Hold this position for 15 to 30 seconds. 6. Repeat 2 to 4 times. Follow-up care is a key part of your treatment and safety.  Be sure to make and go to all appointments, and call your doctor if you are having problems. It's also a good idea to know your test results and keep a list of the medicines you take. Where can you learn more? Go to http://melvina-bridger.info/. Enter V513 in the search box to learn more about \"Low Back Arthritis: Exercises. \"  Current as of: March 21, 2017  Content Version: 11.4  © 6809-8958 [employer]. Care instructions adapted under license by Lorena Gaxiola (which disclaims liability or warranty for this information). If you have questions about a medical condition or this instruction, always ask your healthcare professional. [place] [address] any warranty or liability for your use of this information. Sacroiliac Pain: Exercises  Your Care Instructions  Here are some examples of typical rehabilitation exercises for your condition. Start each exercise slowly. Ease off the exercise if you start to have pain. Your doctor or physical therapist will tell you when you can start these exercises and which ones will work best for you. How to do the exercises  Knee-to-chest stretch    7. Do not do the knee-to-chest exercise if it causes or increases back or leg pain. 8. Lie on your back with your knees bent and your feet flat on the floor. You can put a small pillow under your head and neck if it is more comfortable. 9. Grasp your hands under one knee and bring the knee to your chest, keeping the other foot flat on the floor. 10. Keep your lower back pressed to the floor. Hold for at least 15 to 30 seconds. 11. Relax and lower the knee to the starting position. Repeat with the other leg. 12. Repeat 2 to 4 times with each leg. 13. To get more stretch, keep your other leg flat on the floor while pulling your knee to your chest.  Bridging    7. Lie on your back with both knees bent. Your knees should be bent about 90 degrees. 8. Tighten your belly muscles by pulling in your belly button toward your spine.  Then push your feet into the floor, squeeze your buttocks, and lift your hips off the floor until your shoulders, hips, and knees are all in a straight line. 9. Hold for about 6 seconds as you continue to breathe normally, and then slowly lower your hips back down to the floor and rest for up to 10 seconds. 10. Repeat 8 to 12 times. Hip extension    1. Get down on your hands and knees on the floor. 2. Keeping your back and neck straight, lift one leg straight out behind you. When you lift your leg, keep your hips level. Don't let your back twist, and don't let your hip drop toward the floor. 3. Hold for 6 seconds. Repeat 8 to 12 times with each leg. 4. If you feel steady and strong when you do this exercise, you can make it more difficult. To do this, when you lift your leg, also lift the opposite arm straight out in front of you. For example, lift the left leg and the right arm at the same time. (This is sometimes called the \"bird dog exercise. \") Hold for 6 seconds, and repeat 8 to 12 times on each side. Clamshell    1. Lie on your side with a pillow under your head. Keep your feet and knees together and your knees bent. 2. Raise your top knee, but keep your feet together. Do not let your hips roll back. Your legs should open up like a clamshell. 3. Hold for 6 seconds. 4. Slowly lower your knee back down. Rest for 10 seconds. 5. Repeat 8 to 12 times. 6. Switch to your other side and repeat steps 1 through 5. Hamstring wall stretch    1. Lie on your back in a doorway, with one leg through the open door. 2. Slide your affected leg up the wall to straighten your knee. You should feel a gentle stretch down the back of your leg. 1. Do not arch your back. 2. Do not bend either knee. 3. Keep one heel touching the floor and the other heel touching the wall. Do not point your toes. 3. Hold the stretch for at least 1 minute to begin.  Then try to lengthen the time you hold the stretch to as long as 6 minutes. 4. Switch legs, and repeat steps 1 through 3.  5. Repeat 2 to 4 times. 6. If you do not have a place to do this exercise in a doorway, there is another way to do it:  7. Lie on your back, and bend one knee. 8. Loop a towel under the ball and toes of that foot, and hold the ends of the towel in your hands. 9. Straighten your knee, and slowly pull back on the towel. You should feel a gentle stretch down the back of your leg. 10. Switch legs, and repeat steps 1 through 3.  11. Repeat 2 to 4 times. Lower abdominal strengthening    1. Lie on your back with your knees bent and your feet flat on the floor. 2. Tighten your belly muscles by pulling your belly button in toward your spine. 3. Lift one foot off the floor and bring your knee toward your chest, so that your knee is straight above your hip and your leg is bent like the letter \"L. \"  4. Lift the other knee up to the same position. 5. Lower one leg at a time to the starting position. 6. Keep alternating legs until you have lifted each leg 8 to 12 times. 7. Be sure to keep your belly muscles tight and your back still as you are moving your legs. Be sure to breathe normally. Piriformis stretch    1. Lie on your back with your legs straight. 2. Lift your affected leg, and bend your knee. With your opposite hand, reach across your body, and then gently pull your knee toward your opposite shoulder. 3. Hold the stretch for 15 to 30 seconds. 4. Switch legs and repeat steps 1 through 3.  5. Repeat 2 to 4 times. Follow-up care is a key part of your treatment and safety. Be sure to make and go to all appointments, and call your doctor if you are having problems. It's also a good idea to know your test results and keep a list of the medicines you take. Where can you learn more? Go to http://melvina-bridger.info/. Enter F627 in the search box to learn more about \"Sacroiliac Pain: Exercises. \"  Current as of: March 21, 2017  Content Version: 11.4  © 7971-4584 [employer]. Care instructions adapted under license by [employer] (which disclaims liability or warranty for this information). If you have questions about a medical condition or this instruction, always ask your healthcare professional. [address] any warranty or liability for your use of this information.

## 2018-01-15 NOTE — PROGRESS NOTES
[facility]  [address] [place], [facility] [address]  Phone: [phone]  Fax: [phone]    NEW PATIENT    ASSESSMENT   Diagnoses and all orders for this visit:    1. Sacral pain  -     MRI PELV WO CONT; Future    2. Fall, sequela  -     MRI PELV WO CONT; Future    3. Lumbar facet arthropathy  -     MRI PELV WO CONT; Future    4. Muscle spasm  -     MRI PELV WO CONT; Future    5. Left hip pain  -     MRI PELV WO CONT; Future    6. Gait abnormality  -     MRI PELV WO CONT; Future         IMPRESSION AND PLAN:  Florian Avina is a 80 y.o. female with history of lumbar and coccyx. She complains of right sacral pain. Pt fell about 1.5 years ago for unknown reasons, fractured her left hip with the fall, had a partial left hip replacement, went to rehab, and developed almost a stage 4 bedsore. She has experienced pain in the lower back/ buttocks at the location of the bedsore since it healed. Pt takes ibuprofen 400-600 mg QHS prn with benefit and occasionally takes Tylenol. 1) Pt was given information on lumbar arthritis and sacroiliac exercises. 2) An open pelvic MRI was ordered. 3) I recommended the patient try Aspercaine cream or Salonpas patches. 4) Discussed trying Cymbalta 20 or 30 mg if needed. She will have to discontinue Celexa prior to starting the medication. 5) Pt may take ibuprofen at dinner and acetaminophen at bedtime as needed. 6) I encouraged the patient continue ambulating with the assistance of her cane. 7) Ms. Essence Patrick has a reminder for a \"due or due soon\" health maintenance. I have asked that she contact her primary care provider, Shiela Jones MD, for follow-up on this health maintenance. 8)  demonstrated consistency with prescribing. 9) Pt will follow-up in 3 weeks or sooner if needed. HISTORY OF PRESENT ILLNESS:  Florian Avina is a 80 y.o. female with history of lumbar and coccyx.  Pt presents to the office today as a new patient referred by Dr. Jazmyn Rosado. She complains of right sacral pain. Pt ambulates with a quad cane and states that her pain is worse when sitting, laying, and walking. She is unable to lay on her sides to due pain. Pt's daughter reports that she has recently purchased an adjustable bed, which has also provided some improvement in her leg swelling. She reports occasional dizziness but denies any weakness in the legs, particularly when changing positions. Pt admits that she is not very active with the colder weather and she generally walks around the house for exercise. She reports falling about 1.5 years ago for unknown reasons. Pt fractured her left hip with the fall, had a partial left hip replacement, went to rehab, and developed almost a stage 4 bedsore in the right lower back/buttock. She was followed by a wound care specialist in [place], [place]. Pt reports that after her bedsore healed she experienced pain in the lower back/ buttocks at the location of the bedsore. Pt states that she has not experienced any improvement in her pain since she experienced the bedsore. She admits to difficulty and weakness when flexing the left hip. Pt also reports pain in the left thigh. She has tried Lidocaine patches with minimal relief. Pt takes aspirin, had a stent placed in the past, and is not taking other blood thinners at this time. She takes ibuprofen 400-600 mg QHS prn with benefit and occasionally takes Tylenol with slight relief. Pt has taken Neurontin 100 mg in the past but is not sure if it was effective. She reports that it takes two people to help her into her bathtub. Pt is not diabetic and has a history of Lupus, Sjogren's' disease, and lymphoma. She denies any renal issues and tried Voltaren 1% gel with only initial relief.  Pt reports relief when trying Aspercaine cream. She has tried steroid injections in the past but is unsure of the exact location (lumbar vs. sacroiliac) Pt at this time desires to proceed with pelvic MRI. 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 Pt used to live in [place] and moved in with her daughter in 11/2017. Pain Scale: 8/10     PCP: Mark Arnold MD      Past Medical History:   Diagnosis Date    CAD (coronary artery disease)     Chronic    Chronic airway obstruction, not elsewhere classified     Frequent cough, wheezing secondary to lupus and COPD    Closed left hip fracture (HCC)     Decubitus ulcer of sacral region, stage 3 ([address] 8/18/2016    Dyspnea on exertion     Echocardiogram 12/02/2010    EF 60-65%. Gr 1 DDfx. Mild LVH. Mild MR.    Essential hypertension, benign     GERD (gastroesophageal reflux disease)     Headache(784.0)     History of myocardial perfusion scan 09/14/2012    No evidence of ischemia or infarction. Very mild apical thinning. EF 77%. No WMA. TID 1.46, suggests 3-vessel CAD. Intermediate to high risk pharm stress test due to TID.  Hypercholesterolemia     Lower extremity venous duplex 05/08/2013    Left leg:  No venous thrombosis or venous insufficiency.  Lupus erythematosus 1992    Lymphoma, non-Hodgkin's ([facility] 5/2011    Low-grade being followed by Dr. Judy Cortez without therapy currently    Mitral valve prolapse     10/2017    Pneumothorax 1981    Blebs in right lung with recurrent Pneumothorax    S/P cardiac cath 09/24/2012    Hvy calcification of coronary arteries. LM minimal.  mLAD 35%. oD2 70% (unchanged). LCX mild. pRCA mild. Minimal ISR of prev stent. LVEDP 14-16. EF 65%. No WMA.   Likely a falsely abnormal stress test.    Short-term memory loss     From fall in 2003    Sjogren's syndrome ([address] 1992    report of ROGELIO, SSA, RF positive    Traumatic subdural hematoma ([facility] [address] 2003    Head injury with subdural - s/p fall        Social History     Social History    Marital status:      Spouse name: N/A    Number of children: N/A    Years of education: N/A Occupational History    Not on file. Social History Main Topics    Smoking status: Former Smoker     Quit date: 1/1/1981    Smokeless tobacco: Never Used    Alcohol use Yes      Comment: once a month drinks wine    Drug use: No    Sexual activity: Not Currently     Other Topics Concern    Not on file     Social History Narrative    ** Merged History Encounter **            Current Outpatient Prescriptions   Medication Sig Dispense Refill    potassium chloride (KLOR-CON M10) 10 mEq tablet TAKE ONE TABLET BY MOUTH DAILY 90 Tab 3    ibuprofen (MOTRIN) 200 mg tablet Take 400 mg by mouth every six (6) hours as needed for Pain.  furosemide (LASIX) 40 mg tablet Take 1 Tab by mouth daily. 90 Tab 1    acetaminophen (TYLENOL) 325 mg tablet Take 325 mg by mouth as needed for Pain.  metoprolol tartrate (LOPRESSOR) 50 mg tablet Take 1 Tab by mouth two (2) times a day. 180 Tab 3    pravastatin (PRAVACHOL) 10 mg tablet Take 1 Tab by mouth daily. 90 Tab 3    citalopram (CELEXA) 10 mg tablet Take 1 Tab by mouth daily. 90 Tab 1    aspirin delayed-release 81 mg tablet Take 81 mg by mouth daily.  polyethylene glycol (MIRALAX) 17 gram packet Take 17 g by mouth daily.  ranitidine (ZANTAC) 150 mg tablet Take 1 Tab by mouth daily. 30 Tab 0       Allergies   Allergen Reactions    Avapro [Irbesartan] Other (comments)     Hypotension and dizziness    Crestor [Rosuvastatin] Itching and Myalgia    Pcn [Penicillins] Rash     Other reaction(s): hives    Plaquenil [Hydroxychloroquine] Rash    Quinine Other (comments)     Other reaction(s): other/intolerance  \"ringing in my ears\"  Ringing in ear       REVIEW OF SYSTEMS    Constitutional: Negative for fever, chills, or weight change. Respiratory: Negative for cough or shortness of breath. Cardiovascular: Negative for chest pain or palpitations. Gastrointestinal: Negative for acid reflux, change in bowel habits, or constipation.   Genitourinary: Negative for dysuria and flank pain. Musculoskeletal: Positive for lumbar and sacral pain. Skin: Negative for rash. Neurological: Negative for headaches, dizziness, or numbness. Endo/Heme/Allergies: Negative for increased bruising. Psychiatric/Behavioral: Negative for difficulty with sleep. PHYSICAL EXAMINATION  Visit Vitals    /81    Pulse 78    Temp 98.2 °F (36.8 °C)    Resp 18    Ht 5' 1\" (1.549 m)    Wt 128 lb (58.1 kg)    SpO2 95%    BMI 24.19 kg/m2       Constitutional: Awake, alert, and in no acute distress. HEENT: Normocephalic. Atraumatic. Oropharynx is moist and clear. PERRL. EOMI. Sclerae are nonicteric  Heart: Regular rate and rhythm  Lungs: Clear to auscultation bilaterally  Abdomen: Soft and nontender. Bowel sounds are present  Neurological: 1+ symmetrical DTRs in the upper extremities. 1+ symmetrical DTRs in the lower extremities. Sensation to light touch is intact. Negative Khushbu's sign bilaterally. Skin: warm, dry, and intact. Musculoskeletal: Decreased range of motion with side to side cervical flexion. Tenderness to palpation and very tight across the upper trapezius. Pt has scattered trigger points across the upper trapezius. Pt can abduct the right shoulder to 90 degrees. Negative Leda Alexanders' test or Neer's test on in the right. Tenderness to palpation in the right lower sacrum. Moderate pain with extension and axial loading. Pain with internal rotation of her left hip. Positive straight leg raise on the left. Patient ambulates with the assistance of a single point cane.       Biceps  Triceps Deltoids Wrist Ext Wrist Flex Hand Intrin   Right +4/5 +4/5 +4/5 +4/5 +4/5 +4/5   Left +4/5 +4/5 +4/5 +4/5 +4/5 +4/5      Hip Flex  Quads Hamstrings Ankle DF EHL Ankle PF   Right +4/5 +4/5 +4/5 +4/5 +4/5 +4/5   Left 4/5 4/5 4/5 +4/5 +4/5 +4/5     IMAGING:    Sacrum and coccyx x-rays from 12/02/2017 were personally reviewed with the patient and demonstrated:    Results from Hospital Encounter encounter on 12/02/17   XR SACRUM AND COCCYX   Narrative EXAM: Sacrum and coccyx x-ray    INDICATION: Fall with pain    TECHNIQUE: AP and lateral views of the sacrum and coccyx    COMPARISON: None    FINDINGS:   Degenerative changes which are moderate are noted in the sacroiliac joints with  subchondral sclerosis and osteophyte formation. No evidence of acute fracture  identified in the sacrum. There is a gap at the sacrococcygeal junction. The  edges are well-corticated. This is likely a chronic finding. Impression IMPRESSION:   1. No evidence of acute fracture or dislocation. 2.  Gap at the sacrococcygeal junction which is likely chronic in nature. 3.  Moderate degenerative changes of the SI joints        Lumbar spine x-rays from 10/02/2017 were personally reviewed with the patient and demonstrated:  FINDINGS: AP, lateral and spot lateral views of the lumbar spine were performed. There are 5 nonrib-bearing lumbar vertebral bodies. Mild degenerative disease at  L4-5 with anterior translation of L4 and L5 by 5 mm. There is moderate to severe  degenerative disease at L5-S1. No fractures are noted.     IMPRESSION:  1. Degenerative changes. 2. No fractures    Written by Darion Hidalgo, as dictated by Preet Malcolm MD.  I, Dr. Preet Malcolm confirm that all documentation is accurate.

## 2018-01-23 ENCOUNTER — HOSPITAL ENCOUNTER (OUTPATIENT)
Age: 83
Discharge: HOME | End: 2018-01-23
Attending: PHYSICAL MEDICINE & REHABILITATION
Payer: MEDICARE

## 2018-01-23 DIAGNOSIS — M53.3 SACRAL PAIN: ICD-10-CM

## 2018-01-23 DIAGNOSIS — R26.9 GAIT ABNORMALITY: ICD-10-CM

## 2018-01-23 DIAGNOSIS — M47.816 LUMBAR FACET ARTHROPATHY: ICD-10-CM

## 2018-01-23 DIAGNOSIS — M25.552 LEFT HIP PAIN: ICD-10-CM
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
## 2018-01-23 DIAGNOSIS — M62.838 MUSCLE SPASM: ICD-10-CM

## 2018-01-23 PROCEDURE — 72195 MRI PELVIS W/O DYE: CPT

## 2018-02-06 ENCOUNTER — OFFICE VISIT (OUTPATIENT)
Dept: FAMILY MEDICINE CLINIC | Age: 83
End: 2018-02-06

## 2018-02-06 ENCOUNTER — HOSPITAL ENCOUNTER (OUTPATIENT)
Dept: LAB | Age: 83
Discharge: HOME | End: 2018-02-06
Payer: MEDICARE

## 2018-02-06 VITALS
OXYGEN SATURATION: 96 % | HEART RATE: 70 BPM | SYSTOLIC BLOOD PRESSURE: 126 MMHG | TEMPERATURE: 98.3 F | RESPIRATION RATE: 12 BRPM | WEIGHT: 121 LBS | BODY MASS INDEX: 22.86 KG/M2 | DIASTOLIC BLOOD PRESSURE: 70 MMHG

## 2018-02-06 DIAGNOSIS — M79.604 PAIN IN BOTH LOWER EXTREMITIES: ICD-10-CM

## 2018-02-06 DIAGNOSIS — M79.605 PAIN IN BOTH LOWER EXTREMITIES: Primary | ICD-10-CM

## 2018-02-06 DIAGNOSIS — M79.604 PAIN IN BOTH LOWER EXTREMITIES: Primary | ICD-10-CM

## 2018-02-06 DIAGNOSIS — M79.605 PAIN IN BOTH LOWER EXTREMITIES: ICD-10-CM

## 2018-02-06 LAB
BASOPHILS # BLD: 0 K/UL (ref 0–0.06)
BASOPHILS NFR BLD: 1 % (ref 0–2)
DIFFERENTIAL METHOD BLD: ABNORMAL
EOSINOPHIL # BLD: 0.4 K/UL (ref 0–0.4)
EOSINOPHIL NFR BLD: 11 % (ref 0–5)
ERYTHROCYTE [DISTWIDTH] IN BLOOD BY AUTOMATED COUNT: 13.5 % (ref 11.6–14.5)
ERYTHROCYTE [SEDIMENTATION RATE] IN BLOOD: 10 MM/HR (ref 0–30)
HCT VFR BLD AUTO: 34.4 % (ref 35–45)
HGB BLD-MCNC: 11 G/DL (ref 12–16)
LYMPHOCYTES # BLD: 1.4 K/UL (ref 0.9–3.6)
LYMPHOCYTES NFR BLD: 36 % (ref 21–52)
MCH RBC QN AUTO: 29.4 PG (ref 24–34)
MCHC RBC AUTO-ENTMCNC: 32 G/DL (ref 31–37)
MCV RBC AUTO: 92 FL (ref 74–97)
MONOCYTES # BLD: 0.5 K/UL (ref 0.05–1.2)
MONOCYTES NFR BLD: 13 % (ref 3–10)
NEUTS SEG # BLD: 1.5 K/UL (ref 1.8–8)
NEUTS SEG NFR BLD: 39 % (ref 40–73)
PLATELET # BLD AUTO: 207 K/UL (ref 135–420)
PMV BLD AUTO: 9.1 FL (ref 9.2–11.8)
RBC # BLD AUTO: 3.74 M/UL (ref 4.2–5.3)
WBC # BLD AUTO: 3.8 K/UL (ref 4.6–13.2)

## 2018-02-06 PROCEDURE — 85652 RBC SED RATE AUTOMATED: CPT | Performed by: FAMILY MEDICINE

## 2018-02-06 PROCEDURE — 85025 COMPLETE CBC W/AUTO DIFF WBC: CPT | Performed by: FAMILY MEDICINE

## 2018-02-06 PROCEDURE — 80053 COMPREHEN METABOLIC PANEL: CPT | Performed by: FAMILY MEDICINE

## 2018-02-06 RX ORDER — PREDNISONE 10 MG/1
TABLET ORAL
Qty: 21 TAB | Refills: 0 | Status: SHIPPED | OUTPATIENT
Start: 2018-02-06 | End: 2018-06-04

## 2018-02-06 NOTE — PROGRESS NOTES
Patient c/o low back pain and pain in both legs. Patient has a bruise on both legs z 1 week she denies any injury or trauma. 1. Have you been to the ER, urgent care clinic since your last visit? Hospitalized since your last visit? No    2. Have you seen or consulted any other health care providers outside of the [address] [place] [address] since your last visit? Include any pap smears or colon screening.  No

## 2018-02-06 NOTE — PROGRESS NOTES
Petra Deal is a 80 y.o. female  presents for leg pain. It hurts. Allergies   Allergen Reactions    Avapro [Irbesartan] Other (comments)     Hypotension and dizziness    Crestor [Rosuvastatin] Itching and Myalgia    Pcn [Penicillins] Rash     Other reaction(s): hives    Plaquenil [Hydroxychloroquine] Rash    Quinine Other (comments)     Other reaction(s): other/intolerance  \"ringing in my ears\"  Ringing in ear     Outpatient Prescriptions Marked as Taking for the 2/6/18 encounter (Office Visit) with Fan Ruth MD   Medication Sig Dispense Refill    potassium chloride (KLOR-CON M10) 10 mEq tablet TAKE ONE TABLET BY MOUTH DAILY 90 Tab 3    ibuprofen (MOTRIN) 200 mg tablet Take 400 mg by mouth every six (6) hours as needed for Pain.  furosemide (LASIX) 40 mg tablet Take 1 Tab by mouth daily. 90 Tab 1    acetaminophen (TYLENOL) 325 mg tablet Take 325 mg by mouth as needed for Pain.  metoprolol tartrate (LOPRESSOR) 50 mg tablet Take 1 Tab by mouth two (2) times a day. 180 Tab 3    pravastatin (PRAVACHOL) 10 mg tablet Take 1 Tab by mouth daily. 90 Tab 3    citalopram (CELEXA) 10 mg tablet Take 1 Tab by mouth daily. 90 Tab 1    aspirin delayed-release 81 mg tablet Take 81 mg by mouth daily.  polyethylene glycol (MIRALAX) 17 gram packet Take 17 g by mouth daily.  ranitidine (ZANTAC) 150 mg tablet Take 1 Tab by mouth daily.  30 Tab 0     Patient Active Problem List   Diagnosis Code    Lupus erythematosus L93.0    Sjogren's syndrome ([facility] [address] M35.00    Chronic airway obstruction (HCC) J44.9    Hypertensive heart disease I11.9    Coronary atherosclerosis of native coronary artery I25.10    Lymphoma, non-Hodgkin's (HCC) C85.90    Hypercholesterolemia E78.00    Essential hypertension, benign I10    Left displaced femoral neck fracture (HCC) S72.002A    Diffuse large B-cell lymphoma of lymph nodes of multiple regions (HCC) C83.38    Pulmonary fibrosis (HCC) J84.10    Status post angioplasty with stent Z95.9    Mixed hyperlipidemia E78.2    Lupus (systemic lupus erythematosus) (HCC) M32.9    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 Gr 1 DDfx. Mild LVH. Mild MR.    Essential hypertension, benign     GERD (gastroesophageal reflux disease)     Headache(784.0)     History of myocardial perfusion scan 09/14/2012    No evidence of ischemia or infarction. Very mild apical thinning. EF 77%. No WMA. TID 1.46, suggests 3-vessel CAD. Intermediate to high risk pharm stress test due to TID.  Hypercholesterolemia     Lower extremity venous duplex 05/08/2013    Left leg:  No venous thrombosis or venous insufficiency.  Lupus erythematosus 1992    Lymphoma, non-Hodgkin's ([facility] [address] 5/2011    Low-grade being followed by Dr. Rubina Zhao without therapy currently    Mitral valve prolapse     10/2017    Pneumothorax 1981    Blebs in right lung with recurrent Pneumothorax    S/P cardiac cath 09/24/2012    Hvy calcification of coronary arteries. LM minimal.  mLAD 35%. oD2 70% (unchanged). LCX mild. pRCA mild. Minimal ISR of prev stent. LVEDP 14-16. EF 65%. No WMA.   Likely a falsely abnormal stress test.    Short-term memory loss     From fall in 2003    Sjogren's syndrome ([facility] [address] 1992    report of ROGELIO, SSA, RF positive    Traumatic subdural hematoma ([facility] [address] 2003    Head injury with subdural - s/p fall     Social History     Social History    Marital status:      Spouse name: N/A    Number of children: N/A    Years of education: N/A     Social History Main Topics    Smoking status: Former Smoker     Quit date: 1/1/1981    Smokeless tobacco: Never Used    Alcohol use Yes      Comment: once a month drinks wine    Drug use: No    Sexual activity: Not Currently     Other Topics Concern    None     Social History Narrative    ** Merged History Encounter **          Family History   Problem Relation Age of Onset    Cancer Father     Cancer Brother         Review of Systems   Constitutional: Negative for chills and fever. Cardiovascular: Negative for chest pain and palpitations. Gastrointestinal: Negative for constipation, diarrhea, nausea and vomiting. Skin: Negative for itching and rash. Vitals:    02/06/18 1522   BP: 126/70   Pulse: 70   Resp: 12   Temp: 98.3 °F (36.8 °C)   TempSrc: Oral   SpO2: 96%   Weight: 121 lb (54.9 kg)   Height: (P) 5' 1\" (1.549 m)   PainSc:   8   PainLoc: Leg       Physical Exam   Constitutional: She is oriented to person, place, and time and well-developed, well-nourished, and in no distress. Neck: Normal range of motion. Neck supple. Cardiovascular: Normal rate, regular rhythm and normal heart sounds. Pulmonary/Chest: Effort normal and breath sounds normal.   Musculoskeletal: Normal range of motion. She exhibits tenderness. She exhibits no edema. Neurological: She is alert and oriented to person, place, and time. Skin: Skin is warm and dry. Psychiatric: Mood, memory, affect and judgment normal.   Nursing note and vitals reviewed. Assessment/Plan      ICD-10-CM ICD-9-CM    1. Pain in both lower extremities M79.604 729.5 CBC WITH AUTOMATED DIFF    G91.395  METABOLIC PANEL, COMPREHENSIVE      SED RATE (ESR)      predniSONE (STERAPRED DS) 10 mg dose pack     Follow-up Disposition:  Return in about 6 months (around 8/6/2018).   lab results and schedule of future lab studies reviewed with patient    I have discussed the diagnosis with the patient and the intended plan of care as seen in the above orders. The patient has received an after-visit summary and questions were answered concerning future plans. I have discussed medication, side effects, and warnings with the patient in detail. The patient verbalized understanding and is in agreement with the plan of care. The patient will contact the office with any additional concerns.     Miguelangel Cervantes MD

## 2018-02-06 NOTE — MR AVS SNAPSHOT
[facility] [address] [place] [address] Road 
[phone] Patient: Joana Sheppard MRN: [id number] NWP:3/2/1835 Visit Information Date & Time Provider Department Dept. Phone Encounter #  
 2/6/2018  3:30 PM Miguelangel Cervantes MD [facility] [phone] [id number] Your Appointments 2/21/2018  5:30 PM  
ROUTINE CARE with Miguelangel Cervantes MD  
[facility]) Appt Note: 3mo f/u for medical conditions [facility] [address] [facility] [id number] [phone]  
  
   
 [address] [place], [place] [id number]  
  
    
 2/28/2018  8:30 AM  
DIAG TEST F/U with Chago Vigil MD  
[facility]) Appt Note: MRI F/U BRING DISK; MRI F/U [facility] DISK  
 [address] [place] [address]  
[phone]  
  
   
 [address] [facility],[address] [facility]  
  
    
 6/4/2018  3:00 PM  
Follow Up with Aldo Hagen DO [facility] ([facility]) Appt Note: 6 month follow up [facility] [address] [place] [id number] [phone] [place] [phone]  
  
    
 7/30/2018 12:30 PM  
Medicare Physical with MD Shiv [facility]) Appt Note: [facility]   
 [facility] [address] [place] [address]  
[address] [facility] [address] [place] [address] Upcoming Health Maintenance Date Due  
 GLAUCOMA SCREENING Q2Y 5/8/1996 MEDICARE YEARLY EXAM 6/27/2018 DTaP/Tdap/Td series (2 - Td) 6/26/2027 Allergies as of 2/6/2018  Review Complete On: 2/6/2018 By: Miguelangel Cervantes MD  
  
 Severity Noted Reaction Type Reactions Avapro [Irbesartan]  01/21/2011   Side Effect Other (comments) Hypotension and dizziness Crestor [Rosuvastatin]  01/21/2011   Side Effect Itching, Myalgia Pcn [Penicillins]  01/21/2011   Side Effect Rash Other reaction(s): hives Plaquenil [Hydroxychloroquine]  11/15/2016    Rash Quinine  01/21/2011   Side Effect Other (comments) Other reaction(s): other/intolerance \"ringing in my ears\" Ringing in ear Current Immunizations  Reviewed on 7/11/2016 Name Date Influenza High Dose Vaccine PF 11/1/2016 Influenza Vaccine 10/1/2010 Novel Influenza-H1N1-09, All Formulations 10/1/2010 Pneumococcal Polysaccharide (PPSV-23) 5/1/2010 Not reviewed this visit You Were Diagnosed With   
  
 Codes Comments Pain in both lower extremities    -  Primary ICD-10-CM: M79.604, M79.605 ICD-9-CM: 729.5 Vitals BP Pulse Temp Resp Height(growth percentile) Weight(growth percentile) 126/70 (BP 1 Location: Left arm, BP Patient Position: Sitting) 70 98.3 °F (36.8 °C) (Oral) 12 (P) 5' 1\" (1.549 m) 121 lb (54.9 kg) SpO2 BMI OB Status Smoking Status 96% (P) 22.86 kg/m2 Hysterectomy Former Smoker Vitals History BMI and BSA Data Body Mass Index Body Surface Area (P) 22.86 kg/m 2 (P) 1.54 m 2 Preferred Pharmacy Pharmacy Name Phone [facility] [address], [address] [facility] [phone] Your Updated Medication List  
  
   
This list is accurate as of: 2/6/18  3:46 PM.  Always use your most recent med list.  
  
  
  
  
 acetaminophen 325 mg tablet Commonly known as:  TYLENOL Take 325 mg by mouth as needed for Pain. aspirin delayed-release 81 mg tablet Take 81 mg by mouth daily. citalopram 10 mg tablet Commonly known as:  Drucilla Eng Take 1 Tab by mouth daily. furosemide 40 mg tablet Commonly known as:  LASIX Take 1 Tab by mouth daily. ibuprofen 200 mg tablet Commonly known as:  MOTRIN Take 400 mg by mouth every six (6) hours as needed for Pain.  
  
 metoprolol tartrate 50 mg tablet Commonly known as:  LOPRESSOR Take 1 Tab by mouth two (2) times a day. MIRALAX 17 gram packet Generic drug:  polyethylene glycol Take 17 g by mouth daily. potassium chloride 10 mEq tablet Commonly known as:  KLOR-CON M10  
TAKE ONE TABLET BY MOUTH DAILY pravastatin 10 mg tablet Commonly known as:  PRAVACHOL Take 1 Tab by mouth daily. predniSONE 10 mg dose pack Commonly known as:  STERAPRED DS See administration instruction per 10mg dose pack  
  
 raNITIdine 150 mg tablet Commonly known as:  ZANTAC Take 1 Tab by mouth daily. Prescriptions Sent to Pharmacy Refills  
 predniSONE (STERAPRED DS) 10 mg dose pack 0 Sig: See administration instruction per 10mg dose pack Class: Normal  
 Pharmacy: [id number] [address] #: [phone] To-Do List   
 02/06/2018 Lab:  CBC WITH AUTOMATED DIFF   
  
 02/06/2018 Lab:  METABOLIC PANEL, COMPREHENSIVE   
  
 02/06/2018 Lab:  SED RATE (ESR) Introducing [facility]! Tirso Monterroso introduces [employer] patient portal. Now you can access parts of your medical record, email your doctor's office, and request medication refills online. 1. In your internet browser, go to https://"[employer]. [employer]/"[employer] 2. Click on the First Time User? Click Here link in the Sign In box. You will see the New Member Sign Up page. 3. Enter your [employer] Access Code exactly as it appears below. You will not need to use this code after youve completed the sign-up process. If you do not sign up before the expiration date, you must request a new code. · [employer] Access Code: [email or web address] Expires: 4/15/2018  3:16 PM 
 
4.  Enter the last four digits of your Social Security Number (xxxx) and Date of Birth (mm/dd/yyyy) as indicated and click Submit. You will be taken to the next sign-up page. 5. Create a [employer] ID. This will be your [employer] login ID and cannot be changed, so think of one that is secure and easy to remember. 6. Create a [employer] password. You can change your password at any time. 7. Enter your Password Reset Question and Answer. This can be used at a later time if you forget your password. 8. Enter your e-mail address. You will receive e-mail notification when new information is available in [address] 9. Click Sign Up. You can now view and download portions of your medical record. 10. Click the Download Summary menu link to download a portable copy of your medical information. If you have questions, please visit the Frequently Asked Questions section of the [employer] website. Remember, [employer] is NOT to be used for urgent needs. For medical emergencies, dial 911. Now available from your iPhone and Android! Please provide this summary of care documentation to your next provider. Your primary care clinician is listed as [address]. If you have any questions after today's visit, please call [phone].

## 2018-02-07 LAB
ALBUMIN SERPL-MCNC: 3.9 G/DL (ref 3.4–5)
ALBUMIN/GLOB SERPL: 1.2 {RATIO} (ref 0.8–1.7)
ALP SERPL-CCNC: 65 U/L (ref 45–117)
ALT SERPL-CCNC: 12 U/L (ref 13–56)
ANION GAP SERPL CALC-SCNC: 8 MMOL/L (ref 3–18)
AST SERPL-CCNC: 19 U/L (ref 15–37)
BILIRUB SERPL-MCNC: 0.4 MG/DL (ref 0.2–1)
BUN SERPL-MCNC: 18 MG/DL (ref 7–18)
BUN/CREAT SERPL: 11 (ref 12–20)
CALCIUM SERPL-MCNC: 9.6 MG/DL (ref 8.5–10.1)
CHLORIDE SERPL-SCNC: 105 MMOL/L (ref 100–108)
CO2 SERPL-SCNC: 31 MMOL/L (ref 21–32)
CREAT SERPL-MCNC: 1.57 MG/DL (ref 0.6–1.3)
GLOBULIN SER CALC-MCNC: 3.3 G/DL (ref 2–4)
GLUCOSE SERPL-MCNC: 98 MG/DL (ref 74–99)
POTASSIUM SERPL-SCNC: 4.8 MMOL/L (ref 3.5–5.5)
PROT SERPL-MCNC: 7.2 G/DL (ref 6.4–8.2)
SODIUM SERPL-SCNC: 144 MMOL/L (ref 136–145)

## 2018-02-12 ENCOUNTER — ANESTHESIA EVENT (OUTPATIENT)
Dept: ENDOSCOPY | Age: 83
End: 2018-02-12
Payer: MEDICARE

## 2018-02-13 ENCOUNTER — HOSPITAL ENCOUNTER (OUTPATIENT)
Age: 83
Setting detail: OUTPATIENT SURGERY
Discharge: HOME | End: 2018-02-13
Attending: INTERNAL MEDICINE | Admitting: INTERNAL MEDICINE
Payer: MEDICARE

## 2018-02-13 ENCOUNTER — ANESTHESIA (OUTPATIENT)
Dept: ENDOSCOPY | Age: 83
End: 2018-02-13
Payer: MEDICARE

## 2018-02-13 VITALS
DIASTOLIC BLOOD PRESSURE: 75 MMHG | WEIGHT: 130.38 LBS | HEIGHT: 66 IN | SYSTOLIC BLOOD PRESSURE: 175 MMHG | TEMPERATURE: 96.8 F | RESPIRATION RATE: 18 BRPM | BODY MASS INDEX: 20.95 KG/M2 | OXYGEN SATURATION: 92 % | HEART RATE: 56 BPM

## 2018-02-13 PROCEDURE — 77030018846 HC SOL IRR STRL H20 ICUM -A: Performed by: INTERNAL MEDICINE

## 2018-02-13 PROCEDURE — 88305 TISSUE EXAM BY PATHOLOGIST: CPT | Performed by: INTERNAL MEDICINE

## 2018-02-13 PROCEDURE — 74011250636 HC RX REV CODE- 250/636: Performed by: NURSE ANESTHETIST, CERTIFIED REGISTERED

## 2018-02-13 PROCEDURE — 77030019988 HC FCPS ENDOSC DISP BSC -B: Performed by: INTERNAL MEDICINE

## 2018-02-13 PROCEDURE — 74011000250 HC RX REV CODE- 250

## 2018-02-13 PROCEDURE — 74011250636 HC RX REV CODE- 250/636

## 2018-02-13 PROCEDURE — 77030008565 HC TBNG SUC IRR ERBE -B: Performed by: INTERNAL MEDICINE

## 2018-02-13 PROCEDURE — 76040000019: Performed by: INTERNAL MEDICINE

## 2018-02-13 PROCEDURE — 76060000031 HC ANESTHESIA FIRST 0.5 HR: Performed by: INTERNAL MEDICINE

## 2018-02-13 RX ORDER — SODIUM CHLORIDE 0.9 % (FLUSH) 0.9 %
5-10 SYRINGE (ML) INJECTION AS NEEDED
Status: DISCONTINUED | OUTPATIENT
Start: 2018-02-13 | End: 2018-02-13 | Stop reason: HOSPADM

## 2018-02-13 RX ORDER — LIDOCAINE HYDROCHLORIDE 10 MG/ML
0.1 INJECTION, SOLUTION EPIDURAL; INFILTRATION; INTRACAUDAL; PERINEURAL AS NEEDED
Status: DISCONTINUED | OUTPATIENT
Start: 2018-02-13 | End: 2018-02-13 | Stop reason: HOSPADM

## 2018-02-13 RX ORDER — SODIUM CHLORIDE, SODIUM LACTATE, POTASSIUM CHLORIDE, CALCIUM CHLORIDE 600; 310; 30; 20 MG/100ML; MG/100ML; MG/100ML; MG/100ML
50 INJECTION, SOLUTION INTRAVENOUS CONTINUOUS
Status: DISCONTINUED | OUTPATIENT
Start: 2018-02-13 | End: 2018-02-13 | Stop reason: HOSPADM

## 2018-02-13 RX ORDER — PROPOFOL 10 MG/ML
INJECTION, EMULSION INTRAVENOUS AS NEEDED
Status: DISCONTINUED | OUTPATIENT
Start: 2018-02-13 | End: 2018-02-13 | Stop reason: HOSPADM

## 2018-02-13 RX ORDER — LIDOCAINE HYDROCHLORIDE 20 MG/ML
INJECTION, SOLUTION EPIDURAL; INFILTRATION; INTRACAUDAL; PERINEURAL AS NEEDED
Status: DISCONTINUED | OUTPATIENT
Start: 2018-02-13 | End: 2018-02-13 | Stop reason: HOSPADM

## 2018-02-13 RX ADMIN — PROPOFOL 20 MG: 10 INJECTION, EMULSION INTRAVENOUS at 10:30

## 2018-02-13 RX ADMIN — PROPOFOL 20 MG: 10 INJECTION, EMULSION INTRAVENOUS at 10:23

## 2018-02-13 RX ADMIN — LIDOCAINE HYDROCHLORIDE 20 MG: 20 INJECTION, SOLUTION EPIDURAL; INFILTRATION; INTRACAUDAL; PERINEURAL at 10:22

## 2018-02-13 RX ADMIN — LIDOCAINE HYDROCHLORIDE 60 MG: 20 INJECTION, SOLUTION EPIDURAL; INFILTRATION; INTRACAUDAL; PERINEURAL at 10:28

## 2018-02-13 RX ADMIN — SODIUM CHLORIDE, SODIUM LACTATE, POTASSIUM CHLORIDE, AND CALCIUM CHLORIDE: 600; 310; 30; 20 INJECTION, SOLUTION INTRAVENOUS at 10:18

## 2018-02-13 RX ADMIN — PROPOFOL 20 MG: 10 INJECTION, EMULSION INTRAVENOUS at 10:28

## 2018-02-13 RX ADMIN — SODIUM CHLORIDE, SODIUM LACTATE, POTASSIUM CHLORIDE, AND CALCIUM CHLORIDE 50 ML/HR: 600; 310; 30; 20 INJECTION, SOLUTION INTRAVENOUS at 09:44

## 2018-02-13 RX ADMIN — PROPOFOL 50 MG: 10 INJECTION, EMULSION INTRAVENOUS at 10:22

## 2018-02-13 NOTE — ANESTHESIA PREPROCEDURE EVALUATION
Anesthetic History   No history of anesthetic complications            Review of Systems / Medical History  Patient summary reviewed and pertinent labs reviewed    Pulmonary    COPD               Neuro/Psych   Within defined limits           Cardiovascular    Hypertension          CAD    Exercise tolerance: <4 METS     GI/Hepatic/Renal     GERD: well controlled           Endo/Other  Within defined limits           Other Findings   Comments: Documentation of current medication  Current medications obtained, documented and obtained? YES      Risk Factors for Postoperative nausea/vomiting:       History of postoperative nausea/vomiting? NO       Female? YES       Motion sickness? NO       Intended opioid administration for postoperative analgesia? NO      Smoking Abstinence:  Current Smoker? NO  Elective Surgery? YES  Seen preoperatively by anesthesiologist or proxy prior to day of surgery? YES  Pt abstained from smoking 24 hours prior to anesthesia?  N/A    Preventive care/screening for High Blood Pressure:  Aged 18 years and older: YES  Screened for high blood pressure: YES  Patients with high blood pressure referred to primary care provider   for BP management: YES                 Physical Exam    Airway  Mallampati: II  TM Distance: 4 - 6 cm  Neck ROM: normal range of motion   Mouth opening: Normal     Cardiovascular  Regular rate and rhythm,  S1 and S2 normal,  no murmur, click, rub, or gallop             Dental    Dentition: Edentulous     Pulmonary  Breath sounds clear to auscultation               Abdominal  GI exam deferred       Other Findings            Anesthetic Plan    ASA: 2  Anesthesia type: MAC          Induction: Intravenous  Anesthetic plan and risks discussed with: Patient

## 2018-02-13 NOTE — PROCEDURES
[email or web address]  [phone]        Brief Procedure Note    Cecelia Sullivan  5/8/1931  [id number]    Date of Procedure: 2/13/2018    Preoperative diagnosis: Dysphagia, unspecified type [R13.10]      Postoperative diagnosis: Gastric ulcers w antrum bxs; Non-obstructive dysphagia w dliation 46fr Eulis Sacclaire    Description of Findings: same    Sedation/Anesthesia: Monitored Anesthesia Care; See Anesthesia Note    Procedure: Procedure(s) with comments:  ENDOSCOPY W dilation w biopsies - 55 fr antony    :  Dr. Kristopher Caal MD    Assistant(s): Endoscopy Technician-1: Mary Prakash  Endoscopy RN-1: Carmel Xavier; Maday Aragon RN    EBL:None    Specimens:   ID Type Source Tests Collected by Time Destination   1 : Antrum bxs Preservative Stomach, Antrum  Kristopher Caal MD 2/13/2018 1025 Pathology       Findings: See printed and scanned procedure note    Complications: None    Dr. Kristopher Caal MD  2/13/2018  10:35 AM    Kristopher Caal MD  [facility] [address] [phone]  [email or web address]

## 2018-02-13 NOTE — ANESTHESIA POSTPROCEDURE EVALUATION
Post-Anesthesia Evaluation and Assessment    Patient: Noah Heimlich MRN: [id number]  SSN: [id number]    YOB: 1931  Age: 80 y.o. Sex: female       Cardiovascular Function/Vital Signs  Visit Vitals    /58    Pulse (!) 52    Temp 36.6 °C (97.9 °F)    Resp 17    Ht 5' 5.5\" (1.664 m)    Wt 59.1 kg (130 lb 6 oz)    SpO2 100%    BMI 21.37 kg/m2       Patient is status post MAC anesthesia for Procedure(s):  ENDOSCOPY W dilation w biopsies. Nausea/Vomiting: None    Postoperative hydration reviewed and adequate. Pain:  Pain Scale 1: Visual (02/13/18 1034)  Pain Intensity 1: 0 (02/13/18 1034)   Managed    Neurological Status:   Neuro (WDL): Within Defined Limits (02/13/18 1034)   At baseline    Mental Status and Level of Consciousness: Arousable    Pulmonary Status:   O2 Device: Nasal cannula (02/13/18 1039)   Adequate oxygenation and airway patent    Complications related to anesthesia: None    Post-anesthesia assessment completed.  No concerns    Signed By: Alondra Blanchard MD     February 13, 2018

## 2018-02-13 NOTE — H&P
[employer]  [phone]      History and Physical    Patient: Lidya Eckert MRN: [id number]  SSN: [id number]    YOB: 1931  Age: 80 y.o. Sex: female      Subjective:      Lidya Eckert is a 80 y.o. female who presents with frequent dysphagia and gagging with solid foods. Past Medical History:   Diagnosis Date    CAD (coronary artery disease)     Chronic    Chronic airway obstruction, not elsewhere classified     Frequent cough, wheezing secondary to lupus and COPD    Closed left hip fracture (HCC)     Decubitus ulcer of sacral region, stage 3 (HCC) 8/18/2016    Dyspnea on exertion     Echocardiogram 12/02/2010    EF 60-65%. Gr 1 DDfx. Mild LVH. Mild MR.    Essential hypertension, benign     GERD (gastroesophageal reflux disease)     Headache(784.0)     History of myocardial perfusion scan 09/14/2012    No evidence of ischemia or infarction. Very mild apical thinning. EF 77%. No WMA. TID 1.46, suggests 3-vessel CAD. Intermediate to high risk pharm stress test due to TID.  Hypercholesterolemia     Lower extremity venous duplex 05/08/2013    Left leg:  No venous thrombosis or venous insufficiency.  Lupus erythematosus 1992    Lymphoma, non-Hodgkin's ([facility] [address] 5/2011    Low-grade being followed by Dr. Rubina Zhao without therapy currently    Mitral valve prolapse     10/2017    Pneumothorax 1981    Blebs in right lung with recurrent Pneumothorax    S/P cardiac cath 09/24/2012    Hvy calcification of coronary arteries. LM minimal.  mLAD 35%. oD2 70% (unchanged). LCX mild. pRCA mild. Minimal ISR of prev stent. LVEDP 14-16. EF 65%. No WMA.   Likely a falsely abnormal stress test.    Short-term memory loss     From fall in 2003    Sjogren's syndrome ([facility] [address] 1992    report of ROGELIO, SSA, RF positive    Traumatic subdural hematoma ([facility] [address] 2003    Head injury with subdural - s/p fall     Past Surgical History:   Procedure Laterality Date    CARDIAC SURG PROCEDURE UNLIST      cardiac stent    CHEST SURGERY PROCEDURE UNLISTED  1981    RUL removal    HX ADENOIDECTOMY  11/2013    eyelids lifted    HX CATARACT REMOVAL Bilateral     w/ lens implants    HX CORONARY STENT PLACEMENT  8/21/2007    HX HEART CATHETERIZATION  9/24/2012    HX HEART CATHETERIZATION  8/21/2007    Dr. Satnam Geronimo [facility] - stent placed    HX HEENT  2003    subdural hematoma removed s/p fall    HX HYSTERECTOMY  1974    HX LOBECTOMY Right     upper portion    HX ORTHOPAEDIC  07/2016    Patial left hip replacement    HX PNEUMONECTOMY      partial    NEUROLOGICAL PROCEDURE UNLISTED      subdural hematoma      Family History   Problem Relation Age of Onset    Cancer Father     Cancer Brother      Social History   Substance Use Topics    Smoking status: Former Smoker     Quit date: 1/1/1981    Smokeless tobacco: Never Used    Alcohol use Yes      Comment: once a month drinks wine      Prior to Admission medications    Medication Sig Start Date End Date Taking? Authorizing Provider   potassium chloride (KLOR-CON M10) 10 mEq tablet TAKE ONE TABLET BY MOUTH DAILY 12/19/17  Yes Linwood Schaffer NP   furosemide (LASIX) 40 mg tablet Take 1 Tab by mouth daily. 8/28/17  Yes Ajay Leyva NP   acetaminophen (TYLENOL) 325 mg tablet Take 325 mg by mouth as needed for Pain. Yes Historical Provider   metoprolol tartrate (LOPRESSOR) 50 mg tablet Take 1 Tab by mouth two (2) times a day. 8/1/17  Yes Ajay Leyva NP   pravastatin (PRAVACHOL) 10 mg tablet Take 1 Tab by mouth daily. 8/1/17  Yes Ajay Leyva NP   ranitidine (ZANTAC) 150 mg tablet Take 1 Tab by mouth daily. 8/10/16  Yes Rama Clarke NP   predniSONE (STERAPRED DS) 10 mg dose pack See administration instruction per 10mg dose pack 2/6/18   Qamar Ventura MD   ibuprofen (MOTRIN) 200 mg tablet Take 400 mg by mouth every six (6) hours as needed for Pain.     Historical Provider   citalopram (CELEXA) 10 mg tablet Take 1 Tab by mouth daily. 6/30/17   Cassius Verdin MD   aspirin delayed-release 81 mg tablet Take 81 mg by mouth daily. Mekhi Vasquez MD   polyethylene glycol (MIRALAX) 17 gram packet Take 17 g by mouth daily. Historical Provider        Allergies   Allergen Reactions    Avapro [Irbesartan] Other (comments)     Hypotension and dizziness    Contrast Agent [Iodine] Other (comments)     Cant remember    Crestor [Rosuvastatin] Itching and Myalgia    Pcn [Penicillins] Rash     Other reaction(s): hives    Plaquenil [Hydroxychloroquine] Rash    Quinine Other (comments)     Other reaction(s): other/intolerance  \"ringing in my ears\"  Ringing in ear       Review of Systems:  A comprehensive review of systems was negative except for that written in the History of Present Illness. Objective:     Vitals:    02/09/18 1119 02/13/18 0921 02/13/18 0927   BP:   (!) 187/105   Pulse:   60   Resp:   16   Temp:   97.5 °F (36.4 °C)   SpO2:   100%   Weight: 58.5 kg (129 lb) 59.1 kg (130 lb 6 oz)    Height: 5' (1.524 m) 5' 5.5\" (1.664 m)         Physical Exam:  GENERAL: alert, cooperative, no distress, appears stated age  LUNG: clear to auscultation bilaterally  HEART: regular rate and rhythm, S1, S2 normal, no murmur, click, rub or gallop  ABDOMEN: soft, non-tender. Bowel sounds normal. No masses,  no organomegaly  NEUROLOGIC: alert & oriented x 3    Assessment:     1. Dysphagia    Plan:     1. EGD    Signed By: Naz Collado MD     February 13, 2018      Naz Collado MD  [facility], [address] [place] [address] - [phone]  [email or web address]

## 2018-02-13 NOTE — DISCHARGE INSTRUCTIONS
Esophageal Dilation: What to Expect at [address] [place] [address]  After you have esophageal dilation, you will stay at the hospital or surgery center for 1 to 2 hours. This will allow the medicine to wear off. You will be able to go home after your doctor or nurse checks to make sure you are not having any problems. This care sheet gives you a general idea about how long it will take for you to recover. But each person recovers at a different pace. Follow the steps below to get better as quickly as possible. How can you care for yourself at home? Activity  ? · Rest as much as you need to after you go home. ? · You should be able to go back to your usual activities the day after the procedure. Diet  ? · Follow your doctor's directions for eating after the procedure. ? · Drink plenty of fluids (unless your doctor has told you not to). Medicines  ? · Your doctor will tell you if and when you can restart your medicines. He or she will also give you instructions about taking any new medicines. ? · If you take blood thinners, such as warfarin (Coumadin), clopidogrel (Plavix), or aspirin, be sure to talk to your doctor. He or she will tell you if and when to start taking those medicines again. Make sure that you understand exactly what your doctor wants you to do. ? · If you have a sore throat the day after the procedure, use an over-the-counter spray to numb your throat. Sucking on throat lozenges and gargling with warm salt water may also help relieve your symptoms. Follow-up care is a key part of your treatment and safety. Be sure to make and go to all appointments, and call your doctor if you are having problems. It's also a good idea to know your test results and keep a list of the medicines you take. When should you call for help? Call 911 anytime you think you may need emergency care. For example, call if:  ? · You passed out (lost consciousness). ? · You have trouble breathing.    ? · Your stools are maroon or very bloody   ? Call your doctor now or seek immediate medical care if:  ? · You have new or worse belly pain. ? · You have a fever. ? · You have new or more blood in your stools. ? · You are sick to your stomach and cannot drink fluids. ? · You cannot pass stools or gas. ? · You have pain that does not get better after you take pain medicine. ? Watch closely for changes in your health, and be sure to contact your doctor if:  ? · Your throat still hurts after a day or two. ? · You do not get better as expected. Where can you learn more? Go to http://melvina-bridger.info/. Enter Z289 in the search box to learn more about \"Esophageal Dilation: What to Expect at Home. \"  Current as of: May 12, 2017  Content Version: 11.4  © 2866-6262 [employer]. Care instructions adapted under license by [employer] (which disclaims liability or warranty for this information). If you have questions about a medical condition or this instruction, always ask your healthcare professional. [address] any warranty or liability for your use of this information. Peptic Ulcer Disease: Care Instructions  Your Care Instructions    Peptic ulcers are sores on the inside of the stomach or the small intestine. They are usually caused by an infection with bacteria or from use of nonsteroidal anti-inflammatory drugs (NSAIDs). NSAIDs include aspirin, ibuprofen (Advil), and naproxen (Aleve). Your doctor may have prescribed medicine to reduce stomach acid. You also may need to take antibiotics if your peptic ulcers are caused by an infection. You can help your stomach heal and keep ulcers from coming back by making some changes in your lifestyle. Quit smoking, limit caffeine and alcohol, and reduce stress. Follow-up care is a key part of your treatment and safety. Be sure to make and go to all appointments, and call your doctor if you are having problems.  It's also a good idea to know your test results and keep a list of the medicines you take. How can you care for yourself at home? · Take your medicines exactly as prescribed. Call your doctor if you think you are having a problem with your medicine. · Do not take aspirin or other NSAIDs such as ibuprofen (Advil or Motrin) or naproxen (Aleve). Ask your doctor what you can take for pain. · Do not smoke. Smoking can make ulcers worse. If you need help quitting, talk to your doctor about stop-smoking programs and medicines. These can increase your chances of quitting for good. · Drink in moderation or avoid drinking alcohol. · Do not drink beverages that have caffeine if they bother your stomach. These include coffee, tea, and soda. · Eat a balanced diet of small, frequent meals. Make an appointment with a dietitian if you need help planning your meals. · Reduce stress. Avoid people and places that make you feel anxious, if you can. Learn ways to reduce stress, such as biofeedback, guided imagery, and meditation. When should you call for help? Call 911 anytime you think you may need emergency care. For example, call if:  ? · You vomit blood or what looks like coffee grounds. ? · You pass maroon or very bloody stools. ?Call your doctor now or seek immediate medical care if:  ? · You have new or worse belly pain. ? · Your stools are black and look like tar, or they have streaks of blood. ? · You are vomiting. ? Watch closely for changes in your health, and be sure to contact your doctor if:  ? · You do not feel better as expected. Where can you learn more? Go to http://melvina-bridger.info/. Enter X546 in the search box to learn more about \"Peptic Ulcer Disease: Care Instructions. \"  Current as of: May 12, 2017  Content Version: 11.4  © 7709-6728 [employer], [facility].  Care instructions adapted under license by [employer] (which disclaims liability or warranty for this information). If you have questions about a medical condition or this instruction, always ask your healthcare professional. [address] any warranty or liability for your use of this information. DISCHARGE SUMMARY from Nurse    PATIENT INSTRUCTIONS:    After general anesthesia or intravenous sedation, for 24 hours or while taking prescription Narcotics:  · Limit your activities  · Do not drive and operate hazardous machinery  · Do not make important personal or business decisions  · Do  not drink alcoholic beverages  · If you have not urinated within 8 hours after discharge, please contact your surgeon on call. Report the following to your surgeon:  · Excessive pain, swelling, redness or odor of or around the surgical area  · Temperature over 100.5  · Nausea and vomiting lasting longer than 4 hours or if unable to take medications  · Any signs of decreased circulation or nerve impairment to extremity: change in color, persistent  numbness, tingling, coldness or increase pain  · Any questions    *  Please give a list of your current medications to your Primary Care Provider. *  Please update this list whenever your medications are discontinued, doses are      changed, or new medications (including over-the-counter products) are added. *  Please carry medication information at all times in case of emergency situations. These are general instructions for a healthy lifestyle:    No smoking/ No tobacco products/ Avoid exposure to second hand smoke  Surgeon General's Warning:  Quitting smoking now greatly reduces serious risk to your health.     Obesity, smoking, and sedentary lifestyle greatly increases your risk for illness    A healthy diet, regular physical exercise & weight monitoring are important for maintaining a healthy lifestyle    You may be retaining fluid if you have a history of heart failure or if you experience any of the following symptoms:  Weight gain of 3 pounds or more overnight or 5 pounds in a week, increased swelling in our hands or feet or shortness of breath while lying flat in bed. Please call your doctor as soon as you notice any of these symptoms; do not wait until your next office visit. Recognize signs and symptoms of STROKE:    F-face looks uneven    A-arms unable to move or move unevenly    S-speech slurred or non-existent    T-time-call 911 as soon as signs and symptoms begin-DO NOT go       Back to bed or wait to see if you get better-TIME IS BRAIN. Warning Signs of HEART ATTACK     Call 911 if you have these symptoms:   Chest discomfort. Most heart attacks involve discomfort in the center of the chest that lasts more than a few minutes, or that goes away and comes back. It can feel like uncomfortable pressure, squeezing, fullness, or pain.  Discomfort in other areas of the upper body. Symptoms can include pain or discomfort in one or both arms, the back, neck, jaw, or stomach.  Shortness of breath with or without chest discomfort.  Other signs may include breaking out in a cold sweat, nausea, or lightheadedness. Don't wait more than five minutes to call 911 - MINUTES MATTER! Fast action can save your life. Calling 911 is almost always the fastest way to get lifesaving treatment. Emergency Medical Services staff can begin treatment when they arrive -- up to an hour sooner than if someone gets to the hospital by car. The discharge information has been reviewed with the patient and daughter. The patient and daughter verbalized understanding. Discharge medications reviewed with the patient and daughter and appropriate educational materials and side effects teaching were provided.   ___________________________________________________________________________________________________________________________________

## 2018-02-13 NOTE — PERIOP NOTES
Patient armband removed and shredded  Patient confirmed by two identifiers with discharge instructions prior to being provided to patient and daughters

## 2018-02-13 NOTE — IP AVS SNAPSHOT
[facility] 
 
 
 [address] Patient: Lidya Eckert MRN: [id number] DAP:[id number] About your hospitalization You were admitted on:  February 13, 2018 You last received care in the:  [facility] PHASE 2 RECOVERY You were discharged on:  February 13, 2018 Why you were hospitalized Your primary diagnosis was:  Not on File Follow-up Information Follow up With Details Comments Contact Info Oliver Torres [place]   [id number] [facility] [address] [place] 
[phone] Ham Renteria MD   [address] [place] [address] [address] 200 [facility] 
[phone] Your Scheduled Appointments Wednesday February 28, 2018  8:30 AM EST DIAG TEST F/U with Patria Martin MD  
[address] [facility], [address] and [facility]) [address] [facility] [address] [phone] Discharge Orders None A check emma indicates which time of day the medication should be taken. My Medications CONTINUE taking these medications Instructions Each Dose to Equal  
 Morning Noon Evening Bedtime  
 acetaminophen 325 mg tablet Commonly known as:  TYLENOL Your last dose was: Your next dose is: Take 325 mg by mouth as needed for Pain. 325 mg  
    
   
   
   
  
 aspirin delayed-release 81 mg tablet Your last dose was: Your next dose is: Take 81 mg by mouth daily. 81 mg  
    
   
   
   
  
 citalopram 10 mg tablet Commonly known as:  Nobie Bumps Your last dose was: Your next dose is: Take 1 Tab by mouth daily. 10 mg  
    
   
   
   
  
 furosemide 40 mg tablet Commonly known as:  LASIX Your last dose was: Your next dose is: Take 1 Tab by mouth daily. 40 mg  
    
   
   
   
  
 ibuprofen 200 mg tablet Commonly known as:  MOTRIN Your last dose was: Your next dose is: Take 400 mg by mouth every six (6) hours as needed for Pain. 400 mg  
    
   
   
   
  
 metoprolol tartrate 50 mg tablet Commonly known as:  LOPRESSOR Your last dose was: Your next dose is: Take 1 Tab by mouth two (2) times a day. 50 mg MIRALAX 17 gram packet Generic drug:  polyethylene glycol Your last dose was: Your next dose is: Take 17 g by mouth daily. 17 g  
    
   
   
   
  
 potassium chloride 10 mEq tablet Commonly known as:  KLOR-CON M10 Your last dose was: Your next dose is: TAKE ONE TABLET BY MOUTH DAILY pravastatin 10 mg tablet Commonly known as:  PRAVACHOL Your last dose was: Your next dose is: Take 1 Tab by mouth daily. 10 mg  
    
   
   
   
  
 predniSONE 10 mg dose pack Commonly known as:  STERAPRED DS Your last dose was: Your next dose is:    
   
   
 See administration instruction per 10mg dose pack  
     
   
   
   
  
 raNITIdine 150 mg tablet Commonly known as:  ZANTAC Your last dose was: Your next dose is: Take 1 Tab by mouth daily. 150 mg Discharge Instructions Esophageal Dilation: What to Expect at [facility] Your Recovery After you have esophageal dilation, you will stay at the hospital or surgery center for 1 to 2 hours. This will allow the medicine to wear off. You will be able to go home after your doctor or nurse checks to make sure you are not having any problems. This care sheet gives you a general idea about how long it will take for you to recover. But each person recovers at a different pace. Follow the steps below to get better as quickly as possible. How can you care for yourself at home? Activity ? · Rest as much as you need to after you go home. ? · You should be able to go back to your usual activities the day after the procedure. Diet ? · Follow your doctor's directions for eating after the procedure. ? · Drink plenty of fluids (unless your doctor has told you not to). Medicines ? · Your doctor will tell you if and when you can restart your medicines. He or she will also give you instructions about taking any new medicines. ? · If you take blood thinners, such as warfarin (Coumadin), clopidogrel (Plavix), or aspirin, be sure to talk to your doctor. He or she will tell you if and when to start taking those medicines again. Make sure that you understand exactly what your doctor wants you to do. ? · If you have a sore throat the day after the procedure, use an over-the-counter spray to numb your throat. Sucking on throat lozenges and gargling with warm salt water may also help relieve your symptoms. Follow-up care is a key part of your treatment and safety. Be sure to make and go to all appointments, and call your doctor if you are having problems. It's also a good idea to know your test results and keep a list of the medicines you take. When should you call for help? Call 911 anytime you think you may need emergency care. For example, call if: 
? · You passed out (lost consciousness). ? · You have trouble breathing. ? · Your stools are maroon or very bloody ? Call your doctor now or seek immediate medical care if: 
? · You have new or worse belly pain. ? · You have a fever. ? · You have new or more blood in your stools. ? · You are sick to your stomach and cannot drink fluids. ? · You cannot pass stools or gas. ? · You have pain that does not get better after you take pain medicine. ? Watch closely for changes in your health, and be sure to contact your doctor if: 
? · Your throat still hurts after a day or two. ? · You do not get better as expected. Where can you learn more? Go to http://melvina-bridger.info/. Enter N441 in the search box to learn more about \"Esophageal Dilation: What to Expect at Home. \" Current as of: May 12, 2017 Content Version: 11.4 © 4430-7441 [employer]. Care instructions adapted under license by [employer] (which disclaims liability or warranty for this information). If you have questions about a medical condition or this instruction, always ask your healthcare professional. [place] [address] any warranty or liability for your use of this information. Peptic Ulcer Disease: Care Instructions Your Care Instructions Peptic ulcers are sores on the inside of the stomach or the small intestine. They are usually caused by an infection with bacteria or from use of nonsteroidal anti-inflammatory drugs (NSAIDs). NSAIDs include aspirin, ibuprofen (Advil), and naproxen (Aleve). Your doctor may have prescribed medicine to reduce stomach acid. You also may need to take antibiotics if your peptic ulcers are caused by an infection. You can help your stomach heal and keep ulcers from coming back by making some changes in your lifestyle. Quit smoking, limit caffeine and alcohol, and reduce stress. Follow-up care is a key part of your treatment and safety. Be sure to make and go to all appointments, and call your doctor if you are having problems. It's also a good idea to know your test results and keep a list of the medicines you take. How can you care for yourself at home? · Take your medicines exactly as prescribed. Call your doctor if you think you are having a problem with your medicine. · Do not take aspirin or other NSAIDs such as ibuprofen (Advil or Motrin) or naproxen (Aleve). Ask your doctor what you can take for pain. · Do not smoke. Smoking can make ulcers worse. If you need help quitting, talk to your doctor about stop-smoking programs and medicines.  These can increase your chances of quitting for good. · Drink in moderation or avoid drinking alcohol. · Do not drink beverages that have caffeine if they bother your stomach. These include coffee, tea, and soda. · Eat a balanced diet of small, frequent meals. Make an appointment with a dietitian if you need help planning your meals. · Reduce stress. Avoid people and places that make you feel anxious, if you can. Learn ways to reduce stress, such as biofeedback, guided imagery, and meditation. When should you call for help? Call 911 anytime you think you may need emergency care. For example, call if: 
? · You vomit blood or what looks like coffee grounds. ? · You pass maroon or very bloody stools. ?Call your doctor now or seek immediate medical care if: 
? · You have new or worse belly pain. ? · Your stools are black and look like tar, or they have streaks of blood. ? · You are vomiting. ? Watch closely for changes in your health, and be sure to contact your doctor if: 
? · You do not feel better as expected. Where can you learn more? Go to http://melvina-bridger.info/. Enter F589 in the search box to learn more about \"Peptic Ulcer Disease: Care Instructions. \" Current as of: May 12, 2017 Content Version: 11.4 © 2823-9741 [employer]. Care instructions adapted under license by [employer] (which disclaims liability or warranty for this information). If you have questions about a medical condition or this instruction, always ask your healthcare professional. [place] [address] any warranty or liability for your use of this information. DISCHARGE SUMMARY from Nurse PATIENT INSTRUCTIONS: 
 
 
F-face looks uneven A-arms unable to move or move unevenly S-speech slurred or non-existent T-time-call 911 as soon as signs and symptoms begin-DO NOT go  
 Back to bed or wait to see if you get better-TIME IS BRAIN. Warning Signs of HEART ATTACK Call 911 if you have these symptoms: 
? Chest discomfort. Most heart attacks involve discomfort in the center of the chest that lasts more than a few minutes, or that goes away and comes back. It can feel like uncomfortable pressure, squeezing, fullness, or pain. ? Discomfort in other areas of the upper body. Symptoms can include pain or discomfort in one or both arms, the back, neck, jaw, or stomach. ? Shortness of breath with or without chest discomfort. ? Other signs may include breaking out in a cold sweat, nausea, or lightheadedness. Don't wait more than five minutes to call [address]! Fast action can save your life. Calling 911 is almost always the fastest way to get lifesaving treatment. Emergency Medical Services staff can begin treatment when they arrive  up to an hour sooner than if someone gets to the hospital by car. The discharge information has been reviewed with the patient and daughter. The patient and daughter verbalized understanding. Discharge medications reviewed with the patient and daughter and appropriate educational materials and side effects teaching were provided. ___________________________________________________________________________________________________________________________________ ACO Transitions of Care Introducing [employer] [address] offers a voluntary care coordination program to provide high quality service and care to [facility] fee-for-service beneficiaries. Ranulfo Donnelly was designed to help you enhance your health and well-being through the following services: ? Transitions of Care  support for individuals who are transitioning from one care setting to another (example: Hospital to home). ?  Chronic and Complex Care Coordination  support for individuals and caregivers of those with serious or chronic illnesses or with more than one chronic (ongoing) condition and those who take a number of different medications. If you meet specific medical criteria, a [facility] [address] may call you directly to coordinate your care with your primary care physician and your other care providers. For questions about the [facility] programs, please, contact your physicians office. For general questions or additional information about Accountable Care Organizations: 
Please visit www.medicare.gov/acos. html or call 1-800-MEDICARE (6-680.785.6420) TTY users should call 8-287.424.6884. Introducing [facility]! Miriamluis alfredo [place] introduces Vubiquity patient portal. Now you can access parts of your medical record, email your doctor's office, and request medication refills online. 1. In your internet browser, go to [email or web address]/Tru [employer] Corpt 2. Click on the First Time User? Click Here link in the Sign In box. You will see the New Member Sign Up page. 3. Enter your Vubiquity Access Code exactly as it appears below. You will not need to use this code after youve completed the sign-up process. If you do not sign up before the expiration date, you must request a new code. · Vubiquity Access Code: [email or web address] Expires: 4/15/2018  3:16 PM 
 
4. Enter the last four digits of your Social Security Number (xxxx) and Date of Birth (mm/dd/yyyy) as indicated and click Submit. You will be taken to the next sign-up page. 5. Create a [employer] ID. This will be your Vubiquity login ID and cannot be changed, so think of one that is secure and easy to remember. 6. Create a [employer] password. You can change your password at any time. 7. Enter your Password Reset Question and Answer. This can be used at a later time if you forget your password. 8. Enter your e-mail address.  You will receive e-mail notification when new information is available in Vigo. 9. Click Sign Up. You can now view and download portions of your medical record. 10. Click the Download Summary menu link to download a portable copy of your medical information. If you have questions, please visit the Frequently Asked Questions section of the Vigo website. Remember, Vigo is NOT to be used for urgent needs. For medical emergencies, dial 911. Now available from your iPhone and Android! Providers Seen During Your Hospitalization Provider Specialty Primary office phone Magaly Looney MD Gastroenterology [phone] Your Primary Care Physician (PCP) Primary Care Physician Office Phone Office Fax Pratima Leese [phone] You are allergic to the following Allergen Reactions Avapro (Irbesartan) Other (comments) Hypotension and dizziness Contrast Agent (Iodine) Other (comments) Cant remember Crestor (Rosuvastatin) Itching Myalgia Pcn (Penicillins) Rash Other reaction(s): hives Plaquenil (Hydroxychloroquine) Rash Quinine Other (comments) Other reaction(s): other/intolerance \"ringing in my ears\" Ringing in ear Recent Documentation Height Weight BMI OB Status Smoking Status 1.664 m 59.1 kg 21.37 kg/m2 Hysterectomy Former Smoker Emergency Contacts Name Discharge Info Relation Home Work [employer] DISCHARGE CAREGIVER [3] Daughter [21] [phone] [facility] DISCHARGE CAREGIVER [3] Daughter [21] [phone] Patient Belongings The following personal items are in your possession at time of discharge: 
  Dental Appliances: At home  Visual Aid: Glasses Please provide this summary of care documentation to your next provider.  
  
  
 
  
Signatures-by signing, you are acknowledging that this After Visit Summary has been reviewed with you and you have received a copy. Patient Signature:  ____________________________________________________________ Date:  ____________________________________________________________  
  
Maryam Kenji Provider Signature:  ____________________________________________________________ Date:  ____________________________________________________________

## 2018-02-13 NOTE — TELEPHONE ENCOUNTER
This patient contacted office for the following prescriptions to be filled:    Medication requested :   Requested Prescriptions     Pending Prescriptions Disp Refills    citalopram (CELEXA) 10 mg tablet 90 Tab 1     Sig: Take 1 Tab by mouth daily.    (This was not previously prescribed by Dr. Elaina Copeland)  PCP: Dr. Phoebe Nice or Print: [facility]   Mail order or Local pharmacy: [phone]    Scheduled appointment if not seen by current providers in office: LOV 2/6/18, next appt 8/8/18

## 2018-02-14 RX ORDER — CITALOPRAM 10 MG/1
10 TABLET ORAL DAILY
Qty: 90 TAB | Refills: 1 | Status: SHIPPED | OUTPATIENT
Start: 2018-02-14 | End: 2018-08-09 | Stop reason: SDUPTHER

## 2018-02-28 ENCOUNTER — OFFICE VISIT (OUTPATIENT)
Dept: ORTHOPEDIC SURGERY | Age: 83
End: 2018-02-28

## 2018-02-28 VITALS
HEART RATE: 78 BPM | DIASTOLIC BLOOD PRESSURE: 74 MMHG | TEMPERATURE: 97.5 F | RESPIRATION RATE: 16 BRPM | OXYGEN SATURATION: 98 % | WEIGHT: 130 LBS | HEIGHT: 66 IN | SYSTOLIC BLOOD PRESSURE: 130 MMHG | BODY MASS INDEX: 20.89 KG/M2

## 2018-02-28 DIAGNOSIS — M53.3 SACRAL PAIN: Primary | ICD-10-CM

## 2018-02-28 DIAGNOSIS — Z86.19 HISTORY OF SHINGLES: ICD-10-CM

## 2018-02-28 DIAGNOSIS — G89.29 OTHER CHRONIC PAIN: ICD-10-CM

## 2018-02-28 DIAGNOSIS — M79.2 NEURITIS: ICD-10-CM

## 2018-02-28 RX ORDER — PANTOPRAZOLE SODIUM 40 MG/1
40 TABLET, DELAYED RELEASE ORAL DAILY
COMMUNITY
End: 2018-06-04

## 2018-02-28 RX ORDER — LIDOCAINE 50 MG/G
OINTMENT TOPICAL
Qty: 120 G | Refills: 0 | Status: SHIPPED | OUTPATIENT
Start: 2018-02-28

## 2018-02-28 NOTE — PROGRESS NOTES
[facility]  [address], [facility] [address]  Phone: [phone]  Fax: [phone]    Pt's YOB: 1931    ASSESSMENT   Diagnoses and all orders for this visit:    1. Sacral pain  -     lidocaine (XYLOCAINE) 5 % ointment; Apply  2-3 inches to affected area 3-4 times a day    2. Neuritis    3. Other chronic pain  -     lidocaine (XYLOCAINE) 5 % ointment; Apply  2-3 inches to affected area 3-4 times a day    4. History of shingles  -     lidocaine (XYLOCAINE) 5 % ointment; Apply  2-3 inches to affected area 3-4 times a day         IMPRESSION AND PLAN:  Thais Gurrola is a 80 y.o. female with history of lumbar and coccyx pain. She continues to experience lower back and coccyx pain. Pt was referred to physical therapy by her PCP, Dr. Franci Paulson, but was unable to attend sessions due to her blood pressure. She has experienced minimal relief with Lidoderm patches in the past and is no longer taking ibuprofen. 1) Pt will be prescribed a compound cream pending further information about local compounding pharmacy  2) She was also prescribed Lidocaine ointment. 3) Ms. Monica Fortune has a reminder for a \"due or due soon\" health maintenance. I have asked that she contact her primary care provider, Arun Olivarez MD, for follow-up on this health maintenance. 4)  demonstrated consistency with prescribing. 5) Pt will follow-up in 6 weeks or sooner if needed. 6) Consider a coccyx block if pain persists. HISTORY OF PRESENT ILLNESS:  Thais Gurrola is a 80 y.o. female with history of lumbar and coccyx pain. She presents to the office today for pelvic MRI follow up. She continues to experience lower back and coccyx pain. She is unable to tell which maneuvers or positions cause her more pain and reports that her pain is constant.  Of note, Pt fell about 1.5 years ago for unknown reasons, fractured her left hip with the fall, had a partial left hip replacement, went to rehab, and developed almost a stage 4 bedsore. Pt reports experiencing the onset of pain while recovering from the bed sore. She admits to weakness in the right leg and admits that she originally experienced weakness in the left leg. Pt denies any constipation or diarrhea, reports that she generally has a bowel movement at least twice a week, and states that she is eating well. Pt was referred to physical therapy by her PCP, Dr. Jaqueline Maldonado, but was unable to attend sessions due to her blood pressure. She no longer takes ibuprofen and tried Neurontin in the past with out benefit. Pt has experienced minimal relief with Lidoderm patches in the past. She has been using OTC Aspercreme with Lidocaine patches. Pt has also tried [employer]fm and [employer]. Of note, patient has a history of shingles. Pt at this time desires to proceed with medication evaluation. Pain Scale: 6/10    PCP: Lidia Panda MD     Past Medical History:   Diagnosis Date    CAD (coronary artery disease)     Chronic    Chronic airway obstruction, not elsewhere classified     Frequent cough, wheezing secondary to lupus and COPD    Closed left hip fracture (HCC)     Decubitus ulcer of sacral region, stage 3 ([address] 8/18/2016    Dyspnea on exertion     Echocardiogram 12/02/2010    EF 60-65%. Gr 1 DDfx. Mild LVH. Mild MR.    Essential hypertension, benign     GERD (gastroesophageal reflux disease)     Headache(784.0)     History of myocardial perfusion scan 09/14/2012    No evidence of ischemia or infarction. Very mild apical thinning. EF 77%. No WMA. TID 1.46, suggests 3-vessel CAD. Intermediate to high risk pharm stress test due to TID.  Hypercholesterolemia     Lower extremity venous duplex 05/08/2013    Left leg:  No venous thrombosis or venous insufficiency.     Lupus erythematosus 1992    Lymphoma, non-Hodgkin's ([address] 5/2011    Low-grade being followed by Dr. Antonia Flowers without therapy currently    Mitral valve prolapse     10/2017    Pneumothorax 1981    Blebs in right lung with recurrent Pneumothorax    S/P cardiac cath 09/24/2012    Hvy calcification of coronary arteries. LM minimal.  mLAD 35%. oD2 70% (unchanged). LCX mild. pRCA mild. Minimal ISR of prev stent. LVEDP 14-16. EF 65%. No WMA. Likely a falsely abnormal stress test.    Short-term memory loss     From fall in 2003    Sjogren's syndrome ([address] 1992    report of ROGELIO, SSA, RF positive    Traumatic subdural hematoma ([address] 2003    Head injury with subdural - s/p fall        Social History     Social History    Marital status:      Spouse name: N/A    Number of children: N/A    Years of education: N/A     Occupational History    Not on file. Social History Main Topics    Smoking status: Former Smoker     Quit date: 1/1/1981    Smokeless tobacco: Never Used    Alcohol use Yes      Comment: once a month drinks wine    Drug use: No    Sexual activity: Not Currently     Other Topics Concern    Not on file     Social History Narrative    ** Merged History Encounter **            Current Outpatient Prescriptions   Medication Sig Dispense Refill    pantoprazole (PROTONIX) 40 mg tablet Take 40 mg by mouth daily.  lidocaine (XYLOCAINE) 5 % ointment Apply  2-3 inches to affected area 3-4 times a day 120 g 0    citalopram (CELEXA) 10 mg tablet Take 1 Tab by mouth daily. 90 Tab 1    potassium chloride (KLOR-CON M10) 10 mEq tablet TAKE ONE TABLET BY MOUTH DAILY 90 Tab 3    furosemide (LASIX) 40 mg tablet Take 1 Tab by mouth daily. 90 Tab 1    acetaminophen (TYLENOL) 325 mg tablet Take 325 mg by mouth as needed for Pain.  metoprolol tartrate (LOPRESSOR) 50 mg tablet Take 1 Tab by mouth two (2) times a day. 180 Tab 3    pravastatin (PRAVACHOL) 10 mg tablet Take 1 Tab by mouth daily. 90 Tab 3    aspirin delayed-release 81 mg tablet Take 81 mg by mouth daily.       polyethylene glycol (MIRALAX) 17 gram packet Take 17 g by mouth daily.  ranitidine (ZANTAC) 150 mg tablet Take 1 Tab by mouth daily. 30 Tab 0    predniSONE (STERAPRED DS) 10 mg dose pack See administration instruction per 10mg dose pack 21 Tab 0    ibuprofen (MOTRIN) 200 mg tablet Take 400 mg by mouth every six (6) hours as needed for Pain. Allergies   Allergen Reactions    Avapro [Irbesartan] Other (comments)     Hypotension and dizziness    Contrast Agent [Iodine] Other (comments)     Cant remember    Crestor [Rosuvastatin] Itching and Myalgia    Pcn [Penicillins] Rash     Other reaction(s): hives    Plaquenil [Hydroxychloroquine] Rash    Quinine Other (comments)     Other reaction(s): other/intolerance  \"ringing in my ears\"  Ringing in ear         REVIEW OF SYSTEMS    Constitutional: Negative for fever, chills, or weight change. Respiratory: Negative for cough or shortness of breath. Cardiovascular: Negative for chest pain or palpitations. Gastrointestinal: Negative for acid reflux, change in bowel habits, or constipation. Genitourinary: Negative for dysuria and flank pain. Musculoskeletal: Positive for sacral pain. Skin: Negative for rash. Neurological: Negative for headaches, dizziness, or numbness. Endo/Heme/Allergies: Negative for increased bruising. Psychiatric/Behavioral: Negative for difficulty with sleep. PHYSICAL EXAMINATION  Visit Vitals    /74    Pulse 78    Temp 97.5 °F (36.4 °C) (Oral)    Resp 16    Ht 5' 5.5\" (1.664 m)    Wt 130 lb (59 kg)    SpO2 98%    BMI 21.3 kg/m2       Constitutional: Awake, alert, and in no acute distress. Neurological: 1+ symmetrical DTRs in the lower extremities. Sensation to light touch is intact. Skin: warm, dry, and intact. Musculoskeletal: Pain with palpation in the lower sacrum region. No pain with extension, axial loading, or forward flexion. No pain with internal or external rotation of her hips.  Negative straight leg raise bilaterally. Patient ambulates with the assistance of a single point cane. Hip Flex  Quads Hamstrings Ankle DF EHL Ankle PF   Right +4/5 +4/5 +4/5 +4/5 +4/5 +4/5   Left +4/5 +4/5 +4/5 +4/5 +4/5 +4/5     IMAGING:    Pelvic MRI from 01/23/2018 was personally reviewed with the patient and demonstrated:    Results from [place] encounter on 01/23/18   MRI PELV WO CONT   Narrative MRI pelvis without contrast-MRI of the sacrum    INDICATION: Sacral pain, fall sequela, lumbar facet arthropathy    COMPARISON: Radiographs 12/2/2017    TECHNIQUE: T1 weighted and T2-weighted with fat-sat duration MRI images were  obtained in multiple planes of the sacrum. FINDINGS:    Partially imaged left hip prosthesis with mild susceptibility artifact and field  inhomogeneity partially limiting the evaluation. No hip joint effusions  appreciated. The sacrum is normal in signal without evidence for fracture or suspicious bone  marrow lesion. Bilateral SI joints are symmetric without evidence for edema or  erosions. The sacral canal is patent. The sacral neural foramina are patent. There is 1.5 cm Tarlov cyst at the tip of the thecal sac. There is about 1 cm gap within the proximal coccyx near the sacrococcygeal  junction with associated intermediate to low T1 and T2 signal intensity  indicating scarring in the gap. No associated bone marrow edema. Trace regional  soft tissue edema at the tip of the sacrum suggesting inflammation but no fluid  collection. Incidentally noted degenerative disc disease L5/S1. Partially imaged intrapelvic structures demonstrate no evidence for free fluid  or adenopathy. Impression IMPRESSION:    1. No sacral fracture or destructive osseous lesion. 2. Chronic displaced fracture of the proximal coccyx near the sacrococcygeal  junction. Trace regional soft tissue edema suggesting trace inflammation.           Lumbar spine x-rays from 10/02/2017 were personally reviewed with the patient and demonstrated:  FINDINGS: AP, lateral and spot lateral views of the lumbar spine were performed. There are 5 nonrib-bearing lumbar vertebral bodies. Mild degenerative disease at  L4-5 with anterior translation of L4 and L5 by 5 mm. There is moderate to severe  degenerative disease at L5-S1. No fractures are noted.      IMPRESSION:  1. Degenerative changes. 2. No fractures    Written by Robert Jones, as dictated by Paola Chan MD.  I, Dr. Paola Chan confirm that all documentation is accurate.

## 2018-02-28 NOTE — MR AVS SNAPSHOT
[address] [facility] [address] [facility] [address] 
[phone] Patient: Lucy Resendez MRN: [id number] VDT:9/4/2194 Visit Information Date & Time Provider Department Dept. Phone Encounter #  
 2/28/2018  8:30 AM Duane Ensign, MD [facility]  Follow-up Instructions Return in about 6 weeks (around 4/11/2018) for Medication follow up. Routing History Your Appointments 6/4/2018  3:00 PM  
Follow Up with Rhianna Obando DO [facility] ([address]) Appt Note: 6 month follow up [place] [phone]  
[phone] [phone]  
  
    
 7/30/2018 12:30 PM  
Medicare Physical with Darleen Herrera MD  
[facility] [address] [facility]) Appt Note: [facility]   
 [facility] [address] [facility] [address]  
  
    
 8/8/2018  5:00 PM  
FOLLOW UP EXAM with Annalee Heard MD  
[facility] ([address]) Appt Note: 6 month f/u  
 [address] [facility] [address] [place] [address]  
[phone]  
  
   
 [facility] [address] [place] [address] Upcoming Health Maintenance Date Due  
 GLAUCOMA SCREENING Q2Y 5/8/1996 MEDICARE YEARLY EXAM 6/27/2018 DTaP/Tdap/Td series (2 - Td) 6/26/2027 Allergies as of 2/28/2018  Review Complete On: 2/28/2018 By: Duane Ensign, MD  
  
 Severity Noted Reaction Type Reactions Avapro [Irbesartan]  01/21/2011   Side Effect Other (comments) Hypotension and dizziness Contrast Agent [Iodine]  02/09/2018    Other (comments) Cant remember Crestor [Rosuvastatin]  01/21/2011   Side Effect Itching, Myalgia Pcn [Penicillins]  01/21/2011   Side Effect Rash Other reaction(s): hives Plaquenil [Hydroxychloroquine]  11/15/2016    Rash Quinine  01/21/2011   Side Effect Other (comments) Other reaction(s): other/intolerance \"ringing in my ears\" Ringing in ear Current Immunizations  Reviewed on 7/11/2016 Name Date Influenza High Dose Vaccine PF 11/1/2016 Influenza Vaccine 10/1/2010 Novel Influenza-H1N1-09, All Formulations 10/1/2010 Pneumococcal Polysaccharide (PPSV-23) 5/1/2010 Not reviewed this visit You Were Diagnosed With   
  
 Codes Comments Sacral pain    -  Primary ICD-10-CM: M53.3 ICD-9-CM: 724.6 Neuritis     ICD-10-CM: M79.2 ICD-9-CM: 729.2 Other chronic pain     ICD-10-CM: G89.29 ICD-9-CM: 338.29 Vitals BP Pulse Temp Resp Height(growth percentile) Weight(growth percentile) 130/74 78 97.5 °F (36.4 °C) (Oral) 16 5' 5.5\" (1.664 m) 130 lb (59 kg) SpO2 BMI OB Status Smoking Status 98% 21.3 kg/m2 Hysterectomy Former Smoker Vitals History BMI and BSA Data Body Mass Index Body Surface Area  
 21.3 kg/m 2 1.65 m 2 Preferred Pharmacy Pharmacy Name Phone [facility] [address] [address] [facility] [phone] Your Updated Medication List  
  
   
This list is accurate as of 2/28/18  9:50 AM.  Always use your most recent med list.  
  
  
  
  
 acetaminophen 325 mg tablet Commonly known as:  TYLENOL Take 325 mg by mouth as needed for Pain. aspirin delayed-release 81 mg tablet Take 81 mg by mouth daily. citalopram 10 mg tablet Commonly known as:  Lis Justin Take 1 Tab by mouth daily. furosemide 40 mg tablet Commonly known as:  LASIX Take 1 Tab by mouth daily. ibuprofen 200 mg tablet Commonly known as:  MOTRIN Take 400 mg by mouth every six (6) hours as needed for Pain.  
  
 metoprolol tartrate 50 mg tablet Commonly known as:  LOPRESSOR  
 Take 1 Tab by mouth two (2) times a day. MIRALAX 17 gram packet Generic drug:  polyethylene glycol Take 17 g by mouth daily. potassium chloride 10 mEq tablet Commonly known as:  KLOR-CON M10  
TAKE ONE TABLET BY MOUTH DAILY pravastatin 10 mg tablet Commonly known as:  PRAVACHOL Take 1 Tab by mouth daily. predniSONE 10 mg dose pack Commonly known as:  STERAPRED DS See administration instruction per 10mg dose pack PROTONIX 40 mg tablet Generic drug:  pantoprazole Take 40 mg by mouth daily. raNITIdine 150 mg tablet Commonly known as:  ZANTAC Take 1 Tab by mouth daily. Follow-up Instructions Return in about 6 weeks (around 4/11/2018) for Medication follow up. Introducing [facility] SERVICES! Chrystal Juarez introduces Bit9 patient portal. Now you can access parts of your medical record, email your doctor's office, and request medication refills online. 1. In your internet browser, go to [email or web address] 2. Click on the First Time User? Click Here link in the Sign In box. You will see the New Member Sign Up page. 3. Enter your Bit9 Access Code exactly as it appears below. You will not need to use this code after youve completed the sign-up process. If you do not sign up before the expiration date, you must request a new code. · Bit9 Access Code: [email or web address] Expires: 4/15/2018  3:16 PM 
 
4. Enter the last four digits of your Social Security Number (xxxx) and Date of Birth (mm/dd/yyyy) as indicated and click Submit. You will be taken to the next sign-up page. 5. Create a [employer] ID. This will be your Bit9 login ID and cannot be changed, so think of one that is secure and easy to remember. 6. Create a Bit9 password. You can change your password at any time. 7. Enter your Password Reset Question and Answer. This can be used at a later time if you forget your password. 8. Enter your e-mail address. You will receive e-mail notification when new information is available in [address] 9. Click Sign Up. You can now view and download portions of your medical record. 10. Click the Download Summary menu link to download a portable copy of your medical information. If you have questions, please visit the Frequently Asked Questions section of the [employer] website. Remember, [employer] is NOT to be used for urgent needs. For medical emergencies, dial 911. Now available from your iPhone and Android! Please provide this summary of care documentation to your next provider. Your primary care clinician is listed as [id number] [facility]. If you have any questions after today's visit, please call [phone].

## 2018-04-20 ENCOUNTER — OFFICE VISIT (OUTPATIENT)
Dept: FAMILY MEDICINE CLINIC | Age: 83
End: 2018-04-20

## 2018-04-20 VITALS
DIASTOLIC BLOOD PRESSURE: 81 MMHG | HEART RATE: 80 BPM | TEMPERATURE: 97.7 F | SYSTOLIC BLOOD PRESSURE: 138 MMHG | BODY MASS INDEX: 20.93 KG/M2 | HEIGHT: 66 IN | WEIGHT: 130.2 LBS | RESPIRATION RATE: 12 BRPM | OXYGEN SATURATION: 99 %

## 2018-04-20 DIAGNOSIS — D64.9 ANEMIA, UNSPECIFIED TYPE: ICD-10-CM

## 2018-04-20 DIAGNOSIS — K59.09 CHRONIC CONSTIPATION: ICD-10-CM

## 2018-04-20 DIAGNOSIS — L98.9 BENIGN SKIN LESION: Primary | ICD-10-CM

## 2018-04-20 DIAGNOSIS — E78.00 HYPERCHOLESTEROLEMIA: ICD-10-CM

## 2018-04-20 NOTE — MR AVS SNAPSHOT
[address] [place] [address] Road 
[phone] Patient: Abigail Abdul MRN: [id number] AUGUSTUS:8/0/1850 Visit Information Date & Time Provider Department Dept. Phone Encounter #  
 4/20/2018  4:30 PM Ruth Burton MD [facility] [phone] [id number] Follow-up Instructions Return in about 2 months (around 6/20/2018). Your Appointments 6/4/2018  3:00 PM  
Follow Up with Razia Ortega DO [facility] ([facility]) Appt Note: 6 month follow up [place] [address] [address] [address] [phone] [place] [phone]  
  
    
 7/30/2018 12:30 PM  
Medicare Physical with Alison Rascon MD  
[facility] Internal [facility]) Appt Note: [place] [id number]  
 [facility] [address] [place] [address]  
  
    
 8/8/2018  5:00 PM  
FOLLOW UP EXAM with Ruth Burton MD  
[facility] ([facility]) Appt Note: 6 month f/u  
 [id number] [facility] [address] [place], [place] [phone]  
  
   
 [facility] [address] Upcoming Health Maintenance Date Due  
 GLAUCOMA SCREENING Q2Y 5/8/1996 Bone Densitometry (Dexa) Screening 5/8/1996 MEDICARE YEARLY EXAM 6/27/2018 DTaP/Tdap/Td series (2 - Td) 6/26/2027 Allergies as of 4/20/2018  Review Complete On: 4/20/2018 By: Thalia Clifford LPN Severity Noted Reaction Type Reactions Avapro [Irbesartan]  01/21/2011   Side Effect Other (comments) Hypotension and dizziness Contrast Agent [Iodine]  02/09/2018    Other (comments) Cant remember Crestor [Rosuvastatin]  01/21/2011   Side Effect Itching, Myalgia Pcn [Penicillins]  01/21/2011   Side Effect Rash Other reaction(s): hives Plaquenil [Hydroxychloroquine]  11/15/2016    Rash Quinine  01/21/2011   Side Effect Other (comments) Other reaction(s): other/intolerance \"ringing in my ears\" Ringing in ear Current Immunizations  Reviewed on 7/11/2016 Name Date Influenza High Dose Vaccine PF 11/1/2016 Influenza Vaccine 10/1/2010 Novel Influenza-H1N1-09, All Formulations 10/1/2010 Pneumococcal Polysaccharide (PPSV-23) 5/1/2010 Not reviewed this visit You Were Diagnosed With   
  
 Codes Comments Benign skin lesion    -  Primary ICD-10-CM: L98.9 ICD-9-CM: 709.9 Hypercholesterolemia     ICD-10-CM: E78.00 ICD-9-CM: 272.0 Chronic constipation     ICD-10-CM: K59.09 
ICD-9-CM: 564.00 Anemia, unspecified type     ICD-10-CM: D64.9 ICD-9-CM: 103. 9 Vitals BP Pulse Temp Resp Height(growth percentile) Weight(growth percentile) 138/81 (BP 1 Location: Right arm, BP Patient Position: Sitting) 80 97.7 °F (36.5 °C) (Oral) 12 5' 5.5\" (1.664 m) 130 lb 3.2 oz (59.1 kg) SpO2 BMI OB Status Smoking Status 99% 21.34 kg/m2 Hysterectomy Former Smoker BMI and BSA Data Body Mass Index Body Surface Area  
 21.34 kg/m 2 1.65 m 2 Preferred Pharmacy Pharmacy Name Phone [facility] [address], [address] [place] [phone] Your Updated Medication List  
  
   
This list is accurate as of 4/20/18  4:53 PM.  Always use your most recent med list.  
  
  
  
  
 acetaminophen 325 mg tablet Commonly known as:  TYLENOL Take 325 mg by mouth as needed for Pain. aspirin delayed-release 81 mg tablet Take 81 mg by mouth daily. citalopram 10 mg tablet Commonly known as:  Ryan Campanile Take 1 Tab by mouth daily. furosemide 40 mg tablet Commonly known as:  LASIX Take 1 Tab by mouth daily. ibuprofen 200 mg tablet Commonly known as:  MOTRIN Take 400 mg by mouth every six (6) hours as needed for Pain.  
  
 lidocaine 5 % ointment Commonly known as:  XYLOCAINE Apply  2-3 inches to affected area 3-4 times a day  
  
 metoprolol tartrate 50 mg tablet Commonly known as:  LOPRESSOR Take 1 Tab by mouth two (2) times a day. MIRALAX 17 gram packet Generic drug:  polyethylene glycol Take 17 g by mouth daily. potassium chloride 10 mEq tablet Commonly known as:  KLOR-CON M10  
TAKE ONE TABLET BY MOUTH DAILY pravastatin 10 mg tablet Commonly known as:  PRAVACHOL Take 1 Tab by mouth daily. predniSONE 10 mg dose pack Commonly known as:  STERAPRED DS See administration instruction per 10mg dose pack PROTONIX 40 mg tablet Generic drug:  pantoprazole Take 40 mg by mouth daily. raNITIdine 150 mg tablet Commonly known as:  ZANTAC Take 1 Tab by mouth daily. Follow-up Instructions Return in about 2 months (around 6/20/2018). Patient Instructions Learning About Saving Energy When You Have a Chronic Condition Introduction Everyday tasks can be tiring when you have COPD, heart failure, or another long-term (chronic) condition. You may feel at times that you've lost your ability to live your life. But learning to conserve, or save, your energy can help you be less tired. Conserving your energy means finding ways of doing daily activities with as little effort as possible. With some small changes in the way you do things, you can get your tasks done more easily. Some treatments are available that might help. Pulmonary rehabilitation can teach you ways to breathe easier. Cardiac rehabilitation can help make your heart stronger. You also may want to see an occupational or physical therapist. The therapist can give you more tips on building strength and moving with less effort. What can you do to conserve your energy? Planning · Make a list of what you have to do every day. Group the tasks by location. · Do all the chores in one part of your house around the same time. · Go out for errands or do chores at the time of day when you have the most energy. · Plan rest periods into your day. Getting things done · Sit down as often as you can when you get dressed, do chores, or cook. · Use a cart with wheels to roll items, such as laundry, from one room to another. · Push or slide boxes or other large items instead of lifting them. Reaching and bending · Put things you use the most on shelves that are at the level of your waist or shoulder. · Use long-handled grabbers or other tools to reach items on a high shelf or to  things off the floor. Use long-handled dusters when you clean the house. · Use a raised toilet seat to avoid bending too far to sit or stand up. Eating · Eat several small meals instead of three larger meals. · If you get too tired to eat much, try to choose healthy foods that have more calories. Have a yogurt-and-fruit smoothie for breakfast. Put avocado on a sandwich. Or add cheese or peanut butter to snacks. · If you don't feel very hungry, try to eat first and drink water or other fluids later, after a meal. This can help keep you from losing weight. Sip small amounts of fluids if you need to drink while you eat. Having sex · Choose the time of day when you have more energy. · A ovcl-pc-ybia position for sex can be less tiring. Sometimes you may want to focus more on caressing. Watch closely for changes in your health, and be sure to contact your doctor if you have any problems. Where can you learn more? Go to http://melvina-brdiger.info/. Enter H190 in the search box to learn more about \"Learning About Saving Energy When You Have a Chronic Condition. \" Current as of: May 12, 2017 Content Version: 11.4 © 1449-5980 Healthwise, Incorporated.  Care instructions adapted under license by [address] (which disclaims liability or warranty for this information). If you have questions about a medical condition or this instruction, always ask your healthcare professional. [address] any warranty or liability for your use of this information. Introducing [facility]! Willadean Saint introduces [employer] patient portal. Now you can access parts of your medical record, email your doctor's office, and request medication refills online. 1. In your internet browser, go to [email or web address]. [employer] 2. Click on the First Time User? Click Here link in the Sign In box. You will see the New Member Sign Up page. 3. Enter your [employer] Access Code exactly as it appears below. You will not need to use this code after youve completed the sign-up process. If you do not sign up before the expiration date, you must request a new code. · [employer] Access Code: [email or web address] Expires: 7/19/2018  4:53 PM 
 
4. Enter the last four digits of your Social Security Number (xxxx) and Date of Birth (mm/dd/yyyy) as indicated and click Submit. You will be taken to the next sign-up page. 5. Create a [employer] ID. This will be your [employer] login ID and cannot be changed, so think of one that is secure and easy to remember. 6. Create a [employer] password. You can change your password at any time. 7. Enter your Password Reset Question and Answer. This can be used at a later time if you forget your password. 8. Enter your e-mail address. You will receive e-mail notification when new information is available in [address] 9. Click Sign Up. You can now view and download portions of your medical record. 10. Click the Download Summary menu link to download a portable copy of your medical information. If you have questions, please visit the Frequently Asked Questions section of the [employer] website.  Remember, [employer] is NOT to be used for urgent needs. For medical emergencies, dial 911. Now available from your iPhone and Android! Please provide this summary of care documentation to your next provider. Your primary care clinician is listed as [id number] [facility]. If you have any questions after today's visit, please call [phone].

## 2018-04-20 NOTE — PROGRESS NOTES
Becca Petty is a 80 y.o. female  presents for eval of lesions on trunk  They have not changed any over the years. She has intermittent constipation. She had some redness in thigh on left but none now. No pain or cramping. Allergies   Allergen Reactions    Avapro [Irbesartan] Other (comments)     Hypotension and dizziness    Contrast Agent [Iodine] Other (comments)     Cant remember    Crestor [Rosuvastatin] Itching and Myalgia    Pcn [Penicillins] Rash     Other reaction(s): hives    Plaquenil [Hydroxychloroquine] Rash    Quinine Other (comments)     Other reaction(s): other/intolerance  \"ringing in my ears\"  Ringing in ear     Outpatient Prescriptions Marked as Taking for the 4/20/18 encounter (Office Visit) with Flakito Ortez MD   Medication Sig Dispense Refill    pantoprazole (PROTONIX) 40 mg tablet Take 40 mg by mouth daily.  lidocaine (XYLOCAINE) 5 % ointment Apply  2-3 inches to affected area 3-4 times a day 120 g 0    citalopram (CELEXA) 10 mg tablet Take 1 Tab by mouth daily. 90 Tab 1    predniSONE (STERAPRED DS) 10 mg dose pack See administration instruction per 10mg dose pack 21 Tab 0    potassium chloride (KLOR-CON M10) 10 mEq tablet TAKE ONE TABLET BY MOUTH DAILY 90 Tab 3    ibuprofen (MOTRIN) 200 mg tablet Take 400 mg by mouth every six (6) hours as needed for Pain.  furosemide (LASIX) 40 mg tablet Take 1 Tab by mouth daily. 90 Tab 1    acetaminophen (TYLENOL) 325 mg tablet Take 325 mg by mouth as needed for Pain.  metoprolol tartrate (LOPRESSOR) 50 mg tablet Take 1 Tab by mouth two (2) times a day. 180 Tab 3    pravastatin (PRAVACHOL) 10 mg tablet Take 1 Tab by mouth daily. 90 Tab 3    aspirin delayed-release 81 mg tablet Take 81 mg by mouth daily.  polyethylene glycol (MIRALAX) 17 gram packet Take 17 g by mouth daily.  ranitidine (ZANTAC) 150 mg tablet Take 1 Tab by mouth daily.  30 Tab 0     Patient Active Problem List   Diagnosis Code    Lupus erythematosus L93.0    Sjogren's syndrome ([facility]) M35.00    Chronic airway obstruction (HCC) J44.9    Hypertensive heart disease I11.9    Coronary atherosclerosis of native coronary artery I25.10    Lymphoma, non-Hodgkin's ([facility]) C85.90    Hypercholesterolemia E78.00    Essential hypertension, benign I10    Left displaced femoral neck fracture ([facility]) S72.002A    Diffuse large B-cell lymphoma of lymph nodes of multiple regions ([facility]) C83.38    Pulmonary fibrosis ([facility]) J84.10    Status post angioplasty with stent Z95.9    Mixed hyperlipidemia E78.2    Lupus (systemic lupus erythematosus) ([facility]) M32.9    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 Gr 1 DDfx. Mild LVH. Mild MR.    Essential hypertension, benign     GERD (gastroesophageal reflux disease)     Headache(784.0)     History of myocardial perfusion scan 09/14/2012    No evidence of ischemia or infarction. Very mild apical thinning. EF 77%. No WMA. TID 1.46, suggests 3-vessel CAD. Intermediate to high risk pharm stress test due to TID.  Hypercholesterolemia     Lower extremity venous duplex 05/08/2013    Left leg:  No venous thrombosis or venous insufficiency.     Lupus erythematosus 1992    Lymphoma, non-Hodgkin's ([facility] 5/2011    Low-grade being followed by Dr. Erika Aguirre without therapy currently    Mitral valve prolapse     10/2017    Pneumothorax 1981    Blebs in right lung with recurrent Pneumothorax    S/P cardiac cath 09/24/2012    y calcification of coronary arteries. LM minimal.  mLAD 35%. oD2 70% (unchanged). LCX mild. pRCA mild. Minimal ISR of prev stent. LVEDP 14-16. EF 65%. No WMA. Likely a falsely abnormal stress test.    Short-term memory loss     From fall in 2003    Sjogren's syndrome ([facility] 1992    report of ROGELIO, SSA, RF positive    Traumatic subdural hematoma ([facility] 2003    Head injury with subdural - s/p fall     Social History     Social History    Marital status:      Spouse name: N/A    Number of children: N/A    Years of education: N/A     Social History Main Topics    Smoking status: Former Smoker     Quit date: 1/1/1981    Smokeless tobacco: Never Used    Alcohol use Yes      Comment: once a month drinks wine    Drug use: No    Sexual activity: Not Currently     Other Topics Concern    Not on file     Social History Narrative    ** Merged History Encounter **          Family History   Problem Relation Age of Onset    Cancer Father     Cancer Brother         Review of Systems   Constitutional: Negative for chills and fever. Cardiovascular: Negative for chest pain. Gastrointestinal: Negative for constipation, diarrhea, nausea and vomiting. Skin: Negative for itching and rash. Vitals:    04/20/18 1641   BP: 138/81   Pulse: 80   Resp: 12   Temp: 97.7 °F (36.5 °C)   TempSrc: Oral   SpO2: 99%   Weight: 130 lb 3.2 oz (59.1 kg)   Height: 5' 5.5\" (1.664 m)   PainSc:   0 - No pain       Physical Exam   Constitutional: She is oriented to person, place, and time and well-developed, well-nourished, and in no distress. Cardiovascular: Normal rate, regular rhythm and normal heart sounds. Pulmonary/Chest: Effort normal and breath sounds normal.   Musculoskeletal: Normal range of motion. Neurological: She is alert and oriented to person, place, and time. Skin: Skin is warm and dry. 2 mm lesion on abdo RLQ and right groin. Papula smooth borders. Non tender.    Nursing note and vitals reviewed. Assessment/Plan      ICD-10-CM ICD-9-CM    1. Benign skin lesion L98.9 709.9    2. Hypercholesterolemia E78.00 272.0    3. Chronic constipation K59.09 564.00    4. Anemia, unspecified type D64.9 285.9      I have discussed the diagnosis with the patient and the intended plan of care as seen in the above orders. The patient has received an after-visit summary and questions were answered concerning future plans. I have discussed medication, side effects, and warnings with the patient in detail. The patient verbalized understanding and is in agreement with the plan of care. The patient will contact the office with any additional concerns. Follow-up Disposition:  Return in about 2 months (around 6/20/2018).   lab results and schedule of future lab studies reviewed with patient    Emily Salguero MD

## 2018-04-20 NOTE — PATIENT INSTRUCTIONS
Learning About Saving Energy When You Have a Chronic Condition  Introduction    Everyday tasks can be tiring when you have COPD, heart failure, or another long-term (chronic) condition. You may feel at times that you've lost your ability to live your life. But learning to conserve, or save, your energy can help you be less tired. Conserving your energy means finding ways of doing daily activities with as little effort as possible. With some small changes in the way you do things, you can get your tasks done more easily. Some treatments are available that might help. Pulmonary rehabilitation can teach you ways to breathe easier. Cardiac rehabilitation can help make your heart stronger. You also may want to see an occupational or physical therapist. The therapist can give you more tips on building strength and moving with less effort. What can you do to conserve your energy? Planning  · Make a list of what you have to do every day. Group the tasks by location. · Do all the chores in one part of your house around the same time. · Go out for errands or do chores at the time of day when you have the most energy. · Plan rest periods into your day. Getting things done  · Sit down as often as you can when you get dressed, do chores, or cook. · Use a cart with wheels to roll items, such as laundry, from one room to another. · Push or slide boxes or other large items instead of lifting them. Reaching and bending  · Put things you use the most on shelves that are at the level of your waist or shoulder. · Use long-handled grabbers or other tools to reach items on a high shelf or to  things off the floor. Use long-handled dusters when you clean the house. · Use a raised toilet seat to avoid bending too far to sit or stand up. Eating  · Eat several small meals instead of three larger meals. · If you get too tired to eat much, try to choose healthy foods that have more calories.  Have a yogurt-and-fruit smoothie for breakfast. Put avocado on a sandwich. Or add cheese or peanut butter to snacks. · If you don't feel very hungry, try to eat first and drink water or other fluids later, after a meal. This can help keep you from losing weight. Sip small amounts of fluids if you need to drink while you eat. Having sex  · Choose the time of day when you have more energy. · A grxf-ji-mlaj position for sex can be less tiring. Sometimes you may want to focus more on caressing. Watch closely for changes in your health, and be sure to contact your doctor if you have any problems. Where can you learn more? Go to http://melvina-bridger.info/. Enter H190 in the search box to learn more about \"Learning About Saving Energy When You Have a Chronic Condition. \"  Current as of: May 12, 2017  Content Version: 11.4  © 1494-0227 Healthwise, Incorporated. Care instructions adapted under license by [employer] (which disclaims liability or warranty for this information). If you have questions about a medical condition or this instruction, always ask your healthcare professional. [place] [address] any warranty or liability for your use of this information.

## 2018-04-20 NOTE — PROGRESS NOTES
Chief Complaint   Patient presents with    Rash     x 1 week     1. Have you been to the ER, urgent care clinic since your last visit? Hospitalized since your last visit? No    2. Have you seen or consulted any other health care providers outside of the [address] [place] [address] since your last visit? Include any pap smears or colon screening.  No

## 2018-05-09 ENCOUNTER — HOSPITAL ENCOUNTER (OUTPATIENT)
Dept: GENERAL RADIOLOGY | Age: 83
Discharge: HOME | End: 2018-05-09
Attending: PHYSICIAN ASSISTANT
Payer: MEDICARE

## 2018-05-09 DIAGNOSIS — R13.10 DYSPHAGIA: ICD-10-CM

## 2018-05-09 DIAGNOSIS — R13.10 DYSPHAGIA, UNSPECIFIED TYPE: ICD-10-CM

## 2018-05-09 PROCEDURE — G8997 SWALLOW GOAL STATUS: HCPCS

## 2018-05-09 PROCEDURE — 74011000255 HC RX REV CODE- 255: Performed by: PHYSICIAN ASSISTANT

## 2018-05-09 PROCEDURE — 74230 X-RAY XM SWLNG FUNCJ C+: CPT

## 2018-05-09 PROCEDURE — G8998 SWALLOW D/C STATUS: HCPCS

## 2018-05-09 PROCEDURE — G8996 SWALLOW CURRENT STATUS: HCPCS

## 2018-05-09 PROCEDURE — 92611 MOTION FLUOROSCOPY/SWALLOW: CPT

## 2018-05-09 RX ADMIN — BARIUM SULFATE 700 MG: 700 TABLET ORAL at 14:00

## 2018-05-09 RX ADMIN — BARIUM SULFATE 30 ML: 400 PASTE ORAL at 14:00

## 2018-05-09 RX ADMIN — BARIUM SULFATE 75 G: 960 POWDER, FOR SUSPENSION ORAL at 14:00

## 2018-05-09 NOTE — PROGRESS NOTES
Outpatient Modified Barium Swallow Evaluation    Patient: Brooklynn Francis (3 y.o. female)  Date: 5/9/2018  Primary Diagnosis: Dysphagia [R13.10]        Precautions: aspiration       Videofluoroscopy Results  MBS completed with results yielding oropharyngeal swallow fxn to be essentially [facility]. Pt tolerated reg solid, puree, thin liquids +/- straw, and 13 mm Ba pill with thin liquid wash without aspiration/penetration events. Bolus manipulation, tongue base retraction, laryngeal elevation, and overall pharyngeal motility/sensation were functional and timely throughout the study. Positive oropharyngeal clearance appreciated. Rec reg solid diet with thin liquids, aspiration precautions, oral care TID, and meds as tolerated. Results/recommendations d/w pt and family in detail with verbalized understanding utilizing video feedback. No further skilled SLP services are indicated at this time. Please re-consult if needed. Video Flouroscopic Procedures  [x] Lateral View   [] A-P View [] Scanned to level of Sternum    [x] Seated at 90 deg.   [] Other:    Presentation:    [x] Spoon   [x] Cup   [x] Straw   [] Syringe   [x] Consecutive Swallows  [] Other:    Consistencies:   [x] Ba+ liquid   [] Ba+ liquid (nectar)   [] Ba+ liquid (honey)   [x] Ba+ puree [x] Ba+ cookie [x] 13 mm Ba pill with thin Ba wash    Treatment Techniques Attempted  [] Head Turn: [] Right [] Left     [] Head Tilt: [] Right [] Left     [] Chin Down:  [] Small Sips/bites:  [] Effortful swallow:  [] Double swallow:  [] Other:    Results  Dysphagia Present:     [] Yes  [x] No    Ratings of Dysphagia:     [] Mild  [] Moderate  [] Severe    Stages of Breakdown:   [] Oral  [] Pharyngeal  [] Esophageal    Aspiration:   [] Yes    [x] No  [] At Risk     Cough: [] Yes      [] No     Penetration:   [] Yes    [x] No     Cough: [] Yes      [] No   [] Flash/trace   [] Mod   [] To Chords          Consistency Aspirated:   Consistency Penetrated:   [] Thin Liquid     [] Thin Liquid  [] Nectar-thick Liquid    [] Nectar-thick Liquid   [] Honey-thick Liquid    [] Honey-thick Liquid   [] Puree     [] Puree  [] Solid     [] Solid    Motility Problems with:  [] Lip Closure:    [] Mastication:   [] Bolus Formation/control:   [] A-P Transport:  [] Tongue Base Retraction:  [] Swallow Response (delayed):  [] Velopharyngeal Closure:  [] Pharyngeal Aspirations:  [] Laryngeal Elevation/adduction:  [] Epiglottic Inversion:  [] Pharyngeal motility/sensation:  [] Cricopharyngeal Relaxation:  [] Esophageal Peristalsis:  [] Other:    Timing of Aspiration/Penetration:  [] Before Swallow:  [] During Swallow:  [] After Swallow:    Transit Time Delay:  [] >1 Second  Oral  [] >1 Second Pharyngeal  [] >20 Second Esophageal     Residuals:  [] Vallecula:    [] Mild  [] Mod  [] Severe  [] Pyriform Sinus:   [] Mild  [] Mod  [] Severe  [] Posterior Pharyngeal Wall:  [] Mild  [] Mod  [] Severe    GCODES(GN): GCODESwallowing:  Swallow Current Status CH= 0%   Swallow Goal Status CH= 0%   Swallow D/C Status CH= 0%    Thank you for this referral.    Marilyn Deutsch M.S. CCC-SLP/L  Speech-Language Pathologist

## 2018-05-22 ENCOUNTER — TELEPHONE (OUTPATIENT)
Dept: CARDIOLOGY CLINIC | Age: 83
End: 2018-05-22

## 2018-05-22 NOTE — TELEPHONE ENCOUNTER
Patient's daughter called to inquire about patient and palpitations and to see if she needs to be sooner than June appt. Patient's daughter states patient started to have palpitations yesterday on and off. The palpitations were not followed by any other cardiac symptoms (i.e. Chest pain, dizziness, sob or swelling) According to patient's daughter, the palpitations have affected patient's daily life or activities. Patient's daughter states patient suffers from anxiety. Patient's daughter offered to take her to the ER, however, patient denied needing to go. Informed patient's daughter to keep upcoming appt, and if call us back if patient would develop any additional symptoms, or current symptoms worsen do not improve to possibly be evaluated sooner. Patient's daughter said she will check on her mother and her symptoms and call back as needed. This has been fully explained to the patient's daughter, who indicates understanding.

## 2018-06-04 ENCOUNTER — OFFICE VISIT (OUTPATIENT)
Dept: CARDIOLOGY CLINIC | Age: 83
End: 2018-06-04

## 2018-06-04 VITALS
HEART RATE: 88 BPM | BODY MASS INDEX: 21.33 KG/M2 | OXYGEN SATURATION: 95 % | HEIGHT: 65 IN | WEIGHT: 128 LBS | SYSTOLIC BLOOD PRESSURE: 120 MMHG | DIASTOLIC BLOOD PRESSURE: 76 MMHG

## 2018-06-04 DIAGNOSIS — I34.0 NON-RHEUMATIC MITRAL REGURGITATION: ICD-10-CM

## 2018-06-04 DIAGNOSIS — I11.9 BENIGN HYPERTENSIVE HEART DISEASE WITHOUT HEART FAILURE: ICD-10-CM

## 2018-06-04 DIAGNOSIS — R06.02 SOB (SHORTNESS OF BREATH) ON EXERTION: Primary | ICD-10-CM

## 2018-06-04 DIAGNOSIS — E78.2 MIXED HYPERLIPIDEMIA: ICD-10-CM

## 2018-06-04 DIAGNOSIS — I25.10 ATHEROSCLEROSIS OF NATIVE CORONARY ARTERY OF NATIVE HEART WITHOUT ANGINA PECTORIS: ICD-10-CM

## 2018-06-04 NOTE — MR AVS SNAPSHOT
[address] [address] [address] [address] [phone] Patient: Jessica Martin MRN: [id number] RFF:[id number] Visit Information Date & Time Provider Department Dept. Phone Encounter #  
 6/4/2018  3:00 PM [place], [address] [facility] [id number] Follow-up Instructions Return in about 6 months (around 12/4/2018). Your Appointments 6/20/2018  4:15 PM  
FOLLOW UP EXAM with Kady Patterson MD  
[facility] Appt Note: 2mo f/u skin lesion; hypercholesteroiemia; constipation; anemia [facility] [address] [place] [address]  
[phone]  
  
   
 [address] [address]  
  
    
 7/30/2018 12:30 PM  
Medicare Physical with Nabeel Duffy MD  
[facility] Internal [facility]) Appt Note: [facility]   
 [facility] [address] [place] [address]  
  
    
 8/8/2018  5:00 PM  
FOLLOW UP EXAM with Kady Patterson MD  
[facility] ([facility]) Appt Note: 6 month f/u  
 [id number] [facility] [address] [place] [address]  
[phone] Upcoming Health Maintenance Date Due  
 GLAUCOMA SCREENING Q2Y 5/8/1996 Bone Densitometry (Dexa) Screening 5/8/1996 MEDICARE YEARLY EXAM 6/27/2018 Influenza Age 5 to Adult 8/1/2018 DTaP/Tdap/Td series (2 - Td) 6/26/2027 Allergies as of 6/4/2018  Review Complete On: 6/4/2018 By: [place], DO Severity Noted Reaction Type Reactions Avapro [Irbesartan]  01/21/2011   Side Effect Other (comments) Hypotension and dizziness Contrast Agent [Iodine]  02/09/2018    Other (comments) Cant remember Crestor [Rosuvastatin]  01/21/2011   Side Effect Itching, Myalgia Pcn [Penicillins]  01/21/2011   Side Effect Rash Other reaction(s): hives Plaquenil [Hydroxychloroquine]  11/15/2016    Rash Quinine  01/21/2011   Side Effect Other (comments) Other reaction(s): other/intolerance \"ringing in my ears\" Ringing in ear Current Immunizations  Reviewed on 7/11/2016 Name Date Influenza High Dose Vaccine PF 11/1/2016 Influenza Vaccine 10/1/2010 Novel Influenza-H1N1-09, All Formulations 10/1/2010 Pneumococcal Polysaccharide (PPSV-23) 5/1/2010 Not reviewed this visit You Were Diagnosed With   
  
 Codes Comments Atherosclerosis of native coronary artery of native heart without angina pectoris    -  Primary ICD-10-CM: I25.10 ICD-9-CM: 414.01 Benign hypertensive heart disease without heart failure     ICD-10-CM: I11.9 ICD-9-CM: 402.10 Mixed hyperlipidemia     ICD-10-CM: E78.2 ICD-9-CM: 272.2 Non-rheumatic mitral regurgitation     ICD-10-CM: I34.0 ICD-9-CM: 424.0 Vitals BP Pulse Height(growth percentile) Weight(growth percentile) SpO2 BMI  
 120/76 88 5' 5\" (1.651 m) 128 lb (58.1 kg) 95% 21.3 kg/m2 OB Status Smoking Status Hysterectomy Former Smoker Vitals History BMI and BSA Data Body Mass Index Body Surface Area  
 21.3 kg/m 2 1.63 m 2 Preferred Pharmacy Pharmacy Name Phone [facility] [address], [address] [facility] [phone] Your Updated Medication List  
  
   
This list is accurate as of 6/4/18  4:57 PM.  Always use your most recent med list.  
  
  
  
  
 acetaminophen 325 mg tablet Commonly known as:  TYLENOL Take 325 mg by mouth as needed for Pain. aspirin delayed-release 81 mg tablet Take 81 mg by mouth daily. citalopram 10 mg tablet Commonly known as:  Donice Apt Take 1 Tab by mouth daily. furosemide 40 mg tablet Commonly known as:  LASIX Take 1 Tab by mouth daily. ibuprofen 200 mg tablet Commonly known as:  MOTRIN Take 400 mg by mouth every six (6) hours as needed for Pain.  
  
 lidocaine 5 % ointment Commonly known as:  XYLOCAINE Apply  2-3 inches to affected area 3-4 times a day  
  
 metoprolol tartrate 50 mg tablet Commonly known as:  LOPRESSOR Take 1 Tab by mouth two (2) times a day. MIRALAX 17 gram packet Generic drug:  polyethylene glycol Take 17 g by mouth daily. potassium chloride 10 mEq tablet Commonly known as:  KLOR-CON M10  
TAKE ONE TABLET BY MOUTH DAILY pravastatin 10 mg tablet Commonly known as:  PRAVACHOL Take 1 Tab by mouth daily. raNITIdine 150 mg tablet Commonly known as:  ZANTAC Take 1 Tab by mouth daily. We Performed the Following AMB POC EKG ROUTINE W/ 12 LEADS, INTER & REP [51779 CPT(R)] Follow-up Instructions Return in about 6 months (around 12/4/2018). To-Do List   
 06/04/2018 ECHO:  2D ECHO COMPLETE ADULT (TTE) W OR WO CONTR Patient Instructions If more palps and / or more short of breath, call our office Please provide this summary of care documentation to your next provider. Your primary care clinician is listed as [id number] [facility]. If you have any questions after today's visit, please call [phone].

## 2018-06-04 NOTE — PROGRESS NOTES
1. Have you been to the ER, urgent care clinic since your last visit? Hospitalized since your last visit? Yes, 2-13-18 Dysphagia    2. Have you seen or consulted any other health care providers outside of the [facility] since your last visit? Include any pap smears or colon screening.   no

## 2018-06-04 NOTE — PROGRESS NOTES
Review of Systems   Constitutional: Positive for malaise/fatigue. Negative for chills and fever. Respiratory: Positive for cough and shortness of breath. Negative for hemoptysis and wheezing. Cardiovascular: Positive for chest pain and palpitations. Negative for orthopnea and leg swelling. Gastrointestinal: Positive for constipation, diarrhea and heartburn. Negative for abdominal pain, blood in stool, melena, nausea and vomiting. Musculoskeletal: Positive for joint pain and myalgias. Negative for falls. Neurological: Positive for dizziness.

## 2018-06-04 NOTE — PROGRESS NOTES
HPI:  I saw Lamont Adams in my office today in cardiovascular evaluation regarding her chronic ischemic heart disease.  Ms. Garfield Varner is a pleasant 80year old  female with history of hypertension, hypercholesterolemia, erythrocytosis, Sjogren's syndrome, non Hodgkin's lymphoma, and coronary artery disease, status post stenting of a right coronary artery back in August of 2007.  She has had a lot of problems with shortness of breath and fatigue over the years, which I have felt have been multifactorial. She has had two separate cardiac catheterizations since her angioplasty due to these symptoms, one in 2011 and another one in September, 2012, both of which demonstrated nonobstructive coronary artery disease without recurrent in stent restenosis.     She does have some chronic fibrotic rales in her right base and history of a right upper lobectomy for blebs back in 3758, so certainly part of her problem could be pulmonary and her O2 saturation on room air was 91 % when I saw her on December 1, 2017 but it is 95% today.      When I saw her in the office December 2015 she seemed to be having the same shortness of breath issues and some desaturation with ambulation that she had when I saw her again in the office in December 2017 suggesting that this was a pulmonary process. However, in the past couple of years she has been living up in the [facility] area and more recently saw Dr. Daxa Wray for her increased shortness of breath and apparently found to have a significant mitral regurgitation murmur which prompted an echocardiogram August 18, 2017 which revealed moderate left atrial enlargement and prolapse of the anterior cusp of the mitral valve with severe mitral regurgitation. In reviewing her echo from back in December 2010 she had only mild mitral regurgitation at that time and in reviewing my note of December 15, 2015 she did not appear to have a significant heart murmur.   However,  when I saw her on December 1, 2017 she clearly appeared to have a significant mitral regurgitation murmur but she was having some dementia issues at that time did not appear to be a candidate for any type of intervention so I did not do any further cardiac workup at that time. She comes in today and relates that she is doing reasonably well, but within the past month or so she has noticed that she is a little bit more short of breath with activity and she has been having some palpitations for the first time that she can remember in the past couple of weeks. She does have some right-sided chest pain problems but this is a problem that she has had on and off since she had her right upper lobectomy for blebs back in 1981. He also has been having some intermittent coughing which gets severe at times, but it is not clear when this occurs certainly if it occurs when she is lying down it could potentially reflect failure. Encounter Diagnoses   Name Primary?  SOB (shortness of breath) on exertion, multifactorial rule out component of diastolic heart failure Yes    Atherosclerosis of native coronary artery of native heart without angina pectoris     Hypertensive heart disease     Mixed hyperlipidemia     Non-rheumatic mitral regurgitation, severe        Discussion: This lady appears to be doing reasonably well, but is having a little bit more short of breath with activity and has had some palpitations recently so she certainly could be developing some early left ventricular dysfunction and paroxysmal atrial fibrillation. It does not appear as if she has had a recent BNP or BMP and I am going to get those completed. I am also going to go ahead and do an echocardiogram to reevaluate her left ventricular function, left atrial size, mitral regurgitation, and pulmonary pressures.     When I saw her last there was concern about significant dementia developing but according to her daughter she has really improved in terms of her mental status which is 90% back to normal and consequently further workup and management for her mitral regurgitation if necessary could be considered, although I would be very reluctant to send her to a surgical mitral valve replacement but certainly a mitral valve clip or percutaneous mitral valve replacement which is beginning to be done around the country might be a consideration if her LV function appears to be deteriorating. I will review her echo with further recommendations and plans on seeing him again in 6 months unless she has the development of more cardiovascular symptomatology. PCP:   Candace Dawkins MD      Past Medical History:   Diagnosis Date    CAD (coronary artery disease)     Chronic    Chronic airway obstruction, not elsewhere classified     Frequent cough, wheezing secondary to lupus and COPD    Closed left hip fracture (HCC)     Decubitus ulcer of sacral region, stage 3 ([facility] 8/18/2016    Dyspnea on exertion     Echocardiogram 12/02/2010    EF 60-65%. Gr 1 DDfx. Mild LVH. Mild MR.    Essential hypertension, benign     GERD (gastroesophageal reflux disease)     Headache(784.0)     History of myocardial perfusion scan 09/14/2012    No evidence of ischemia or infarction. Very mild apical thinning. EF 77%. No WMA. TID 1.46, suggests 3-vessel CAD. Intermediate to high risk pharm stress test due to TID.  Hypercholesterolemia     Lower extremity venous duplex 05/08/2013    Left leg:  No venous thrombosis or venous insufficiency.  Lupus erythematosus 1992    Lymphoma, non-Hodgkin's ([facility] [address] 5/2011    Low-grade being followed by Dr. Miguel Mccullough without therapy currently    Mitral valve prolapse     10/2017    Pneumothorax 1981    Blebs in right lung with recurrent Pneumothorax    S/P cardiac cath 09/24/2012    Hvy calcification of coronary arteries. LM minimal.  mLAD 35%. oD2 70% (unchanged). LCX mild. pRCA mild. Minimal ISR of prev stent. LVEDP 14-16. EF 65%. No WMA. Likely a falsely abnormal stress test.    Short-term memory loss     From fall in 2003    Sjogren's syndrome ([facility] [address] [facility] 1992    report of ROGELIO, SSA, RF positive    Traumatic subdural hematoma ([facility] [address] 2003    Head injury with subdural - s/p fall         Past Surgical History:   Procedure Laterality Date    CARDIAC SURG PROCEDURE UNLIST      cardiac stent    CHEST SURGERY PROCEDURE UNLISTED  1981    RUL removal    HX ADENOIDECTOMY  11/2013    eyelids lifted    HX CATARACT REMOVAL Bilateral     w/ lens implants    HX CORONARY STENT PLACEMENT  8/21/2007    HX GI  02/13/2018    endoscopy    HX HEART CATHETERIZATION  9/24/2012    HX HEART CATHETERIZATION  8/21/2007    Dr[facility] Lorenza Reeves [facility] - stent placed    HX HEENT  2003    subdural hematoma removed s/p fall    HX HYSTERECTOMY  1974    HX LOBECTOMY Right     upper portion    HX ORTHOPAEDIC  07/2016    Patial left hip replacement    HX PNEUMONECTOMY      partial    NEUROLOGICAL PROCEDURE UNLISTED      subdural hematoma         Current Outpatient Rx   Name  Route  Sig  Dispense  Refill    pravastatin (PRAVACHOL) 40 mg tablet        TAKE 1 TABLET BY MOUTH DAILY. 30 Tab    6       CYCLE FILL MEDICATION. Authorization is required f ...  potassium chloride (K-DUR, KLOR-CON) 10 mEq tablet    Oral    Take 1 Tab by mouth daily. 30 Tab    6      metoprolol (LOPRESSOR) 50 mg tablet    Oral    Take 1 Tab by mouth two (2) times a day. 60 Tab    11      ranitidine (ZANTAC) 150 mg tablet    Oral    Take 150 mg by mouth daily.  aspirin 81 mg tablet    Oral    Take 81 mg by mouth daily.  furosemide (LASIX) 20 mg tablet    Oral    Take 20 mg by mouth daily.  budesonide (RHINOCORT AQUA) 32 mcg/Actuation nasal spray    Nasal    2 Sprays by Nasal route as needed.  ALBUTEROL SULFATE (VENTOLIN IN)    Inhalation    Take 2 puffs by inhalation every four (4) hours as needed.                    Allergies Allergen Reactions    Avapro [Irbesartan] Other (comments)     Hypotension and dizziness    Contrast Agent [Iodine] Other (comments)     Cant remember    Crestor [Rosuvastatin] Itching and Myalgia    Pcn [Penicillins] Rash     Other reaction(s): hives    Plaquenil [Hydroxychloroquine] Rash    Quinine Other (comments)     Other reaction(s): other/intolerance  \"ringing in my ears\"  Ringing in ear       Review of Systems:    Constitutional: Positive for malaise/fatigue. Negative for chills and fever. Respiratory: Positive for cough and shortness of breath. Negative for hemoptysis and wheezing. Cardiovascular: Positive for chest pain and palpitations. Negative for orthopnea and leg swelling. Gastrointestinal: Positive for constipation, diarrhea and heartburn. Negative for abdominal pain, blood in stool, melena, nausea and vomiting. Musculoskeletal: Positive for joint pain and myalgias. Negative for falls. Neurological: Positive for dizziness. Physical Exam:    Visit Vitals    /76    Pulse 88    Ht 5' 5\" (1.651 m)    Wt 58.1 kg (128 lb)    SpO2 95%    BMI 21.3 kg/m2       The patient was a cooperative, alert, well-developed, well-nourished, 31-year-old, [facility] American female, who is in no acute distress at the time of the examination. HEENT: Conjuctiva white, mucosa moist, no pallor or cyanosis. NECK: Supple without masses, tenderness or thyromegaly. There was no jugular venous distention. Carotid are full bilaterally without bruits. CHEST: Symmetrical with good excursion. LUNGS: Clear to auscultation in all fields. Few chronic rales in the right base  HEART: The apex is not displaced. There were no lifts, thrills or heaves. There is a normal S1 and S2. There is a grade 2/6 holosystolic murmur at the apex with radiation to the axilla without appreciable diastolic murmurs, rubs, clicks, or gallops auscultated. ABDOMEN: Soft without masses, tenderness or organomegaly.   EXTREMITIES: Full peripheral pulses with trace to 1 +  peripheral edema of the left leg with minimal on the right. Review of Data: Please refer to past medical history for most recent cardiac testing. Lab Results   Component Value Date/Time    Cholesterol, total 176 06/08/2015 12:00 AM    HDL Cholesterol 82 06/08/2015 12:00 AM    LDL, calculated 82 06/08/2015 12:00 AM    Triglyceride 60 06/08/2015 12:00 AM    CHOL/HDL Ratio 2.2 05/03/2011 09:05 AM         Results for orders placed or performed in visit on 06/04/18   AMB POC EKG ROUTINE W/ 12 LEADS, INTER & REP     Status: None    Narrative    Normal sinus rhythm rate 64. Mild diffuse nonspecific ST-T flattening. This EKG is otherwise within normal limits and similar to the EKG of December 1, 2017. Nona Vásquez D.O., F.A.C.C. [facility]  [facility] [place]  [facility] [address]    PLEASE NOTE:  This document has been produced using voice recognition software. Unrecognized errors in transcription may be present.

## 2018-06-12 ENCOUNTER — HOSPITAL ENCOUNTER (OUTPATIENT)
Dept: NON INVASIVE DIAGNOSTICS | Age: 83
Discharge: HOME | End: 2018-06-12
Attending: INTERNAL MEDICINE
Payer: MEDICARE

## 2018-06-12 DIAGNOSIS — I34.0 NON-RHEUMATIC MITRAL REGURGITATION: ICD-10-CM

## 2018-06-12 PROCEDURE — 93306 TTE W/DOPPLER COMPLETE: CPT

## 2018-06-14 NOTE — PROGRESS NOTES
Per your last office note, \"This lady appears to be doing reasonably well, but is having a little bit more short of breath with activity and has had some palpitations recently so she certainly could be developing some early left ventricular dysfunction and paroxysmal atrial fibrillation. It does not appear as if she has had a recent BNP or BMP and I am going to get those completed. I am also going to go ahead and do an echocardiogram to reevaluate her left ventricular function, left atrial size, mitral regurgitation, and pulmonary pressures. \"

## 2018-06-15 RX ORDER — FUROSEMIDE 40 MG/1
TABLET ORAL
Qty: 5 TAB | Refills: 1 | Status: SHIPPED | OUTPATIENT
Start: 2018-06-15 | End: 2018-06-20 | Stop reason: SDUPTHER

## 2018-06-15 NOTE — TELEPHONE ENCOUNTER
This patient contacted office for the following prescriptions to be filled:    Medication requested :   Requested Prescriptions     Pending Prescriptions Disp Refills    furosemide (LASIX) 40 mg tablet [Pharmacy Med Name: FUROSEMIDE 40 MG TABLET] 90 Tab 1     Sig: take 1 tablet by mouth once daily       PCP: Dr. Vijay Galarza MD  Pharmacy or Print: Pharmacy  Mail order or Local pharmacy: Local--[facility]    Scheduled appointment if not seen by current providers in office: last appt 4/20/18, future appt 6/20/18

## 2018-06-20 ENCOUNTER — OFFICE VISIT (OUTPATIENT)
Dept: FAMILY MEDICINE CLINIC | Age: 83
End: 2018-06-20

## 2018-06-20 ENCOUNTER — TELEPHONE (OUTPATIENT)
Dept: CARDIOLOGY CLINIC | Age: 83
End: 2018-06-20

## 2018-06-20 VITALS
WEIGHT: 128 LBS | BODY MASS INDEX: 21.33 KG/M2 | TEMPERATURE: 97.7 F | RESPIRATION RATE: 12 BRPM | DIASTOLIC BLOOD PRESSURE: 72 MMHG | HEIGHT: 65 IN | OXYGEN SATURATION: 97 % | SYSTOLIC BLOOD PRESSURE: 118 MMHG | HEART RATE: 68 BPM

## 2018-06-20 DIAGNOSIS — J43.8 OTHER EMPHYSEMA (HCC): Primary | ICD-10-CM

## 2018-06-20 DIAGNOSIS — I10 ESSENTIAL HYPERTENSION, BENIGN: ICD-10-CM

## 2018-06-20 DIAGNOSIS — E78.00 HYPERCHOLESTEROLEMIA: ICD-10-CM

## 2018-06-20 DIAGNOSIS — R30.0 DYSURIA: ICD-10-CM

## 2018-06-20 LAB
BILIRUB UR QL STRIP: NEGATIVE
GLUCOSE UR-MCNC: NEGATIVE MG/DL
KETONES P FAST UR STRIP-MCNC: NEGATIVE MG/DL
PH UR STRIP: 7 [PH] (ref 4.6–8)
PROT UR QL STRIP: NEGATIVE
SP GR UR STRIP: 1.01 (ref 1–1.03)
UA UROBILINOGEN AMB POC: NORMAL (ref 0.2–1)
URINALYSIS CLARITY POC: CLEAR
URINALYSIS COLOR POC: YELLOW
URINE BLOOD POC: NEGATIVE
URINE LEUKOCYTES POC: NORMAL
URINE NITRITES POC: NEGATIVE

## 2018-06-20 RX ORDER — NITROFURANTOIN 25; 75 MG/1; MG/1
100 CAPSULE ORAL 2 TIMES DAILY
Qty: 10 CAP | Refills: 0 | Status: SHIPPED | OUTPATIENT
Start: 2018-06-20 | End: 2018-06-25

## 2018-06-20 RX ORDER — FUROSEMIDE 40 MG/1
TABLET ORAL
Qty: 5 TAB | Refills: 1 | Status: SHIPPED | OUTPATIENT
Start: 2018-06-20 | End: 2018-06-28 | Stop reason: SDUPTHER

## 2018-06-20 RX ORDER — PRAVASTATIN SODIUM 10 MG/1
10 TABLET ORAL DAILY
Qty: 90 TAB | Refills: 3 | Status: SHIPPED | OUTPATIENT
Start: 2018-06-20

## 2018-06-20 NOTE — TELEPHONE ENCOUNTER
----- Message from Lola Waite LPN sent at 2/59/3712  8:56 AM EDT -----  Per your last office note, \"This lady appears to be doing reasonably well, but is having a little bit more short of breath with activity and has had some palpitations recently so she certainly could be developing some early left ventricular dysfunction and paroxysmal atrial fibrillation. It does not appear as if she has had a recent BNP or BMP and I am going to get those completed. I am also going to go ahead and do an echocardiogram to reevaluate her left ventricular function, left atrial size, mitral regurgitation, and pulmonary pressures. \"

## 2018-06-20 NOTE — TELEPHONE ENCOUNTER
I called and discussed the results of the patient's echocardiogram with her. Her echocardiogram appears to be essentially within normal limits with completely normal left ventricular function and some mild to moderate mitral regurgitation and moderate left atrial enlargement. I would simply recommend continue medical therapy for now. I believe we are waiting for a BMP and BNP.  ES

## 2018-06-20 NOTE — PROGRESS NOTES
Kali Ruiz is a 80 y.o. female  presents for follow up. No new complaints. No chest pain or SOB. No weakness or numbness. Allergies   Allergen Reactions    Avapro [Irbesartan] Other (comments)     Hypotension and dizziness    Contrast Agent [Iodine] Other (comments)     Cant remember    Crestor [Rosuvastatin] Itching and Myalgia    Pcn [Penicillins] Rash     Other reaction(s): hives    Plaquenil [Hydroxychloroquine] Rash    Quinine Other (comments)     Other reaction(s): other/intolerance  \"ringing in my ears\"  Ringing in ear     Outpatient Prescriptions Marked as Taking for the 6/20/18 encounter (Office Visit) with Flaca Richmond MD   Medication Sig Dispense Refill    furosemide (LASIX) 40 mg tablet take 1 tablet by mouth once daily 5 Tab 1    lidocaine (XYLOCAINE) 5 % ointment Apply  2-3 inches to affected area 3-4 times a day 120 g 0    citalopram (CELEXA) 10 mg tablet Take 1 Tab by mouth daily. 90 Tab 1    potassium chloride (KLOR-CON M10) 10 mEq tablet TAKE ONE TABLET BY MOUTH DAILY 90 Tab 3    ibuprofen (MOTRIN) 200 mg tablet Take 400 mg by mouth every six (6) hours as needed for Pain.  acetaminophen (TYLENOL) 325 mg tablet Take 325 mg by mouth as needed for Pain.  metoprolol tartrate (LOPRESSOR) 50 mg tablet Take 1 Tab by mouth two (2) times a day. 180 Tab 3    pravastatin (PRAVACHOL) 10 mg tablet Take 1 Tab by mouth daily. 90 Tab 3    aspirin delayed-release 81 mg tablet Take 81 mg by mouth daily.  polyethylene glycol (MIRALAX) 17 gram packet Take 17 g by mouth daily.  ranitidine (ZANTAC) 150 mg tablet Take 1 Tab by mouth daily.  30 Tab 0     Patient Active Problem List   Diagnosis Code    Lupus erythematosus L93.0    Sjogren's syndrome ([facility] M35.00    Chronic airway obstruction (HCC) J44.9    Hypertensive heart disease I11.9    Coronary atherosclerosis of native coronary artery I25.10    Lymphoma, non-Hodgkin's (HCC) C85.90    Hypercholesterolemia E78.00  Essential hypertension, benign I10    Left displaced femoral neck fracture ([facility]) S72.002A    Diffuse large B-cell lymphoma of lymph nodes of multiple regions ([facility]) C83.38    Pulmonary fibrosis ([facility]) J84.10    Status post angioplasty with stent Z95.9    Mixed hyperlipidemia E78.2    Lupus (systemic lupus erythematosus) ([facility]) M32.9    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 Gr 1 DDfx. Mild LVH. Mild MR.    Essential hypertension, benign     GERD (gastroesophageal reflux disease)     Headache(784.0)     History of myocardial perfusion scan 09/14/2012    No evidence of ischemia or infarction. Very mild apical thinning. EF 77%. No WMA. TID 1.46, suggests 3-vessel CAD. Intermediate to high risk pharm stress test due to TID.  Hypercholesterolemia     Lower extremity venous duplex 05/08/2013    Left leg:  No venous thrombosis or venous insufficiency.  Lupus erythematosus 1992    Lymphoma, non-Hodgkin's ([facility] 5/2011    Low-grade being followed by Dr. Rustam Kramer without therapy currently    Mitral valve prolapse     10/2017    Pneumothorax 1981    Blebs in right lung with recurrent Pneumothorax    S/P cardiac cath 09/24/2012    Hvy calcification of coronary arteries. LM minimal.  mLAD 35%. oD2 70% (unchanged). LCX mild. pRCA mild. Minimal ISR of prev stent. LVEDP 14-16. EF 65%. No WMA.   Likely a falsely abnormal stress test.    Short-term memory loss     From fall in 2003    Sjogren's syndrome ([facility] 1992    report of ROGELIO, SSA, RF positive    Traumatic subdural hematoma ([facility] 2003    Head injury with subdural - s/p fall     Social History     Social History    Marital status:      Spouse name: N/A    Number of children: N/A    Years of education: N/A     Social History Main Topics    Smoking status: Former Smoker     Quit date: 1/1/1981    Smokeless tobacco: Never Used    Alcohol use Yes      Comment: once a month drinks wine    Drug use: No    Sexual activity: Not Currently     Other Topics Concern    None     Social History Narrative    ** Merged History Encounter **          Family History   Problem Relation Age of Onset    Cancer Father     Cancer Brother         Review of Systems   Constitutional: Negative for chills and fever. Cardiovascular: Negative for chest pain and palpitations. Gastrointestinal: Negative for constipation, diarrhea, nausea and vomiting. Psychiatric/Behavioral: Negative. Vitals:    06/20/18 1627   BP: 118/72   Pulse: 68   Resp: 12   Temp: 97.7 °F (36.5 °C)   TempSrc: Oral   SpO2: 97%   Weight: 128 lb (58.1 kg)   Height: 5' 5\" (1.651 m)   PainSc:   8   PainLoc: Back       Physical Exam   Constitutional: She is oriented to person, place, and time and well-developed, well-nourished, and in no distress. Neck: Normal range of motion. Neck supple. Cardiovascular: Normal rate, regular rhythm and normal heart sounds. Pulmonary/Chest: Effort normal and breath sounds normal.   Neurological: She is alert and oriented to person, place, and time. Skin: Skin is warm and dry. Nursing note and vitals reviewed. Assessment/Plan      ICD-10-CM ICD-9-CM    1. Other emphysema ([facility] J43.8 492.8    2. Hypercholesterolemia E78.00 272.0 pravastatin (PRAVACHOL) 10 mg tablet   3. Essential hypertension, benign I10 401.1 furosemide (LASIX) 40 mg tablet   4.  Dysuria R30.0 788.1 AMB POC URINALYSIS DIP STICK AUTO W/O MICRO      nitrofurantoin, macrocrystal-monohydrate, (MACROBID) 100 mg capsule     I have discussed the diagnosis with the patient and the intended plan of care as seen in the above orders. The patient has received an after-visit summary and questions were answered concerning future plans. I have discussed medication, side effects, and warnings with the patient in detail. The patient verbalized understanding and is in agreement with the plan of care. The patient will contact the office with any additional concerns. Follow-up Disposition:  Return in about 6 months (around 12/20/2018).   lab results and schedule of future lab studies reviewed with patient    Deion Hadley MD

## 2018-06-20 NOTE — MR AVS SNAPSHOT
[facility] [address] [facility] 
[phone] Patient: Zak Myers MRN: [id number] VXQ:[birth year] Visit Information Date & Time Provider Department Dept. Phone Encounter #  
 6/20/2018  4:15 PM Slick Laird MD [facility] [phone] [id number] Follow-up Instructions Return in about 6 months (around 12/20/2018). Your Appointments 7/30/2018 12:30 PM  
Medicare Physical with Doris Castillo MD  
[facility] [address] [facility]) Appt Note: [facility]   
 [facility] [address] [facility] [address] [facility] [address] [facility] [address] [facility] [address]  
  
    
 8/8/2018  5:00 PM  
FOLLOW UP EXAM with Slick Laird MD  
[facility] ([address]) Appt Note: 6 month f/u  
 [address] [facility] [address] [facility] [id number] [phone]  
  
   
 [address] [address]  
  
    
 12/11/2018  3:00 PM  
Follow Up with Mehran Mattson DO [facility] ([address]) Appt Note: 6 month follow up [place] [address]  
[phone] [address] [place] [address] [address] Upcoming Health Maintenance Date Due  
 GLAUCOMA SCREENING Q2Y 5/8/1996 Bone Densitometry (Dexa) Screening 5/8/1996 MEDICARE YEARLY EXAM 6/27/2018 Influenza Age 5 to Adult 8/1/2018 DTaP/Tdap/Td series (2 - Td) 6/26/2027 Allergies as of 6/20/2018  Review Complete On: 6/20/2018 By: Slick Laird MD  
  
 Severity Noted Reaction Type Reactions Avapro [Irbesartan]  01/21/2011   Side Effect Other (comments) Hypotension and dizziness Contrast Agent [Iodine]  02/09/2018    Other (comments) Cant remember Crestor [Rosuvastatin]  01/21/2011   Side Effect Itching, Myalgia Pcn [Penicillins]  01/21/2011   Side Effect Rash Other reaction(s): hives Plaquenil [Hydroxychloroquine]  11/15/2016    Rash Quinine  01/21/2011   Side Effect Other (comments) Other reaction(s): other/intolerance \"ringing in my ears\" Ringing in ear Current Immunizations  Reviewed on 7/11/2016 Name Date Influenza High Dose Vaccine PF 11/1/2016 Influenza Vaccine 10/1/2010 Novel Influenza-H1N1-09, All Formulations 10/1/2010 Pneumococcal Polysaccharide (PPSV-23) 5/1/2010 Not reviewed this visit You Were Diagnosed With   
  
 Codes Comments Other emphysema ([facility]    -  Primary ICD-10-CM: J43.8 ICD-9-CM: 492.8 Hypercholesterolemia     ICD-10-CM: E78.00 ICD-9-CM: 272.0 Essential hypertension, benign     ICD-10-CM: I10 
ICD-9-CM: 401.1 Dysuria     ICD-10-CM: R30.0 ICD-9-CM: 059. 1 Vitals BP Pulse Temp Resp Height(growth percentile) Weight(growth percentile) 118/72 (BP 1 Location: Left arm, BP Patient Position: Sitting) 68 97.7 °F (36.5 °C) (Oral) 12 5' 5\" (1.651 m) 128 lb (58.1 kg) SpO2 BMI OB Status Smoking Status 97% 21.3 kg/m2 Hysterectomy Former Smoker BMI and BSA Data Body Mass Index Body Surface Area  
 21.3 kg/m 2 1.63 m 2 Preferred Pharmacy Pharmacy Name Phone [facility] [address] [place] [phone] Your Updated Medication List  
  
   
This list is accurate as of 6/20/18  4:50 PM.  Always use your most recent med list.  
  
  
  
  
 acetaminophen 325 mg tablet Commonly known as:  TYLENOL Take 325 mg by mouth as needed for Pain. aspirin delayed-release 81 mg tablet Take 81 mg by mouth daily. citalopram 10 mg tablet Commonly known as:  Juan F Mulders Take 1 Tab by mouth daily. furosemide 40 mg tablet Commonly known as:  LASIX  
take 1 tablet by mouth once daily ibuprofen 200 mg tablet Commonly known as:  MOTRIN Take 400 mg by mouth every six (6) hours as needed for Pain.  
  
 lidocaine 5 % ointment Commonly known as:  XYLOCAINE Apply  2-3 inches to affected area 3-4 times a day  
  
 metoprolol tartrate 50 mg tablet Commonly known as:  LOPRESSOR Take 1 Tab by mouth two (2) times a day. MIRALAX 17 gram packet Generic drug:  polyethylene glycol Take 17 g by mouth daily. nitrofurantoin (macrocrystal-monohydrate) 100 mg capsule Commonly known as:  MACROBID Take 1 Cap by mouth two (2) times a day for 5 days. potassium chloride 10 mEq tablet Commonly known as:  KLOR-CON M10  
TAKE ONE TABLET BY MOUTH DAILY pravastatin 10 mg tablet Commonly known as:  PRAVACHOL Take 1 Tab by mouth daily. raNITIdine 150 mg tablet Commonly known as:  ZANTAC Take 1 Tab by mouth daily. Prescriptions Sent to Pharmacy Refills  
 nitrofurantoin, macrocrystal-monohydrate, (MACROBID) 100 mg capsule 0 Sig: Take 1 Cap by mouth two (2) times a day for 5 days. Class: Normal  
 [facility]: [id number] [address] Ph #: [phone] Route: Oral  
 furosemide (LASIX) 40 mg tablet 1 Sig: take 1 tablet by mouth once daily Class: Normal  
 [facility]: [id number] [address] [place] [address] Ph #: [phone]  
 pravastatin (PRAVACHOL) 10 mg tablet 3 Sig: Take 1 Tab by mouth daily. Class: Normal  
 [facility]: [id number] Ph #: [phone] Route: Oral  
  
We Performed the Following AMB POC URINALYSIS DIP STICK AUTO W/O MICRO [66208 CPT(R)] Follow-up Instructions Return in about 6 months (around 12/20/2018). Patient Instructions Chronic Obstructive Pulmonary Disease (COPD): Care Instructions Your Care Instructions Chronic obstructive pulmonary disease (COPD) is a general term for a group of lung diseases, including emphysema and chronic bronchitis. People with COPD have decreased airflow in and out of the lungs, which makes it hard to breathe. The airways also can get clogged with thick mucus. Cigarette smoking is a major cause of COPD. Although there is no cure for COPD, you can slow its progress. Following your treatment plan and taking care of yourself can help you feel better and live longer. Follow-up care is a key part of your treatment and safety. Be sure to make and go to all appointments, and call your doctor if you are having problems. It's also a good idea to know your test results and keep a list of the medicines you take. How can you care for yourself at home? ?Staying healthy ? · Do not smoke. This is the most important step you can take to prevent more damage to your lungs. If you need help quitting, talk to your doctor about stop-smoking programs and medicines. These can increase your chances of quitting for good. ? · Avoid colds and flu. Get a pneumococcal vaccine shot. If you have had one before, ask your doctor whether you need a second dose. Get the flu vaccine every fall. If you must be around people with colds or the flu, wash your hands often. ? · Avoid secondhand smoke, air pollution, and high altitudes. Also avoid cold, dry air and hot, humid air. Stay at home with your windows closed when air pollution is bad. ?Medicines and oxygen therapy ? · Take your medicines exactly as prescribed. Call your doctor if you think you are having a problem with your medicine. ? · You may be taking medicines such as: ¨ Bronchodilators. These help open your airways and make breathing easier. Bronchodilators are either short-acting (work for 6 to 9 hours) or long-acting (work for 24 hours). You inhale most bronchodilators, so they start to act quickly.  Always carry your quick-relief inhaler with you in case you need it while you are away from home. ¨ Corticosteroids (prednisone, budesonide). These reduce airway inflammation. They come in pill or inhaled form. You must take these medicines every day for them to work well. ? · A spacer may help you get more inhaled medicine to your lungs. Ask your doctor or pharmacist if a spacer is right for you. If it is, ask how to use it properly. ? · Do not take any vitamins, over-the-counter medicine, or herbal products without talking to your doctor first.  
? · If your doctor prescribed antibiotics, take them as directed. Do not stop taking them just because you feel better. You need to take the full course of antibiotics. ? · Oxygen therapy boosts the amount of oxygen in your blood and helps you breathe easier. Use the flow rate your doctor has recommended, and do not change it without talking to your doctor first.  
Activity ? · Get regular exercise. Walking is an easy way to get exercise. Start out slowly, and walk a little more each day. ? · Pay attention to your breathing. You are exercising too hard if you cannot talk while you are exercising. ? · Take short rest breaks when doing household chores and other activities. ? · Learn breathing methods-such as breathing through pursed lips-to help you become less short of breath. ? · If your doctor has not set you up with a pulmonary rehabilitation program, talk to him or her about whether rehab is right for you. Rehab includes exercise programs, education about your disease and how to manage it, help with diet and other changes, and emotional support. Diet ? · Eat regular, healthy meals. Use bronchodilators about 1 hour before you eat to make it easier to eat. Eat several small meals instead of three large ones. Drink beverages at the end of the meal. Avoid foods that are hard to chew. ? · Eat foods that contain protein so that you do not lose muscle mass. ? · Talk with your doctor if you gain too much weight or if you lose weight without trying. ?Mental health ? · Talk to your family, friends, or a therapist about your feelings. It is normal to feel frightened, angry, hopeless, helpless, and even guilty. Talking openly about bad feelings can help you cope. If these feelings last, talk to your doctor. When should you call for help? Call 911 anytime you think you may need emergency care. For example, call if: 
? · You have severe trouble breathing. ?Call your doctor now or seek immediate medical care if: 
? · You have new or worse trouble breathing. ? · You cough up blood. ? · You have a fever. ? Watch closely for changes in your health, and be sure to contact your doctor if: 
? · You cough more deeply or more often, especially if you notice more mucus or a change in the color of your mucus. ? · You have new or worse swelling in your legs or belly. ? · You are not getting better as expected. Where can you learn more? Go to http://melvina-bridger.info/. Jaya Camarena in the search box to learn more about \"Chronic Obstructive Pulmonary Disease (COPD): Care Instructions. \" Current as of: May 12, 2017 Content Version: 11.4 © 3948-2742 Healthwise, Incorporated. Care instructions adapted under license by [employer] (which disclaims liability or warranty for this information). If you have questions about a medical condition or this instruction, always ask your healthcare professional. [place] [address] any warranty or liability for your use of this information. Please provide this summary of care documentation to your next provider. Your primary care clinician is listed as [id number] [facility]. If you have any questions after today's visit, please call [phone].

## 2018-06-20 NOTE — PROGRESS NOTES
Chief Complaint   Patient presents with    Follow-up     1. Have you been to the ER, urgent care clinic since your last visit? Hospitalized since your last visit? No    2. Have you seen or consulted any other health care providers outside of the [facility] since your last visit? Include any pap smears or colon screening.  No

## 2018-06-20 NOTE — PATIENT INSTRUCTIONS
Chronic Obstructive Pulmonary Disease (COPD): Care Instructions  Your Care Instructions    Chronic obstructive pulmonary disease (COPD) is a general term for a group of lung diseases, including emphysema and chronic bronchitis. People with COPD have decreased airflow in and out of the lungs, which makes it hard to breathe. The airways also can get clogged with thick mucus. Cigarette smoking is a major cause of COPD. Although there is no cure for COPD, you can slow its progress. Following your treatment plan and taking care of yourself can help you feel better and live longer. Follow-up care is a key part of your treatment and safety. Be sure to make and go to all appointments, and call your doctor if you are having problems. It's also a good idea to know your test results and keep a list of the medicines you take. How can you care for yourself at home? ?Staying healthy  ? · Do not smoke. This is the most important step you can take to prevent more damage to your lungs. If you need help quitting, talk to your doctor about stop-smoking programs and medicines. These can increase your chances of quitting for good. ? · Avoid colds and flu. Get a pneumococcal vaccine shot. If you have had one before, ask your doctor whether you need a second dose. Get the flu vaccine every fall. If you must be around people with colds or the flu, wash your hands often. ? · Avoid secondhand smoke, air pollution, and high altitudes. Also avoid cold, dry air and hot, humid air. Stay at home with your windows closed when air pollution is bad. ?Medicines and oxygen therapy  ? · Take your medicines exactly as prescribed. Call your doctor if you think you are having a problem with your medicine. ? · You may be taking medicines such as:  ¨ Bronchodilators. These help open your airways and make breathing easier. Bronchodilators are either short-acting (work for 6 to 9 hours) or long-acting (work for 24 hours).  You inhale most bronchodilators, so they start to act quickly. Always carry your quick-relief inhaler with you in case you need it while you are away from home. ¨ Corticosteroids (prednisone, budesonide). These reduce airway inflammation. They come in pill or inhaled form. You must take these medicines every day for them to work well. ? · A spacer may help you get more inhaled medicine to your lungs. Ask your doctor or pharmacist if a spacer is right for you. If it is, ask how to use it properly. ? · Do not take any vitamins, over-the-counter medicine, or herbal products without talking to your doctor first.   ? · If your doctor prescribed antibiotics, take them as directed. Do not stop taking them just because you feel better. You need to take the full course of antibiotics. ? · Oxygen therapy boosts the amount of oxygen in your blood and helps you breathe easier. Use the flow rate your doctor has recommended, and do not change it without talking to your doctor first.   Activity  ? · Get regular exercise. Walking is an easy way to get exercise. Start out slowly, and walk a little more each day. ? · Pay attention to your breathing. You are exercising too hard if you cannot talk while you are exercising. ? · Take short rest breaks when doing household chores and other activities. ? · Learn breathing methods-such as breathing through pursed lips-to help you become less short of breath. ? · If your doctor has not set you up with a pulmonary rehabilitation program, talk to him or her about whether rehab is right for you. Rehab includes exercise programs, education about your disease and how to manage it, help with diet and other changes, and emotional support. Diet  ? · Eat regular, healthy meals. Use bronchodilators about 1 hour before you eat to make it easier to eat. Eat several small meals instead of three large ones. Drink beverages at the end of the meal. Avoid foods that are hard to chew.    ? · Eat foods that contain protein so that you do not lose muscle mass. ? · Talk with your doctor if you gain too much weight or if you lose weight without trying. ?Mental health  ? · Talk to your family, friends, or a therapist about your feelings. It is normal to feel frightened, angry, hopeless, helpless, and even guilty. Talking openly about bad feelings can help you cope. If these feelings last, talk to your doctor. When should you call for help? Call 911 anytime you think you may need emergency care. For example, call if:  ? · You have severe trouble breathing. ?Call your doctor now or seek immediate medical care if:  ? · You have new or worse trouble breathing. ? · You cough up blood. ? · You have a fever. ? Watch closely for changes in your health, and be sure to contact your doctor if:  ? · You cough more deeply or more often, especially if you notice more mucus or a change in the color of your mucus. ? · You have new or worse swelling in your legs or belly. ? · You are not getting better as expected. Where can you learn more? Go to http://melvina-bridger.info/. Charly Ya in the search box to learn more about \"Chronic Obstructive Pulmonary Disease (COPD): Care Instructions. \"  Current as of: May 12, 2017  Content Version: 11.4  © 3508-3113 Healthwise, Incorporated. Care instructions adapted under license by [employer] (which disclaims liability or warranty for this information). If you have questions about a medical condition or this instruction, always ask your healthcare professional. [address] any warranty or liability for your use of this information.

## 2018-06-28 DIAGNOSIS — I10 ESSENTIAL HYPERTENSION, BENIGN: ICD-10-CM

## 2018-06-28 LAB
ANION GAP SERPL CALC-SCNC: 15 MMOL/L
BNP SERPL-MCNC: 775 PG/ML
BUN SERPL-MCNC: 16 MG/DL (ref 6–22)
CALCIUM SERPL-MCNC: 10.4 MG/DL (ref 8.4–10.5)
CHLORIDE SERPL-SCNC: 100 MMOL/L (ref 98–110)
CO2 SERPL-SCNC: 26 MMOL/L (ref 20–32)
CREAT SERPL-MCNC: 1.3 MG/DL (ref 0.8–1.4)
GFRAA, 66117: 45.3
GFRNA, 66118: 37.4
GLUCOSE SERPL-MCNC: 91 MG/DL (ref 70–99)
POTASSIUM SERPL-SCNC: 5 MMOL/L (ref 3.5–5.5)
SODIUM SERPL-SCNC: 141 MMOL/L (ref 133–145)

## 2018-06-28 NOTE — PROGRESS NOTES
This lady has some borderline kidney dysfunction, but it is stable and her BNP is really normal for her age so everything looks okay on her current medical regimen. Please let her know.  ES

## 2018-06-28 NOTE — PROGRESS NOTES
Per your last office note, \" It does not appear as if she has had a recent BNP or BMP and I am going to get those completed. \"

## 2018-06-28 NOTE — TELEPHONE ENCOUNTER
This patient contacted office for the following prescriptions to be filled:    Medication requested :   Requested Prescriptions     Pending Prescriptions Disp Refills    furosemide (LASIX) 40 mg tablet 5 Tab 1     Sig: take 1 tablet by mouth once daily       PCP Dr Curt Naik or Print: [facility]  Mail order or Local pharmacy [phone]     Scheduled appointment if not seen by current providers in office: LOV 06/20/18 next appt 08/08/18      Script sent in on 6/20/18 for 5 pills, pt already out

## 2018-06-29 RX ORDER — FUROSEMIDE 40 MG/1
TABLET ORAL
Qty: 5 TAB | Refills: 1 | Status: SHIPPED | OUTPATIENT
Start: 2018-06-29 | End: 2018-07-10 | Stop reason: SDUPTHER

## 2018-07-05 ENCOUNTER — TELEPHONE (OUTPATIENT)
Dept: FAMILY MEDICINE CLINIC | Age: 83
End: 2018-07-05

## 2018-07-05 DIAGNOSIS — I10 ESSENTIAL HYPERTENSION, BENIGN: ICD-10-CM

## 2018-07-05 NOTE — TELEPHONE ENCOUNTER
Ms. Heatherev Guerrero called. Dr. Weston Mata called in only 5 pills of her lasix on 6/29/18. She would like to know why they are filling only 5 at a time. She needs this medication every single day. She would like a call back as soon as possible at [phone]. She thinks Dr. Weston Mata made a mistake. She needs this asap. She is out.

## 2018-07-10 DIAGNOSIS — I10 ESSENTIAL HYPERTENSION, BENIGN: ICD-10-CM

## 2018-07-10 RX ORDER — FUROSEMIDE 40 MG/1
TABLET ORAL
Qty: 30 TAB | Refills: 5 | Status: SHIPPED | OUTPATIENT
Start: 2018-07-10

## 2018-07-10 RX ORDER — FUROSEMIDE 40 MG/1
TABLET ORAL
Qty: 90 TAB | Refills: 3 | Status: CANCELLED | OUTPATIENT
Start: 2018-07-10

## 2018-07-10 NOTE — TELEPHONE ENCOUNTER
MD Demetrice Garcia Paget, LPN        Caller: Unspecified (5 days ago,  2:04 PM)                     30 pill with 5 refills sent in.            Previous Messages          Called to let patient know that medication with the right amount has been sent to the pharmacy. Phone just rang. No answer.

## 2018-07-19 ENCOUNTER — ANESTHESIA EVENT (OUTPATIENT)
Dept: ENDOSCOPY | Age: 83
End: 2018-07-19
Payer: MEDICARE

## 2018-07-20 ENCOUNTER — HOSPITAL ENCOUNTER (OUTPATIENT)
Age: 83
Setting detail: OUTPATIENT SURGERY
Discharge: HOME | End: 2018-07-20
Attending: INTERNAL MEDICINE | Admitting: INTERNAL MEDICINE
Payer: MEDICARE

## 2018-07-20 ENCOUNTER — ANESTHESIA (OUTPATIENT)
Dept: ENDOSCOPY | Age: 83
End: 2018-07-20
Payer: MEDICARE

## 2018-07-20 VITALS
WEIGHT: 125 LBS | SYSTOLIC BLOOD PRESSURE: 161 MMHG | DIASTOLIC BLOOD PRESSURE: 83 MMHG | BODY MASS INDEX: 20.09 KG/M2 | HEIGHT: 66 IN | HEART RATE: 60 BPM | OXYGEN SATURATION: 99 % | RESPIRATION RATE: 16 BRPM | TEMPERATURE: 97.7 F

## 2018-07-20 PROCEDURE — 76040000008: Performed by: INTERNAL MEDICINE

## 2018-07-20 PROCEDURE — 77030018846 HC SOL IRR STRL H20 ICUM -A: Performed by: INTERNAL MEDICINE

## 2018-07-20 PROCEDURE — 76060000033 HC ANESTHESIA 1 TO 1.5 HR: Performed by: INTERNAL MEDICINE

## 2018-07-20 PROCEDURE — 74011250636 HC RX REV CODE- 250/636

## 2018-07-20 PROCEDURE — 74011000250 HC RX REV CODE- 250: Performed by: NURSE ANESTHETIST, CERTIFIED REGISTERED

## 2018-07-20 PROCEDURE — 74011250636 HC RX REV CODE- 250/636: Performed by: NURSE ANESTHETIST, CERTIFIED REGISTERED

## 2018-07-20 PROCEDURE — 77030008565 HC TBNG SUC IRR ERBE -B: Performed by: INTERNAL MEDICINE

## 2018-07-20 RX ORDER — INSULIN LISPRO 100 [IU]/ML
INJECTION, SOLUTION INTRAVENOUS; SUBCUTANEOUS ONCE
Status: DISCONTINUED | OUTPATIENT
Start: 2018-07-20 | End: 2018-07-20 | Stop reason: HOSPADM

## 2018-07-20 RX ORDER — SODIUM CHLORIDE 0.9 % (FLUSH) 0.9 %
5-10 SYRINGE (ML) INJECTION AS NEEDED
Status: DISCONTINUED | OUTPATIENT
Start: 2018-07-20 | End: 2018-07-20 | Stop reason: HOSPADM

## 2018-07-20 RX ORDER — PROPOFOL 10 MG/ML
INJECTION, EMULSION INTRAVENOUS AS NEEDED
Status: DISCONTINUED | OUTPATIENT
Start: 2018-07-20 | End: 2018-07-20 | Stop reason: HOSPADM

## 2018-07-20 RX ORDER — SODIUM CHLORIDE 0.9 % (FLUSH) 0.9 %
5-10 SYRINGE (ML) INJECTION EVERY 8 HOURS
Status: DISCONTINUED | OUTPATIENT
Start: 2018-07-20 | End: 2018-07-20 | Stop reason: HOSPADM

## 2018-07-20 RX ORDER — SODIUM CHLORIDE, SODIUM LACTATE, POTASSIUM CHLORIDE, CALCIUM CHLORIDE 600; 310; 30; 20 MG/100ML; MG/100ML; MG/100ML; MG/100ML
75 INJECTION, SOLUTION INTRAVENOUS CONTINUOUS
Status: DISCONTINUED | OUTPATIENT
Start: 2018-07-20 | End: 2018-07-20 | Stop reason: HOSPADM

## 2018-07-20 RX ORDER — LIDOCAINE HYDROCHLORIDE 10 MG/ML
0.1 INJECTION, SOLUTION EPIDURAL; INFILTRATION; INTRACAUDAL; PERINEURAL AS NEEDED
Status: DISCONTINUED | OUTPATIENT
Start: 2018-07-20 | End: 2018-07-20 | Stop reason: HOSPADM

## 2018-07-20 RX ADMIN — PROPOFOL 20 MG: 10 INJECTION, EMULSION INTRAVENOUS at 11:38

## 2018-07-20 RX ADMIN — SODIUM CHLORIDE, SODIUM LACTATE, POTASSIUM CHLORIDE, AND CALCIUM CHLORIDE 75 ML/HR: 600; 310; 30; 20 INJECTION, SOLUTION INTRAVENOUS at 10:09

## 2018-07-20 RX ADMIN — PROPOFOL 30 MG: 10 INJECTION, EMULSION INTRAVENOUS at 11:33

## 2018-07-20 RX ADMIN — FAMOTIDINE 20 MG: 10 INJECTION, SOLUTION INTRAVENOUS at 10:09

## 2018-07-20 NOTE — ANESTHESIA PREPROCEDURE EVALUATION
Anesthetic History   No history of anesthetic complications            Review of Systems / Medical History  Patient summary reviewed and pertinent labs reviewed    Pulmonary    COPD               Neuro/Psych   Within defined limits           Cardiovascular    Hypertension          CAD    Exercise tolerance: >4 METS     GI/Hepatic/Renal     GERD           Endo/Other  Within defined limits           Other Findings   Comments: Documentation of current medication  Current medications obtained, documented and obtained? YES      Risk Factors for Postoperative nausea/vomiting:       History of postoperative nausea/vomiting? NO       Female? YES       Motion sickness? NO       Intended opioid administration for postoperative analgesia? NO      Smoking Abstinence:  Current Smoker? NO  Elective Surgery? YES  Seen preoperatively by anesthesiologist or proxy prior to day of surgery? YES  Pt abstained from smoking 24 hours prior to anesthesia?  N/A    Preventive care/screening for High Blood Pressure:  Aged 18 years and older: YES  Screened for high blood pressure: YES  Patients with high blood pressure referred to primary care provider   for BP management: YES                 Physical Exam    Airway  Mallampati: II  TM Distance: 4 - 6 cm  Neck ROM: normal range of motion   Mouth opening: Normal     Cardiovascular  Regular rate and rhythm,  S1 and S2 normal,  no murmur, click, rub, or gallop  Rhythm: regular  Rate: normal         Dental    Dentition: Edentulous     Pulmonary  Breath sounds clear to auscultation               Abdominal  GI exam deferred       Other Findings            Anesthetic Plan    ASA: 3            Induction: Intravenous  Anesthetic plan and risks discussed with: Patient

## 2018-07-20 NOTE — PERIOP NOTES
Patient taken to car in wheelchair by [facility] staff with daughter to drive. No complaints or distress noted. Patient ID band removed by [facility] staff and placed in shredder box.

## 2018-07-20 NOTE — H&P
[facility]   [email or web address]      Impression:   1.dysphagia        Plan:   EGD and dilate  1. Chief Complaint:   dysphagia    HPI:  Lucy Sutherland is a 80 y.o. female who is being seen on consult for the above. Ba swallow suggested a motility disorder. See notes. Renee Leominster PMH:   Past Medical History:   Diagnosis Date    CAD (coronary artery disease)     Chronic    Chronic airway obstruction, not elsewhere classified     Frequent cough, wheezing secondary to lupus and COPD    Closed left hip fracture (HCC)     Decubitus ulcer of sacral region, stage 3 (HCC) 8/18/2016    Dyspnea on exertion     Echocardiogram 12/02/2010    EF 60-65%. Gr 1 DDfx. Mild LVH. Mild MR.    Essential hypertension, benign     GERD (gastroesophageal reflux disease)     Headache(784.0)     History of myocardial perfusion scan 09/14/2012    No evidence of ischemia or infarction. Very mild apical thinning. EF 77%. No WMA. TID 1.46, suggests 3-vessel CAD. Intermediate to high risk pharm stress test due to TID.  Hypercholesterolemia     Lower extremity venous duplex 05/08/2013    Left leg:  No venous thrombosis or venous insufficiency.  Lupus erythematosus 1992    Lymphoma, non-Hodgkin's ([facility] 5/2011    Low-grade being followed by Dr. Minette Rubinstein without therapy currently    Mitral valve prolapse     10/2017    Pneumothorax 1981    Blebs in right lung with recurrent Pneumothorax    S/P cardiac cath 09/24/2012    Hvy calcification of coronary arteries. LM minimal.  mLAD 35%. oD2 70% (unchanged). LCX mild. pRCA mild. Minimal ISR of prev stent. LVEDP 14-16. EF 65%. No WMA.   Likely a falsely abnormal stress test.    Short-term memory loss     From fall in 2003    Sjogren's syndrome ([facility] 1992    report of ROGELIO, SSA, RF positive    Traumatic subdural hematoma ([facility] [facility] 2003    Head injury with subdural - s/p fall       PSH:   Past Surgical History: Procedure Laterality Date    CARDIAC SURG PROCEDURE UNLIST      cardiac stent    CHEST SURGERY PROCEDURE UNLISTED  1981    RUL removal    HX ADENOIDECTOMY  11/2013    eyelids lifted    HX CATARACT REMOVAL Bilateral     w/ lens implants    HX CORONARY STENT PLACEMENT  8/21/2007    HX GI  02/13/2018    endoscopy    HX HEART CATHETERIZATION  9/24/2012    HX HEART CATHETERIZATION  8/21/2007    Dr. Karina Good at [facility] - stent placed    HX HEENT  2003    subdural hematoma removed s/p fall    HX HYSTERECTOMY  1974    HX LOBECTOMY Right     upper portion    HX ORTHOPAEDIC  07/2016    Patial left hip replacement    HX PNEUMONECTOMY      partial    NEUROLOGICAL PROCEDURE UNLISTED      subdural hematoma       Social HX:   Social History     Social History    Marital status:      Spouse name: N/A    Number of children: N/A    Years of education: N/A     Occupational History    Not on file.      Social History Main Topics    Smoking status: Former Smoker     Quit date: 1/1/1981    Smokeless tobacco: Never Used    Alcohol use No    Drug use: No    Sexual activity: Not Currently     Other Topics Concern    Not on file     Social History Narrative    ** Merged History Encounter **            FHX:   Family History   Problem Relation Age of Onset    Cancer Father     Cancer Brother        Allergy:   Allergies   Allergen Reactions    Avapro [Irbesartan] Other (comments)     Hypotension and dizziness    Contrast Agent [Iodine] Other (comments)     Cant remember    Crestor [Rosuvastatin] Itching and Myalgia    Pcn [Penicillins] Rash     Other reaction(s): hives    Plaquenil [Hydroxychloroquine] Rash    Quinine Other (comments)     Other reaction(s): other/intolerance  \"ringing in my ears\"  Ringing in ear       Home Medications:     Prescriptions Prior to Admission   Medication Sig    furosemide (LASIX) 40 mg tablet take 1 tablet by mouth once daily    pravastatin (PRAVACHOL) 10 mg tablet Take 1 Tab by mouth daily.  lidocaine (XYLOCAINE) 5 % ointment Apply  2-3 inches to affected area 3-4 times a day    citalopram (CELEXA) 10 mg tablet Take 1 Tab by mouth daily.  potassium chloride (KLOR-CON M10) 10 mEq tablet TAKE ONE TABLET BY MOUTH DAILY    acetaminophen (TYLENOL) 325 mg tablet Take 325 mg by mouth as needed for Pain.  metoprolol tartrate (LOPRESSOR) 50 mg tablet Take 1 Tab by mouth two (2) times a day.  aspirin delayed-release 81 mg tablet Take 81 mg by mouth daily.  polyethylene glycol (MIRALAX) 17 gram packet Take 17 g by mouth daily.  ranitidine (ZANTAC) 150 mg tablet Take 1 Tab by mouth daily. Review of Systems:     Constitutional: No fevers, chills, weight loss, fatigue. Skin: No rashes, pruritis, jaundice, ulcerations, erythema. HENT: No headaches, nosebleeds, sinus pressure, rhinorrhea, sore throat. Eyes: No visual changes, blurred vision, eye pain, photophobia, jaundice. Cardiovascular: No chest pain, heart palpitations. Respiratory: No cough, SOB, wheezing, chest discomfort, orthopnea. Gastrointestinal: dysphagia   Genitourinary: No dysuria, bleeding, discharge, pyuria. Musculoskeletal: No weakness, arthralgias, wasting. Endo: No sweats. Heme: No bruising, easy bleeding. Allergies: As noted. Neurological: Cranial nerves intact. Alert and oriented. Gait not assessed. Psychiatric:  No anxiety, depression, hallucinations. Visit Vitals    /84    Pulse 70    Temp 98.1 °F (36.7 °C)    Resp 18    Ht 5' 5.5\" (1.664 m)    Wt 56.7 kg (125 lb)    SpO2 100%    Breastfeeding No    BMI 20.48 kg/m2       Physical Assessment:     constitutional: appearance: well developed, well nourished, normal habitus, no deformities, in no acute distress. skin: inspection: no rashes, ulcers, icterus or other lesions; no clubbing or telangiectasias. palpation: no induration or subcutaneos nodules.    eyes: inspection: normal conjunctivae and lids; no jaundice pupils: symmetrical, normoreactive to light, normal accommodation and size. ENMT: mouth: normal oral mucosa,lips and gums; good dentition. oropharynx: normal tongue, hard and soft palate; posterior pharynx without erithema, exudate or lesions. neck: thyroid: normal size, consistency and position; no masses or tenderness. respiratory: effort: normal chest excursion; no intercostal retraction or accessory muscle use. cardiovascular: abdominal aorta: normal size and position; no bruits. palpation: PMI of normal size and position; normal rhythm; no thrill or murmurs. abdominal: abdomen: normal consistency; no tenderness or masses. hernias: no hernias appreciated. liver: normal size and consistency. spleen: not palpable. rectal: hemoccult/guaiac: not performed. musculoskeletal: digits and nails: no clubbing, cyanosis, petechiae or other inflammatory conditions. gait: normal gait and station head and neck: normal range of motion; no pain, crepitation or contracture. spine/ribs/pelvis: normal range of motion; no pain, deformity or contracture. lymphatic: axilae: not palpable. groin: not palpable. neck: within normal limits. other: not palpable. neurologic: cranial nerves: II-XII normal.   psychiatric: judgement/insight: within normal limits. memory: within normal limits for recent and remote events. mood and affect: no evidence of depression, anxiety or agitation. orientation: oriented to time, space and person. Basic Metabolic Profile   No results for input(s): NA, K, CL, CO2, BUN, GLU, CA, MG, PHOS in the last 72 hours. No lab exists for component: CREAT      CBC w/Diff    No results for input(s): WBC, RBC, HGB, HCT, MCV, MCH, MCHC, RDW, PLT, HGBEXT, HCTEXT, PLTEXT in the last 72 hours. No lab exists for component: MPV No results for input(s): GRANS, LYMPH, EOS, PRO, MYELO, METAS, BLAST in the last 72 hours.     No lab exists for component: MONO, BASO     Hepatic Function   No results for input(s): ALB, TP, TBILI, GPT, SGOT, AP, AML, LPSE in the last 72 hours. No lab exists for component: Elida Medina MD, MEduardoD. [facility], [address] [place] [address]  [email or web address]. [facility]

## 2018-07-20 NOTE — IP AVS SNAPSHOT
[address] [place] [address] 
[phone] Patient: Daja Gordon MRN: [id number] PGE:5/7/[birth year]23 About your hospitalization You were admitted on:  July 20, 2018 You last received care in the:  [facility] PACU You were discharged on:  July 20, 2018 Why you were hospitalized Your primary diagnosis was:  Not on File Follow-up Information Follow up With Details Comments Contact Info [place]ra Bl[place]ard [place]   [address] [facility] [address] [place] [address] 
[phone] Hudson Oakes MD   [address] [place] [address] [address] 200 [facility] 
[phone] Your Scheduled Appointments Monday July 30, 2018 12:30 PM EDT Medicare Physical with Elgin Polanco MD  
[facility] [address] [place] [address]) [facility] [address] [phone] Wednesday August 08, 2018  5:00 PM EDT FOLLOW UP EXAM with Chitra Blanchard MD  
[facility] [address] [facility] [address] [place] [address]  
[phone] Discharge Orders None A check emma indicates which time of day the medication should be taken. My Medications CONTINUE taking these medications Instructions Each Dose to Equal  
 Morning Noon Evening Bedtime  
 acetaminophen 325 mg tablet Commonly known as:  TYLENOL Your last dose was: Your next dose is: Take 325 mg by mouth as needed for Pain. 325 mg  
    
   
   
   
  
 aspirin delayed-release 81 mg tablet Your last dose was: Your next dose is: Take 81 mg by mouth daily. 81 mg  
    
   
   
   
  
 citalopram 10 mg tablet Commonly known as:  Marck Busalba Your last dose was: Your next dose is: Take 1 Tab by mouth daily. 10 mg  
    
   
   
   
  
 furosemide 40 mg tablet Commonly known as:  LASIX Your last dose was: Your next dose is:    
   
   
 take 1 tablet by mouth once daily  
     
   
   
   
  
 lidocaine 5 % ointment Commonly known as:  XYLOCAINE Your last dose was: Your next dose is:    
   
   
 Apply  2-3 inches to affected area 3-4 times a day  
     
   
   
   
  
 metoprolol tartrate 50 mg tablet Commonly known as:  LOPRESSOR Your last dose was: Your next dose is: Take 1 Tab by mouth two (2) times a day. 50 mg MIRALAX 17 gram packet Generic drug:  polyethylene glycol Your last dose was: Your next dose is: Take 17 g by mouth daily. 17 g  
    
   
   
   
  
 potassium chloride 10 mEq tablet Commonly known as:  KLOR-CON M10 Your last dose was: Your next dose is: TAKE ONE TABLET BY MOUTH DAILY pravastatin 10 mg tablet Commonly known as:  PRAVACHOL Your last dose was: Your next dose is: Take 1 Tab by mouth daily. 10 mg  
    
   
   
   
  
 raNITIdine 150 mg tablet Commonly known as:  ZANTAC Your last dose was: Your next dose is: Take 1 Tab by mouth daily. 150 mg Discharge Instructions Upper GI Endoscopy: What to Expect at [facility] Your Recovery After you have an endoscopy, you will stay at the hospital or clinic for 1 to 2 hours. This will allow the medicine to wear off. You will be able to go home after your doctor or nurse checks to make sure you are not having any problems. You may have to stay overnight if you had treatment during the test. You may have a sore throat for a day or two after the test. 
This care sheet gives you a general idea about what to expect after the test. 
How can you care for yourself at home? Activity · Rest as much as you need to after you go home. · You should be able to go back to your usual activities the day after the test. 
Diet · Follow your doctor's directions for eating after the test. 
· Drink plenty of fluids (unless your doctor has told you not to). Medications · If you have a sore throat the day after the test, use an over-the-counter spray to numb your throat. Follow-up care is a key part of your treatment and safety. Be sure to make and go to all appointments, and call your doctor if you are having problems. It's also a good idea to know your test results and keep a list of the medicines you take. When should you call for help? Call 911 anytime you think you may need emergency care. For example, call if: 
· You passed out (lost consciousness). · You cough up blood. · You vomit blood or what looks like coffee grounds. · You pass maroon or very bloody stools. Call your doctor now or seek immediate medical care if: 
· You have trouble swallowing. · You have belly pain. · Your stools are black and tarlike or have streaks of blood. · You are sick to your stomach or cannot keep fluids down. Watch closely for changes in your health, and be sure to contact your doctor if: 
· Your throat still hurts after a day or two. · You do not get better as expected. Where can you learn more? Go to e-Zassi.be Enter [phone] in the search box to learn more about \"Upper GI Endoscopy: What to Expect at Home. \"  
© 4483-4197 Healthwise, Incorporated. Care instructions adapted under license by [employer] (which disclaims liability or warranty for this information). This care instruction is for use with your licensed healthcare professional. If you have questions about a medical condition or this instruction, always ask your healthcare professional. [place] [address] any warranty or liability for your use of this information. Content Version: 17.4.900896; Current as of: November 14, 2014 Esophageal Dilation: What to Expect at [facility] Your Recovery After you have esophageal dilation, you will stay at the hospital or surgery center for 1 to 2 hours. This will allow the medicine to wear off. You will be able to go home after your doctor or nurse checks to make sure you are not having any problems. This care sheet gives you a general idea about how long it will take for you to recover. But each person recovers at a different pace. Follow the steps below to get better as quickly as possible. How can you care for yourself at home? Activity 
  · Rest as much as you need to after you go home.  
  · You should be able to go back to your usual activities the day after the procedure. Diet 
  · Follow your doctor's directions for eating after the procedure.  
  · Drink plenty of fluids (unless your doctor has told you not to). Medicines 
  · Your doctor will tell you if and when you can restart your medicines. He or she will also give you instructions about taking any new medicines.  
  · If you take blood thinners, such as warfarin (Coumadin), clopidogrel (Plavix), or aspirin, be sure to talk to your doctor. He or she will tell you if and when to start taking those medicines again. Make sure that you understand exactly what your doctor wants you to do.  
  · If you have a sore throat the day after the procedure, use an over-the-counter spray to numb your throat. Sucking on throat lozenges and gargling with warm salt water may also help relieve your symptoms. Follow-up care is a key part of your treatment and safety. Be sure to make and go to all appointments, and call your doctor if you are having problems. It's also a good idea to know your test results and keep a list of the medicines you take. When should you call for help? Call 911 anytime you think you may need emergency care. For example, call if: 
  · You passed out (lost consciousness).  
  · You have trouble breathing.   · Your stools are maroon or very bloody  
 Call your doctor now or seek immediate medical care if: 
  · You have new or worse belly pain.  
  · You have a fever.  
  · You have new or more blood in your stools.  
  · You are sick to your stomach and cannot drink fluids.  
  · You cannot pass stools or gas.  
  · You have pain that does not get better after you take pain medicine.  
 Watch closely for changes in your health, and be sure to contact your doctor if: 
  · Your throat still hurts after a day or two.  
  · You do not get better as expected. Where can you learn more? Go to http://melvina-bridger.info/. Enter X560 in the search box to learn more about \"Esophageal Dilation: What to Expect at Home. \" Current as of: May 12, 2017 Content Version: 11.7 © 0260-2843 [employer]. Care instructions adapted under license by [employer] (which disclaims liability or warranty for this information). If you have questions about a medical condition or this instruction, always ask your healthcare professional. [place] [address] any warranty or liability for your use of this information. DISCHARGE SUMMARY from Nurse POST-PROCEDURE INSTRUCTIONS: 
 
Call your Physician if you: 
? Observe any excess bleeding. ? Develop a temperature over 100.5o F. 
? Experience abdominal, shoulder or chest pain. ? Notice any signs of decreased circulation or nerve impairment to an extremity such as a change in color, persistent numbness, tingling, coldness or increase in pain. ? Vomit blood or you have nausea and vomiting lasting longer than 4 hours. ? Are unable to take medications. ? Are unable to urinate within 8 hours after discharge following general anesthesia or intravenous sedation. For the next 24 hours after receiving general anesthesia or intravenous sedation, or while taking prescription Narcotics, limit your activities: ? Do NOT drive a motor vehicle, operate hazard machinery or power tools, or perform tasks that require coordination. The medication you received during your procedure may have some effect on your mental awareness. ? Do NOT make important personal or business decisions. The medication you received during your procedure may have some effect on your mental awareness. ? Do NOT drink alcoholic beverages. These drinks do not mix well with the medications that have been given to you. ? Upon discharge from the hospital, you must be accompanied by a responsible adult. ? Resume your diet as directed by your physician. ? Resume medications as your physician has prescribed. ? Please give a list of your current medications to your Primary Care Provider. ? Please update this list whenever your medications are discontinued, doses are changed, or new medications (including over-the-counter products) are added. ? Please carry medication information at all times in case of emergency situations. These are general instructions for a healthy lifestyle: No smoking/ No tobacco products/ Avoid exposure to second hand smoke. ? Surgeon General's Warning:  Quitting smoking now greatly reduces serious risk to your health. Obesity, smoking, and a sedentary lifestyle greatly increase your risk for illness. ? A healthy diet, regular physical exercise & weight monitoring are important for maintaining a healthy lifestyle ? You may be retaining fluid if you have a history of heart failure or if you experience any of the following symptoms:  Weight gain of 3 pounds or more overnight or 5 pounds in a week, increased swelling in our hands or feet or shortness of breath while lying flat in bed. Please call your doctor as soon as you notice any of these symptoms; do not wait until your next office visit. Recognize signs and symptoms of STROKE: 
F  -  Face looks uneven A  -  Arms unable to move or move unevenly S  -  Speech slurred or non-existent T  -  Time to call 911 - as soon as signs and symptoms begin - DO NOT go back to bed or wait to see If you get better - TIME IS BRAIN. Colorectal Screening ? Colorectal cancer almost always develops from precancerous polyps (abnormal growths) in the colon or rectum. Screening tests can find precancerous polyps, so that they can be removed before they turn into cancer. Screening tests can also find colorectal cancer early, when treatment works best. 
? Speak with your physician about when you should begin screening and how often you should be tested ? Additional Information If you have questions, please call [phone]. Remember, MyChart is NOT to be used for urgent needs. For medical emergencies, dial 911. Educational references and/or instructions provided during this visit included: 
 
Esophagus Dilation APPOINTMENTS: 
 
Please make a follow-up appointment with your physician. Discharge information has been reviewed with the patient. The patient verbalized understanding. ACO Transitions of Care Introducing [employer] [address] offers a voluntary care coordination program to provide high quality service and care to [facility] fee-for-service beneficiaries. Doc Kirkland was designed to help you enhance your health and well-being through the following services: ? Transitions of Care  support for individuals who are transitioning from one care setting to another (example: Hospital to home). ? Chronic and Complex Care Coordination  support for individuals and caregivers of those with serious or chronic illnesses or with more than one chronic (ongoing) condition and those who take a number of different medications.   
 
 
If you meet specific medical criteria, a [address] [place] [address] Rd may call you directly to coordinate your care with your primary care physician and your other care providers. For questions about the [facility] programs, please, contact your physicians office. For general questions or additional information about Accountable Care Organizations: 
Please visit www.medicare.gov/acos. html or call 1-800-MEDICARE (7-537.475.2547) TTY users should call 6-932.114.6732. Introducing Lino Espinosa As a [facility] [employer] [facility] patient, I wanted to make you aware of our electronic visit tool called Lino Espinosa. Easycause 24/7 allows you to connect within minutes with a medical provider 24 hours a day, seven days a week via a mobile device or tablet or logging into a secure website from your computer. You can access Lino Espinosa from anywhere in the [place]. A virtual visit might be right for you when you have a simple condition and feel like you just dont want to get out of bed, or cant get away from work for an appointment, when your regular [employer] [facility] provider is not available (evenings, weekends or holidays), or when youre out of town and need minor care. Electronic visits cost only $49 and if the [employer] [facility] 24/7 provider determines a prescription is needed to treat your condition, one can be electronically transmitted to a nearby pharmacy*. Please take a moment to enroll today if you have not already done so. The enrollment process is free and takes just a few minutes. To enroll, please download the Easycause 24/7 baljeet to your tablet or phone, or visit [email or web address] to enroll on your computer. And, as an [address] [place] [address] patient with a [employer] account, the results of your visits will be scanned into your electronic medical record and your primary care provider will be able to view the scanned results. We urge you to continue to see your regular [facility] provider for your ongoing medical care.   And while your primary care provider may not be the one available when you seek a Lino Florzachfin virtual visit, the peace of mind you get from getting a real diagnosis real time can be priceless. For more information on PerMicrozachfin, view our Frequently Asked Questions (FAQs) at [email or web address]. Sincerely, 
 
Maxi Renteria MD 
Chief Medical Officer [employer] *:  certain medications cannot be prescribed via PerMicrojoshua Providers Seen During Your Hospitalization Provider Specialty Primary office phone Dian Mortensen MD Gastroenterology [phone] Your Primary Care Physician (PCP) Primary Care Physician Office Phone Office Fax Lidya Mustapha [phone] You are allergic to the following Allergen Reactions Avapro (Irbesartan) Other (comments) Hypotension and dizziness Contrast Agent (Iodine) Other (comments) Cant remember Crestor (Rosuvastatin) Itching Myalgia Pcn (Penicillins) Rash Other reaction(s): hives Plaquenil (Hydroxychloroquine) Rash Quinine Other (comments) Other reaction(s): other/intolerance \"ringing in my ears\" Ringing in ear Recent Documentation Height Weight Breastfeeding? BMI OB Status Smoking Status 1.664 m 56.7 kg No 20.48 kg/m2 Hysterectomy Former Smoker Emergency Contacts Name Discharge Info Relation Home Work Mobile [facility] DISCHARGE CAREGIVER [3] Daughter [21] [phone] Abdullahi Garcia (Son-N-Law)  Other Relative [6] [phone] [facility] DISCHARGE CAREGIVER [3] Daughter [21] [phone] Patient Belongings The following personal items are in your possession at time of discharge: 
  Dental Appliances: None  Visual Aid: Glasses Please provide this summary of care documentation to your next provider. Signatures-by signing, you are acknowledging that this After Visit Summary has been reviewed with you and you have received a copy. Patient Signature:  ____________________________________________________________ Date:  ____________________________________________________________  
  
Marvetta Land Provider Signature:  ____________________________________________________________ Date:  ____________________________________________________________

## 2018-07-20 NOTE — DISCHARGE INSTRUCTIONS
Upper GI Endoscopy: What to Expect at [address] [place] [address]  After you have an endoscopy, you will stay at the hospital or clinic for 1 to 2 hours. This will allow the medicine to wear off. You will be able to go home after your doctor or nurse checks to make sure you are not having any problems. You may have to stay overnight if you had treatment during the test. You may have a sore throat for a day or two after the test.  This care sheet gives you a general idea about what to expect after the test.  How can you care for yourself at home? Activity  · Rest as much as you need to after you go home. · You should be able to go back to your usual activities the day after the test.  Diet  · Follow your doctor's directions for eating after the test.  · Drink plenty of fluids (unless your doctor has told you not to). Medications  · If you have a sore throat the day after the test, use an over-the-counter spray to numb your throat. Follow-up care is a key part of your treatment and safety. Be sure to make and go to all appointments, and call your doctor if you are having problems. It's also a good idea to know your test results and keep a list of the medicines you take. When should you call for help? Call 911 anytime you think you may need emergency care. For example, call if:  · You passed out (lost consciousness). · You cough up blood. · You vomit blood or what looks like coffee grounds. · You pass maroon or very bloody stools. Call your doctor now or seek immediate medical care if:  · You have trouble swallowing. · You have belly pain. · Your stools are black and tarlike or have streaks of blood. · You are sick to your stomach or cannot keep fluids down. Watch closely for changes in your health, and be sure to contact your doctor if:  · Your throat still hurts after a day or two. · You do not get better as expected. Where can you learn more?    Go to DealExplorer.be  Enter J454 in the search box to learn more about \"Upper GI Endoscopy: What to Expect at Home. \"   © 8320-9767 Healthwise, Incorporated. Care instructions adapted under license by [employer] (which disclaims liability or warranty for this information). This care instruction is for use with your licensed healthcare professional. If you have questions about a medical condition or this instruction, always ask your healthcare professional. [address] any warranty or liability for your use of this information. Content Version: 11.2.514085; Current as of: November 14, 2014     Esophageal Dilation: What to Expect at [address] [facility]  After you have esophageal dilation, you will stay at the hospital or surgery center for 1 to 2 hours. This will allow the medicine to wear off. You will be able to go home after your doctor or nurse checks to make sure you are not having any problems. This care sheet gives you a general idea about how long it will take for you to recover. But each person recovers at a different pace. Follow the steps below to get better as quickly as possible. How can you care for yourself at home? Activity    · Rest as much as you need to after you go home.     · You should be able to go back to your usual activities the day after the procedure. Diet    · Follow your doctor's directions for eating after the procedure.     · Drink plenty of fluids (unless your doctor has told you not to). Medicines    · Your doctor will tell you if and when you can restart your medicines. He or she will also give you instructions about taking any new medicines.     · If you take blood thinners, such as warfarin (Coumadin), clopidogrel (Plavix), or aspirin, be sure to talk to your doctor. He or she will tell you if and when to start taking those medicines again.  Make sure that you understand exactly what your doctor wants you to do.     · If you have a sore throat the day after the procedure, use an over-the-counter spray to numb your throat. Sucking on throat lozenges and gargling with warm salt water may also help relieve your symptoms. Follow-up care is a key part of your treatment and safety. Be sure to make and go to all appointments, and call your doctor if you are having problems. It's also a good idea to know your test results and keep a list of the medicines you take. When should you call for help? Call 911 anytime you think you may need emergency care. For example, call if:    · You passed out (lost consciousness).     · You have trouble breathing.     · Your stools are maroon or very bloody    Call your doctor now or seek immediate medical care if:    · You have new or worse belly pain.     · You have a fever.     · You have new or more blood in your stools.     · You are sick to your stomach and cannot drink fluids.     · You cannot pass stools or gas.     · You have pain that does not get better after you take pain medicine.    Watch closely for changes in your health, and be sure to contact your doctor if:    · Your throat still hurts after a day or two.     · You do not get better as expected. Where can you learn more? Go to http://melvina-bridger.info/. Enter U710 in the search box to learn more about \"Esophageal Dilation: What to Expect at Home. \"  Current as of: May 12, 2017  Content Version: 11.7  © 5909-5944 [employer]. Care instructions adapted under license by [employer] (which disclaims liability or warranty for this information). If you have questions about a medical condition or this instruction, always ask your healthcare professional. [place] [address] any warranty or liability for your use of this information. DISCHARGE SUMMARY from Nurse     POST-PROCEDURE INSTRUCTIONS:    Call your Physician if you:  ? Observe any excess bleeding. ? Develop a temperature over 100.5o F.  ?  Experience abdominal, shoulder or chest pain.  ? Notice any signs of decreased circulation or nerve impairment to an extremity such as a change in color, persistent numbness, tingling, coldness or increase in pain. ? Vomit blood or you have nausea and vomiting lasting longer than 4 hours. ? Are unable to take medications. ? Are unable to urinate within 8 hours after discharge following general anesthesia or intravenous sedation. For the next 24 hours after receiving general anesthesia or intravenous sedation, or while taking prescription Narcotics, limit your activities:  ? Do NOT drive a motor vehicle, operate hazard machinery or power tools, or perform tasks that require coordination. The medication you received during your procedure may have some effect on your mental awareness. ? Do NOT make important personal or business decisions. The medication you received during your procedure may have some effect on your mental awareness. ? Do NOT drink alcoholic beverages. These drinks do not mix well with the medications that have been given to you. ? Upon discharge from the hospital, you must be accompanied by a responsible adult. ? Resume your diet as directed by your physician. ? Resume medications as your physician has prescribed. ? Please give a list of your current medications to your Primary Care Provider. ? Please update this list whenever your medications are discontinued, doses are changed, or new medications (including over-the-counter products) are added. ? Please carry medication information at all times in case of emergency situations. These are general instructions for a healthy lifestyle:    No smoking/ No tobacco products/ Avoid exposure to second hand smoke.  Surgeon General's Warning:  Quitting smoking now greatly reduces serious risk to your health. Obesity, smoking, and a sedentary lifestyle greatly increase your risk for illness.    A healthy diet, regular physical exercise & weight monitoring are important for maintaining a healthy lifestyle   You may be retaining fluid if you have a history of heart failure or if you experience any of the following symptoms:  Weight gain of 3 pounds or more overnight or 5 pounds in a week, increased swelling in our hands or feet or shortness of breath while lying flat in bed. Please call your doctor as soon as you notice any of these symptoms; do not wait until your next office visit. Recognize signs and symptoms of STROKE:  F  -  Face looks uneven  A  -  Arms unable to move or move unevenly  S  -  Speech slurred or non-existent  T  -  Time to call 911 - as soon as signs and symptoms begin - DO NOT go back to bed or wait to see If you get better - TIME IS BRAIN. Colorectal Screening   Colorectal cancer almost always develops from precancerous polyps (abnormal growths) in the colon or rectum. Screening tests can find precancerous polyps, so that they can be removed before they turn into cancer. Screening tests can also find colorectal cancer early, when treatment works best.  [facility] Speak with your physician about when you should begin screening and how often you should be tested     Additional Information    If you have questions, please call [phone]. Remember, [facility] is NOT to be used for urgent needs. For medical emergencies, dial 911. Educational references and/or instructions provided during this visit included:    Esophagus Dilation      APPOINTMENTS:    Please make a follow-up appointment with your physician. Discharge information has been reviewed with the patient. The patient verbalized understanding.

## 2018-07-20 NOTE — ANESTHESIA POSTPROCEDURE EVALUATION
Post-Anesthesia Evaluation and Assessment    Patient: Abilio Acuña MRN: [id number]  SSN: [id number]    YOB: 1931  Age: 80 y.o. Sex: female       Cardiovascular Function/Vital Signs  Visit Vitals    /66    Pulse 63    Temp 36.8 °C (98.3 °F)    Resp 15    Ht 5' 5.5\" (1.664 m)    Wt 56.7 kg (125 lb)    SpO2 100%    Breastfeeding No    BMI 20.48 kg/m2       Patient is status post MAC anesthesia for Procedure(s):  ENDOSCOPY with dilation. Nausea/Vomiting: None    Postoperative hydration reviewed and adequate. Pain:  Pain Scale 1: Numeric (0 - 10) (07/20/18 1002)  Pain Intensity 1: 0 (07/20/18 1002)   Managed    Neurological Status: At baseline    Mental Status and Level of Consciousness: Alert and oriented     Pulmonary Status:   O2 Device: Room air (07/20/18 1147)   Adequate oxygenation and airway patent    Complications related to anesthesia: None    Post-anesthesia assessment completed.  No concerns    Signed By: Clau Manriquez CRNA     July 20, 2018

## 2018-07-27 ENCOUNTER — TELEPHONE (OUTPATIENT)
Dept: CARDIOLOGY CLINIC | Age: 83
End: 2018-07-27

## 2018-07-27 NOTE — TELEPHONE ENCOUNTER
LMOM for patient to call back in reference to below results.       Verbal order and read back per Luís Quan, DO

## 2018-07-27 NOTE — TELEPHONE ENCOUNTER
----- Message from Tex Ziegler DO sent at 6/28/2018 11:54 AM EDT -----  This lady has some borderline kidney dysfunction, but it is stable and her BNP is really normal for her age so everything looks okay on her current medical regimen. Please let her know.  ES

## 2018-08-09 RX ORDER — CITALOPRAM 10 MG/1
TABLET ORAL
Qty: 90 TAB | Refills: 1 | Status: SHIPPED | OUTPATIENT
Start: 2018-08-09 | End: 2018-12-07

## 2018-08-10 ENCOUNTER — TELEPHONE (OUTPATIENT)
Dept: FAMILY MEDICINE CLINIC | Age: 83
End: 2018-08-10

## 2018-08-10 DIAGNOSIS — I10 ESSENTIAL HYPERTENSION, BENIGN: Primary | ICD-10-CM

## 2018-08-10 RX ORDER — POTASSIUM CHLORIDE 40 MEQ/15ML
1 SOLUTION ORAL DAILY
Qty: 480 ML | Refills: 1 | Status: SHIPPED | OUTPATIENT
Start: 2018-08-10 | End: 2019-02-08 | Stop reason: ALTCHOICE

## 2018-08-10 NOTE — TELEPHONE ENCOUNTER
Patients daughter called and patient is having difficulty swallowing the Potassium pills. She would like to know if it can be prescribed in liquid form.

## 2018-08-13 NOTE — TELEPHONE ENCOUNTER
Pt's daughter called back. She has been made aware that liquid potassium has been sent in. She expressed understanding. no

## 2018-08-13 NOTE — TELEPHONE ENCOUNTER
Message  Received: 3 days ago       MD Freddy Waddell LPN       Caller: Unspecified (3 days ago, 12:14 PM)                     Yes will send in liquid potassium.            Previous Messages          Left message for patient at home number requesting return call.

## 2018-08-21 ENCOUNTER — OFFICE VISIT (OUTPATIENT)
Dept: FAMILY MEDICINE CLINIC | Age: 83
End: 2018-08-21

## 2018-08-21 VITALS
TEMPERATURE: 98.1 F | HEART RATE: 47 BPM | RESPIRATION RATE: 10 BRPM | SYSTOLIC BLOOD PRESSURE: 146 MMHG | OXYGEN SATURATION: 98 % | DIASTOLIC BLOOD PRESSURE: 80 MMHG

## 2018-08-21 DIAGNOSIS — R10.32 LEFT LOWER QUADRANT PAIN: Primary | ICD-10-CM

## 2018-08-21 RX ORDER — DOCUSATE SODIUM 100 MG/1
CAPSULE, LIQUID FILLED ORAL
Refills: 0 | COMMUNITY
Start: 2018-08-20

## 2018-08-21 RX ORDER — DICYCLOMINE HYDROCHLORIDE 20 MG/1
TABLET ORAL
Refills: 0 | COMMUNITY
Start: 2018-08-20 | End: 2018-12-07

## 2018-08-21 RX ORDER — TRAMADOL HYDROCHLORIDE 50 MG/1
50 TABLET ORAL
Qty: 40 TAB | Refills: 0 | Status: SHIPPED | OUTPATIENT
Start: 2018-08-21 | End: 2018-09-12 | Stop reason: SDUPTHER

## 2018-08-21 NOTE — PROGRESS NOTES
Luyc Sutherland is a 80 y.o. female  presents for follow up form ER. She feels a little better. HPI:   Patient has abdo pain , primarily affecting the abdomen. Pain control:           Current : borderline controlled   6/10           Worst pain over last week        8/10           Least pain over the last week   5/10    Activities of Daily Living:            reasonably well controlled            Are you able to do your normal daily activities?   intermittent    Patient Goals            Functional:  yes            Pain: none  Is patiently on narcotic medicine? If yes, which medication? No    Side effects: Since starting your pain medicine are you experiencing any of the following? (Examples: Constipation, fatigue, lapses in memory, depression or sexual dysfunction)     No    Known Aberrant behaviors:  No (Early refill requests, lost prescriptions, lost medicine)    Pill count: Consistent with patient's prescriptions? NA    Last urine drug screen:      [place] Prescription Monitoring Program check performed: No    Any inconsistencies in patient reported and pharmacy report prescriptions? No  -------------------------------------------------------------------------------------------------------------------  If patient is not currently on narcotic medication and you are planning on initiating narcotics, [address] [place] [address] requires that you address each of the following:    History of narcotic use in the past? No    History of patient reported substance abuse? No    Social History     Social History    Marital status:      Spouse name: N/A    Number of children: N/A    Years of education: N/A     Occupational History    Not on file.      Social History Main Topics    Smoking status: Former Smoker     Quit date: 1/1/1981    Smokeless tobacco: Never Used    Alcohol use No    Drug use: No    Sexual activity: Not Currently     Other Topics Concern    Not on file     Social History Narrative ** Merged History Encounter **            Is the Narcotic Contract Signed and Scanned into the chart? No      Treatment Care Team  Patient Care Team:  Kayla Quiles MD as PCP - General (Family Practice)  Kizzy Ruvalcaba MD (Hematology and Oncology)  Pierce Ramirez DO (Rheumatology)  Afia Wren MD (Surgical Oncology)  Henry Cobian MD (Family Practice)  Estela Simms MD (Orthopedic Surgery)  Jae De Jesus MD (Cardiology)  Cat Hernandez MD (General Surgery)    Follow up contact scheduled for 1-4 weeks: IIf not, list reason: Yes    Allergies   Allergen Reactions    Avapro [Irbesartan] Other (comments)     Hypotension and dizziness    Contrast Agent [Iodine] Other (comments)     Cant remember    Crestor [Rosuvastatin] Itching and Myalgia    Pcn [Penicillins] Rash     Other reaction(s): hives    Plaquenil [Hydroxychloroquine] Rash    Quinine Other (comments)     Other reaction(s): other/intolerance  \"ringing in my ears\"  Ringing in ear     Outpatient Prescriptions Marked as Taking for the 8/21/18 encounter (Office Visit) with Kayla Quiles MD   Medication Sig Dispense Refill    COL-RITE 100 mg capsule take 1 capsule by mouth twice a day for 14 days  0    dicyclomine (BENTYL) 20 mg tablet take 1 tablet by mouth four times a day if needed (ABDOMINAL PAIN)  0    metoprolol tartrate (LOPRESSOR) 50 mg tablet take 1 tablet by mouth twice a day 180 Tab 0    potassium chloride (KAON 20%) 40 mEq/15 mL liqd Take 5 mL by mouth daily. 480 mL 1    citalopram (CELEXA) 10 mg tablet take 1 tablet by mouth once daily 90 Tab 1    furosemide (LASIX) 40 mg tablet take 1 tablet by mouth once daily 30 Tab 5    pravastatin (PRAVACHOL) 10 mg tablet Take 1 Tab by mouth daily. 90 Tab 3    lidocaine (XYLOCAINE) 5 % ointment Apply  2-3 inches to affected area 3-4 times a day 120 g 0    acetaminophen (TYLENOL) 325 mg tablet Take 325 mg by mouth as needed for Pain.       aspirin delayed-release 81 mg tablet Take 81 mg by mouth daily.  polyethylene glycol (MIRALAX) 17 gram packet Take 17 g by mouth daily.  ranitidine (ZANTAC) 150 mg tablet Take 1 Tab by mouth daily. 30 Tab 0     Patient Active Problem List   Diagnosis Code    Lupus erythematosus L93.0    [facility]'s [facility] M35.00    Chronic airway obstruction ([facility]) J44.9    Hypertensive heart disease I11.9    Coronary atherosclerosis of native coronary artery I25.10    Lymphoma, non-Hodgkin's ([facility]) C85.90    Hypercholesterolemia E78.00    Essential hypertension, benign I10    Left displaced femoral neck fracture ([facility]) S72.002A    Diffuse large B-cell lymphoma of lymph nodes of multiple regions ([facility]) C83.38    Pulmonary fibrosis ([facility]) J84.10    Status post angioplasty with stent Z95.9    Mixed hyperlipidemia E78.2    Lupus (systemic lupus erythematosus) (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)     Decubitus ulcer of sacral region, stage 3 ([facility]) 8/18/2016    Dyspnea on exertion     Echocardiogram 12/02/2010    EF 60-65%. Gr 1 DDfx. Mild LVH. Mild MR.    Essential hypertension, benign     GERD (gastroesophageal reflux disease)     Headache(784.0)     History of myocardial perfusion scan 09/14/2012    No evidence of ischemia or infarction. Very mild apical thinning. EF 77%. No WMA. TID 1.46, suggests 3-vessel CAD. Intermediate to high risk pharm stress test due to TID.  Hypercholesterolemia     Lower extremity venous duplex 05/08/2013    Left leg:  No venous thrombosis or venous insufficiency.     Lupus erythematosus 1992    Lymphoma, non-Hodgkin's ([facility] 5/2011    Low-grade being followed by Dr. Anita Moyer without therapy currently    Mitral valve prolapse     10/2017    Pneumothorax 1981    Blebs in right lung with recurrent Pneumothorax    S/P cardiac cath 09/24/2012    Hvy calcification of coronary arteries. LM minimal.  mLAD 35%. oD2 70% (unchanged). LCX mild. pRCA mild. Minimal ISR of prev stent. LVEDP 14-16. EF 65%. No WMA. Likely a falsely abnormal stress test.    Short-term memory loss     From fall in 2003    Sjogren's syndrome ([facility] 1992    report of ROGELIO, SSA, RF positive    Traumatic subdural hematoma ([facility] 2003    Head injury with subdural - s/p fall     Social History     Social History    Marital status:      Spouse name: N/A    Number of children: N/A    Years of education: N/A     Social History Main Topics    Smoking status: Former Smoker     Quit date: 1/1/1981    Smokeless tobacco: Never Used    Alcohol use No    Drug use: No    Sexual activity: Not Currently     Other Topics Concern    None     Social History Narrative    ** Merged History Encounter **          Family History   Problem Relation Age of Onset    Cancer Father     Cancer Brother         Review of Systems   Constitutional: Negative for chills, fever, malaise/fatigue and weight loss. Eyes: Negative for blurred vision. Respiratory: Negative for shortness of breath and wheezing. Cardiovascular: Negative for chest pain. Gastrointestinal: Positive for abdominal pain. Negative for blood in stool, constipation, diarrhea, nausea and vomiting. Genitourinary: Negative. Musculoskeletal: Negative for myalgias. Skin: Negative for rash. Neurological: Negative for weakness. Psychiatric/Behavioral: Negative.         Vitals:    08/21/18 1400   BP: 146/80   Pulse: (!) 47   Resp: 10   Temp: 98.1 °F (36.7 °C)   SpO2: 98%   PainSc:   8   PainLoc: Abdomen       Physical Exam   Constitutional: She is oriented to person, place, and time and well-developed, well-nourished, and in no distress. HENT:   Nose: Nose normal.   Mouth/Throat: Oropharynx is clear and moist.   Neck: Normal range of motion. Neck supple. Cardiovascular: Normal rate, regular rhythm and normal heart sounds. Pulmonary/Chest: Effort normal and breath sounds normal.   Abdominal: Soft. Bowel sounds are normal. She exhibits no distension and no mass. There is no tenderness. There is no rebound and no guarding. Musculoskeletal: Normal range of motion. Neurological: She is alert and oriented to person, place, and time. Skin: Skin is warm and dry. Psychiatric: Mood, memory, affect and judgment normal.   Nursing note and vitals reviewed. Assessment/Plan      ICD-10-CM ICD-9-CM    1. Left lower quadrant pain R10.32 789.04 traMADol (ULTRAM) 50 mg tablet     Will continue meds from ER. If sxs increase return to ER    I have discussed the diagnosis with the patient and the intended plan of care as seen in the above orders. The patient has received an after-visit summary and questions were answered concerning future plans. I have discussed medication, side effects, and warnings with the patient in detail. The patient verbalized understanding and is in agreement with the plan of care. The patient will contact the office with any additional concerns.       Follow-up Disposition:  Return in about 2 weeks (around 9/4/2018), or if symptoms worsen or fail to improve.  lab results and schedule of future lab studies reviewed with patient    Fabi Mac MD

## 2018-08-21 NOTE — PATIENT INSTRUCTIONS

## 2018-08-21 NOTE — MR AVS SNAPSHOT
Angie [facility] [address] [place] [address] Road 
[phone] Patient: Hillary Perry MRN: [id number] HWO:[birth year] Visit Information Date & Time Provider Department Dept. Phone Encounter #  
 8/21/2018  1:45 PM Tai Batista MD [facility] [phone] [id number] Follow-up Instructions Return if symptoms worsen or fail to improve. Your Appointments 12/11/2018  3:00 PM  
Follow Up with Yandy Cox DO [facility] ([facility]) Appt Note: 6 month follow up [place] [address] [address] [address] [phone] Serrano Raissa  
  
    
 12/12/2018  5:15 PM  
FOLLOW UP EXAM with Tai Batista MD  
[facility]) Appt Note: 6mo f/u hyperlipidemia; =  
 [address] [facility] [address] [place] [address] Road  
[phone]  
  
   
 [address] Upcoming Health Maintenance Date Due  
 GLAUCOMA SCREENING Q2Y 5/8/1996 Bone Densitometry (Dexa) Screening 5/8/1996 Influenza Age 5 to Adult 8/1/2018 MEDICARE YEARLY EXAM 8/20/2018 DTaP/Tdap/Td series (2 - Td) 6/26/2027 Allergies as of 8/21/2018  Review Complete On: 8/21/2018 By: Catherine Jose Severity Noted Reaction Type Reactions Avapro [Irbesartan]  01/21/2011   Side Effect Other (comments) Hypotension and dizziness Contrast Agent [Iodine]  02/09/2018    Other (comments) Cant remember Crestor [Rosuvastatin]  01/21/2011   Side Effect Itching, Myalgia Pcn [Penicillins]  01/21/2011   Side Effect Rash Other reaction(s): hives Plaquenil [Hydroxychloroquine]  11/15/2016    Rash Quinine  01/21/2011   Side Effect Other (comments) Other reaction(s): other/intolerance \"ringing in my ears\" Ringing in ear Current Immunizations  Reviewed on 7/11/2016 Name Date Influenza High Dose Vaccine PF 11/1/2016 Influenza Vaccine 10/1/2010 Novel Influenza-H1N1-09, All Formulations 10/1/2010 Pneumococcal Polysaccharide (PPSV-23) 5/1/2010 Not reviewed this visit You Were Diagnosed With   
  
 Codes Comments Left lower quadrant pain    -  Primary ICD-10-CM: R10.32 
ICD-9-CM: 789.04 Vitals BP Pulse Temp Resp SpO2 OB Status 146/80 (!) 47 98.1 °F (36.7 °C) 10 98% Hysterectomy Smoking Status Former Smoker Vitals History Preferred Pharmacy Pharmacy Name Phone [facility] [address] [phone], [phone] [facility] [phone] Your Updated Medication List  
  
   
This list is accurate as of 8/21/18  2:21 PM.  Always use your most recent med list.  
  
  
  
  
 acetaminophen 325 mg tablet Commonly known as:  TYLENOL Take 325 mg by mouth as needed for Pain. aspirin delayed-release 81 mg tablet Take 81 mg by mouth daily. citalopram 10 mg tablet Commonly known as:  CELEXA  
take 1 tablet by mouth once daily COL-RITE 100 mg capsule Generic drug:  docusate sodium  
take 1 capsule by mouth twice a day for 14 days  
  
 dicyclomine 20 mg tablet Commonly known as:  BENTYL  
take 1 tablet by mouth four times a day if needed (ABDOMINAL PAIN) furosemide 40 mg tablet Commonly known as:  LASIX  
take 1 tablet by mouth once daily  
  
 lidocaine 5 % ointment Commonly known as:  XYLOCAINE Apply  2-3 inches to affected area 3-4 times a day  
  
 metoprolol tartrate 50 mg tablet Commonly known as:  LOPRESSOR  
take 1 tablet by mouth twice a day MIRALAX 17 gram packet Generic drug:  polyethylene glycol Take 17 g by mouth daily. potassium chloride 40 mEq/15 mL Liqd Commonly known as:  KAON 20% Take 5 mL by mouth daily. pravastatin 10 mg tablet Commonly known as:  PRAVACHOL Take 1 Tab by mouth daily. raNITIdine 150 mg tablet Commonly known as:  ZANTAC Take 1 Tab by mouth daily. traMADol 50 mg tablet Commonly known as:  ULTRAM  
Take 1 Tab by mouth every six (6) hours as needed for Pain. Max Daily Amount: 200 mg. Prescriptions Printed Refills  
 traMADol (ULTRAM) 50 mg tablet 0 Sig: Take 1 Tab by mouth every six (6) hours as needed for Pain. Max Daily Amount: 200 mg. Class: Print Route: Oral  
  
Follow-up Instructions Return if symptoms worsen or fail to improve. Patient Instructions Abdominal Pain: Care Instructions Your Care Instructions Abdominal pain has many possible causes. Some aren't serious and get better on their own in a few days. Others need more testing and treatment. If your pain continues or gets worse, you need to be rechecked and may need more tests to find out what is wrong. You may need surgery to correct the problem. Don't ignore new symptoms, such as fever, nausea and vomiting, urination problems, pain that gets worse, and dizziness. These may be signs of a more serious problem. Your doctor may have recommended a follow-up visit in the next 8 to 12 hours. If you are not getting better, you may need more tests or treatment. The doctor has checked you carefully, but problems can develop later. If you notice any problems or new symptoms, get medical treatment right away. Follow-up care is a key part of your treatment and safety. Be sure to make and go to all appointments, and call your doctor if you are having problems. It's also a good idea to know your test results and keep a list of the medicines you take. How can you care for yourself at home? · Rest until you feel better. · To prevent dehydration, drink plenty of fluids, enough so that your urine is light yellow or clear like water.  Choose water and other caffeine-free clear liquids until you feel better. If you have kidney, heart, or liver disease and have to limit fluids, talk with your doctor before you increase the amount of fluids you drink. · If your stomach is upset, eat mild foods, such as rice, dry toast or crackers, bananas, and applesauce. Try eating several small meals instead of two or three large ones. · Wait until 48 hours after all symptoms have gone away before you have spicy foods, alcohol, and drinks that contain caffeine. · Do not eat foods that are high in fat. · Avoid anti-inflammatory medicines such as aspirin, ibuprofen (Advil, Motrin), and naproxen (Aleve). These can cause stomach upset. Talk to your doctor if you take daily aspirin for another health problem. When should you call for help? Call 911 anytime you think you may need emergency care. For example, call if: 
  · You passed out (lost consciousness).  
  · You pass maroon or very bloody stools.  
  · You vomit blood or what looks like coffee grounds.  
  · You have new, severe belly pain.  
 Call your doctor now or seek immediate medical care if: 
  · Your pain gets worse, especially if it becomes focused in one area of your belly.  
  · You have a new or higher fever.  
  · Your stools are black and look like tar, or they have streaks of blood.  
  · You have unexpected vaginal bleeding.  
  · You have symptoms of a urinary tract infection. These may include: 
¨ Pain when you urinate. ¨ Urinating more often than usual. 
¨ Blood in your urine.  
  · You are dizzy or lightheaded, or you feel like you may faint.  
 Watch closely for changes in your health, and be sure to contact your doctor if: 
  · You are not getting better after 1 day (24 hours). Where can you learn more? Go to http://melvina-bridger.info/. Enter N622 in the search box to learn more about \"Abdominal Pain: Care Instructions. \" Current as of: November 20, 2017 Content Version: 11.7 © 8622-9595 Healthwise, Incorporated. Care instructions adapted under license by [employer] (which disclaims liability or warranty for this information). If you have questions about a medical condition or this instruction, always ask your healthcare professional. [address] any warranty or liability for your use of this information. Please provide this summary of care documentation to your next provider. Your primary care clinician is listed as [id number] [facility]. If you have any questions after today's visit, please call [phone].

## 2018-08-21 NOTE — PROGRESS NOTES
Patient here for f/u on her ER visit for abdominal pain. She states her pain has not resolved but has gotten a little better, she c/o pain radiating from left abdomen to her back. She says bending over and walking makes her pain worse, sitting still makes her pain better. 1. Have you been to the ER, urgent care clinic since your last visit? Hospitalized since your last visit? Yes When: 8/20/18 [facility] Reason for visit: abdominal pain  2. Have you seen or consulted any other health care providers outside of the [address] [place] [address] since your last visit? Include any pap smears or colon screening. No    Medication reconciliation has been completed with patient. Care team discussed/updated as well as pharmacy. Care everywhere has been ran. Health Maintenance reviewed - Patient asked to schedule MWV to address HM.

## 2018-08-23 ENCOUNTER — TELEPHONE (OUTPATIENT)
Dept: FAMILY MEDICINE CLINIC | Age: 83
End: 2018-08-23

## 2018-08-23 NOTE — TELEPHONE ENCOUNTER
Patient called the office had her verify her  she states she does not want to take the Tramadol that was prescribed for her at her last appoiment for abdominal pain she says she has been having this same pain for 2 years and no one can tell her what is the cause. She has been seen by GI and says they still don't know what is causing her to have this pain. She has been made aware a message will be sent to Dr. Christel Muñiz for his recommendation, please advise.

## 2018-08-30 NOTE — TELEPHONE ENCOUNTER
Message  Received: Today       MD Rani Mary LPN       Caller: Unspecified (1 week ago,  2:26 PM)                     We will discuss it with her on follow up appt.              Previous Messages       ----- Message -----      From: Rani Linda LPN      Sent: 8/60/8988   8:00 AM        To: Fabi Mac MD     ----- Message from Rani Linda LPN sent at 8/33/2172  8:00 AM EDT -----   Please read note and advise.                  Spoke with patient's daughter who has been given above message. Pt expressed clear understanding and had no further questions.

## 2018-08-30 NOTE — TELEPHONE ENCOUNTER
Message  Received: Today       MD Prabhakar Cantu, NITO       Caller: Unspecified (1 week ago,  2:26 PM)                     We will discuss it with her on follow up appt.              Previous Messages          Left message for pt at home number requesting return call.

## 2018-09-04 ENCOUNTER — OFFICE VISIT (OUTPATIENT)
Dept: FAMILY MEDICINE CLINIC | Age: 83
End: 2018-09-04

## 2018-09-04 VITALS
HEART RATE: 75 BPM | RESPIRATION RATE: 14 BRPM | OXYGEN SATURATION: 98 % | TEMPERATURE: 98.1 F | SYSTOLIC BLOOD PRESSURE: 150 MMHG | DIASTOLIC BLOOD PRESSURE: 68 MMHG | HEIGHT: 66 IN

## 2018-09-04 DIAGNOSIS — I10 ESSENTIAL HYPERTENSION, BENIGN: ICD-10-CM

## 2018-09-04 DIAGNOSIS — R10.13 EPIGASTRIC PAIN: ICD-10-CM

## 2018-09-04 DIAGNOSIS — R05.9 COUGH: Primary | ICD-10-CM

## 2018-09-04 DIAGNOSIS — Z23 ENCOUNTER FOR IMMUNIZATION: ICD-10-CM

## 2018-09-04 NOTE — MR AVS SNAPSHOT
[facility] [address] [facility] [address] 
[phone] Patient: Moisés Bennett MRN: [id number]:[id number] Visit Information Date & Time Provider Department Dept. Phone Encounter #  
 9/4/2018  4:00 PM Mary Leal MD [facility] [phone] [id number] Follow-up Instructions Return if symptoms worsen or fail to improve. Your Appointments 12/11/2018  3:00 PM  
Follow Up with Sophia Cope DO [facility] Appt Note: 6 month follow up [place] [address] [phone] Zach Haji  
  
    
 12/12/2018  5:15 PM  
FOLLOW UP EXAM with Mary Leal MD  
[facility]) Appt Note: 6mo f/u hyperlipidemia; =  
 [address] [facility] [address] [place] [address] Road  
[phone]  
  
   
 [address] Upcoming Health Maintenance Date Due  
 GLAUCOMA SCREENING Q2Y 5/8/1996 Bone Densitometry (Dexa) Screening 5/8/1996 Influenza Age 5 to Adult 8/1/2018 MEDICARE YEARLY EXAM 8/20/2018 DTaP/Tdap/Td series (2 - Td) 6/26/2027 Allergies as of 9/4/2018  Review Complete On: 9/4/2018 By: Mary Leal MD  
  
 Severity Noted Reaction Type Reactions Avapro [Irbesartan]  01/21/2011   Side Effect Other (comments) Hypotension and dizziness Contrast Agent [Iodine]  02/09/2018    Other (comments) Cant remember Crestor [Rosuvastatin]  01/21/2011   Side Effect Itching, Myalgia Pcn [Penicillins]  01/21/2011   Side Effect Rash Other reaction(s): hives Plaquenil [Hydroxychloroquine]  11/15/2016    Rash Quinine  01/21/2011   Side Effect Other (comments) Other reaction(s): other/intolerance \"ringing in my ears\" Ringing in ear Current Immunizations  Reviewed on 7/11/2016 Name Date Influenza High Dose Vaccine PF 11/1/2016 Influenza Vaccine 10/1/2010 Influenza Vaccine (Tri) Adjuvanted  Incomplete Novel Influenza-H1N1-09, All Formulations 10/1/2010 Pneumococcal Polysaccharide (PPSV-23) 5/1/2010 Not reviewed this visit You Were Diagnosed With   
  
 Codes Comments Cough    -  Primary ICD-10-CM: Y15 ICD-9-CM: 786.2 Essential hypertension, benign     ICD-10-CM: I10 
ICD-9-CM: 401.1 Epigastric pain     ICD-10-CM: R10.13 ICD-9-CM: 789.06 Encounter for immunization     ICD-10-CM: O82 ICD-9-CM: V03.89 Vitals BP Pulse Temp Resp Height(growth percentile) SpO2  
 150/68 75 98.1 °F (36.7 °C) 14 5' 5.5\" (1.664 m) 98% OB Status Smoking Status Hysterectomy Former Smoker Vitals History Preferred Pharmacy Pharmacy Name Phone [facility] [address] [phone], [phone] [facility] [phone] Your Updated Medication List  
  
   
This list is accurate as of 9/4/18  4:18 PM.  Always use your most recent med list.  
  
  
  
  
 acetaminophen 325 mg tablet Commonly known as:  TYLENOL Take 325 mg by mouth as needed for Pain. aspirin delayed-release 81 mg tablet Take 81 mg by mouth daily. citalopram 10 mg tablet Commonly known as:  CELEXA  
take 1 tablet by mouth once daily COL-RITE 100 mg capsule Generic drug:  docusate sodium  
take 1 capsule by mouth twice a day for 14 days  
  
 dicyclomine 20 mg tablet Commonly known as:  BENTYL  
take 1 tablet by mouth four times a day if needed (ABDOMINAL PAIN) furosemide 40 mg tablet Commonly known as:  LASIX  
take 1 tablet by mouth once daily  
  
 lidocaine 5 % ointment Commonly known as:  XYLOCAINE Apply  2-3 inches to affected area 3-4 times a day  
  
 metoprolol tartrate 50 mg tablet Commonly known as:  LOPRESSOR  
 take 1 tablet by mouth twice a day MIRALAX 17 gram packet Generic drug:  polyethylene glycol Take 17 g by mouth daily. potassium chloride 40 mEq/15 mL Liqd Commonly known as:  KAON 20% Take 5 mL by mouth daily. pravastatin 10 mg tablet Commonly known as:  PRAVACHOL Take 1 Tab by mouth daily. raNITIdine 150 mg tablet Commonly known as:  ZANTAC Take 1 Tab by mouth daily. traMADol 50 mg tablet Commonly known as:  ULTRAM  
Take 1 Tab by mouth every six (6) hours as needed for Pain. Max Daily Amount: 200 mg. We Performed the Following INFLUENZA VACCINE INACTIVATED (IIV), SUBUNIT, ADJUVANTED, IM [id number] CPT(R)] Follow-up Instructions Return if symptoms worsen or fail to improve. To-Do List   
 09/04/2018 Lab:  AMYLASE   
  
 09/04/2018 Lab:  CBC WITH AUTOMATED DIFF   
  
 09/04/2018 Lab:  LIPID PANEL   
  
 09/04/2018 Lab:  METABOLIC PANEL, COMPREHENSIVE   
  
 09/04/2018 Imaging:  XR CHEST PA LAT Patient Instructions Cough: Care Instructions Your Care Instructions A cough is your body's response to something that bothers your throat or airways. Many things can cause a cough. You might cough because of a cold or the flu, bronchitis, or asthma. Smoking, postnasal drip, allergies, and stomach acid that backs up into your throat also can cause coughs. A cough is a symptom, not a disease. Most coughs stop when the cause, such as a cold, goes away. You can take a few steps at home to cough less and feel better. Follow-up care is a key part of your treatment and safety. Be sure to make and go to all appointments, and call your doctor if you are having problems. It's also a good idea to know your test results and keep a list of the medicines you take. How can you care for yourself at home? · Drink lots of water and other fluids.  This helps thin the mucus and soothes a dry or sore throat. Honey or lemon juice in hot water or tea may ease a dry cough. · Take cough medicine as directed by your doctor. · Prop up your head on pillows to help you breathe and ease a dry cough. · Try cough drops to soothe a dry or sore throat. Cough drops don't stop a cough. Medicine-flavored cough drops are no better than candy-flavored drops or hard candy. · Do not smoke. Avoid secondhand smoke. If you need help quitting, talk to your doctor about stop-smoking programs and medicines. These can increase your chances of quitting for good. When should you call for help? Call 911 anytime you think you may need emergency care. For example, call if: 
  · You have severe trouble breathing.  
 Call your doctor now or seek immediate medical care if: 
  · You cough up blood.  
  · You have new or worse trouble breathing.  
  · You have a new or higher fever.  
  · You have a new rash.  
 Watch closely for changes in your health, and be sure to contact your doctor if: 
  · You cough more deeply or more often, especially if you notice more mucus or a change in the color of your mucus.  
  · You have new symptoms, such as a sore throat, an earache, or sinus pain.  
  · You do not get better as expected. Where can you learn more? Go to http://melvina-bridger.info/. Enter D279 in the search box to learn more about \"Cough: Care Instructions. \" Current as of: December 6, 2017 Content Version: 11.7 © 6944-8602 [employer], Incorporated. Care instructions adapted under license by [employer] (which disclaims liability or warranty for this information). If you have questions about a medical condition or this instruction, always ask your healthcare professional. [address] any warranty or liability for your use of this information. Please provide this summary of care documentation to your next provider. Your primary care clinician is listed as [id number] [facility]. If you have any questions after today's visit, please call [phone].

## 2018-09-04 NOTE — PROGRESS NOTES
Laurie Vilchis is a 80 y.o. female  With abdo pain. Pain is getting better. She states that it radiates to left side and back. She has seen GI and has had endoscopy. No NV. She does have cough productive at times. No fever or chills. Allergies   Allergen Reactions    Avapro [Irbesartan] Other (comments)     Hypotension and dizziness    Contrast Agent [Iodine] Other (comments)     Cant remember    Crestor [Rosuvastatin] Itching and Myalgia    Pcn [Penicillins] Rash     Other reaction(s): hives    Plaquenil [Hydroxychloroquine] Rash    Quinine Other (comments)     Other reaction(s): other/intolerance  \"ringing in my ears\"  Ringing in ear       Patient Active Problem List   Diagnosis Code    Lupus erythematosus L93.0    Sjogren's syndrome ([facility] [address] M35.00    Chronic airway obstruction (HCC) J44.9    Hypertensive heart disease I11.9    Coronary atherosclerosis of native coronary artery I25.10    Lymphoma, non-Hodgkin's ([facility]) C85.90    Hypercholesterolemia E78.00    Essential hypertension, benign I10    Left displaced femoral neck fracture ([facility]) S72.002A    Diffuse large B-cell lymphoma of lymph nodes of multiple regions ([facility]) C83.38    Pulmonary fibrosis ([facility]) J84.10    Status post angioplasty with stent Z95.9    Mixed hyperlipidemia E78.2    Lupus (systemic lupus erythematosus) ([facility]) M32.9    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 Gr 1 DDfx. Mild LVH.   Mild MR.    Essential hypertension, benign     GERD (gastroesophageal reflux disease)     Headache(784.0)     History of myocardial perfusion scan 09/14/2012    No evidence of ischemia or infarction. Very mild apical thinning. EF 77%. No WMA. TID 1.46, suggests 3-vessel CAD. Intermediate to high risk pharm stress test due to TID.  Hypercholesterolemia     Lower extremity venous duplex 05/08/2013    Left leg:  No venous thrombosis or venous insufficiency.  Lupus erythematosus 1992    Lymphoma, non-Hodgkin's ([facility] [address] 5/2011    Low-grade being followed by Dr. Imer Reilly without therapy currently    Mitral valve prolapse     10/2017    Pneumothorax 1981    Blebs in right lung with recurrent Pneumothorax    S/P cardiac cath 09/24/2012    Hvy calcification of coronary arteries. LM minimal.  mLAD 35%. oD2 70% (unchanged). LCX mild. pRCA mild. Minimal ISR of prev stent. LVEDP 14-16. EF 65%. No WMA. Likely a falsely abnormal stress test.    Short-term memory loss     From fall in 2003    Sjogren's syndrome ([facility] [address] 1992    report of ROGELIO, SSA, RF positive    [facility] 2003    Head injury with subdural - s/p fall     Social History     Social History    Marital status:      Spouse name: N/A    Number of children: N/A    Years of education: N/A     Social History Main Topics    Smoking status: Former Smoker     Quit date: 1/1/1981    Smokeless tobacco: Never Used    Alcohol use No    Drug use: No    Sexual activity: Not Currently     Other Topics Concern    None     Social History Narrative    ** Merged History Encounter **          Family History   Problem Relation Age of Onset    Cancer Father     Cancer Brother         Review of Systems   Constitutional: Negative for chills, fever, malaise/fatigue and weight loss. Eyes: Negative for blurred vision. Respiratory: Negative for shortness of breath and wheezing. Cardiovascular: Negative for chest pain.    Gastrointestinal: Positive for abdominal pain and heartburn. Negative for constipation, diarrhea, nausea and vomiting. Genitourinary: Negative. Musculoskeletal: Negative for myalgias. Skin: Negative for rash. Neurological: Negative for weakness. Vitals:    09/04/18 1604   BP: 150/68   Pulse: 75   Resp: 14   Temp: 98.1 °F (36.7 °C)   SpO2: 98%   Height: 5' 5.5\" (1.664 m)   PainSc:   8   PainLoc: Generalized       Physical Exam   Constitutional: She is oriented to person, place, and time and well-developed, well-nourished, and in no distress. HENT:   Nose: Nose normal.   Mouth/Throat: Oropharynx is clear and moist.   Neck: Normal range of motion. Neck supple. Cardiovascular: Normal rate, regular rhythm and normal heart sounds. Pulmonary/Chest: Effort normal and breath sounds normal.   Abdominal: Soft. Bowel sounds are normal. She exhibits no distension. There is no tenderness. There is no rebound and no guarding. Musculoskeletal: Normal range of motion. Neurological: She is alert and oriented to person, place, and time. Gait normal.   Skin: Skin is warm and dry. Psychiatric: Mood, memory, affect and judgment normal.   Nursing note and vitals reviewed. Assessment/Plan      ICD-10-CM ICD-9-CM    1. Cough R05 786.2 XR CHEST PA LAT   2. Essential hypertension, benign I10 401.1    3. Epigastric pain H25.60 842.49 METABOLIC PANEL, COMPREHENSIVE      CBC WITH AUTOMATED DIFF      AMYLASE      LIPID PANEL   4. Encounter for immunization Z23 V03.89 INFLUENZA VACCINE INACTIVATED (IIV), SUBUNIT, ADJUVANTED, IM     She will also follow up with pulmonologist.  I have discussed the diagnosis with the patient and the intended plan of care as seen in the above orders. The patient has received an after-visit summary and questions were answered concerning future plans. I have discussed medication, side effects, and warnings with the patient in detail. The patient verbalized understanding and is in agreement with the plan of care.  The patient will contact the office with any additional concerns.       Follow-up Disposition:  Return if symptoms worsen or fail to improve.  lab results and schedule of future lab studies reviewed with patient    Anabell Ibrahim MD

## 2018-09-04 NOTE — PATIENT INSTRUCTIONS
Cough: Care Instructions  Your Care Instructions    A cough is your body's response to something that bothers your throat or airways. Many things can cause a cough. You might cough because of a cold or the flu, bronchitis, or asthma. Smoking, postnasal drip, allergies, and stomach acid that backs up into your throat also can cause coughs. A cough is a symptom, not a disease. Most coughs stop when the cause, such as a cold, goes away. You can take a few steps at home to cough less and feel better. Follow-up care is a key part of your treatment and safety. Be sure to make and go to all appointments, and call your doctor if you are having problems. It's also a good idea to know your test results and keep a list of the medicines you take. How can you care for yourself at home? · Drink lots of water and other fluids. This helps thin the mucus and soothes a dry or sore throat. Honey or lemon juice in hot water or tea may ease a dry cough. · Take cough medicine as directed by your doctor. · Prop up your head on pillows to help you breathe and ease a dry cough. · Try cough drops to soothe a dry or sore throat. Cough drops don't stop a cough. Medicine-flavored cough drops are no better than candy-flavored drops or hard candy. · Do not smoke. Avoid secondhand smoke. If you need help quitting, talk to your doctor about stop-smoking programs and medicines. These can increase your chances of quitting for good. When should you call for help? Call 911 anytime you think you may need emergency care.  For example, call if:    · You have severe trouble breathing.    Call your doctor now or seek immediate medical care if:    · You cough up blood.     · You have new or worse trouble breathing.     · You have a new or higher fever.     · You have a new rash.    Watch closely for changes in your health, and be sure to contact your doctor if:    · You cough more deeply or more often, especially if you notice more mucus or a change in the color of your mucus.     · You have new symptoms, such as a sore throat, an earache, or sinus pain.     · You do not get better as expected. Where can you learn more? Go to http://melvina-bridger.info/. Enter D279 in the search box to learn more about \"Cough: Care Instructions. \"  Current as of: December 6, 2017  Content Version: 11.7  © 6495-9838 [employer]. Care instructions adapted under license by [employer] (which disclaims liability or warranty for this information). If you have questions about a medical condition or this instruction, always ask your healthcare professional. [address] any warranty or liability for your use of this information.

## 2018-09-07 ENCOUNTER — HOSPITAL ENCOUNTER (OUTPATIENT)
Dept: GENERAL RADIOLOGY | Age: 83
Discharge: HOME | End: 2018-09-07
Payer: MEDICARE

## 2018-09-07 ENCOUNTER — HOSPITAL ENCOUNTER (OUTPATIENT)
Dept: LAB | Age: 83
Discharge: HOME | End: 2018-09-07
Payer: MEDICARE

## 2018-09-07 DIAGNOSIS — R05.9 COUGH: ICD-10-CM

## 2018-09-07 DIAGNOSIS — R10.13 EPIGASTRIC PAIN: ICD-10-CM

## 2018-09-07 LAB
ALBUMIN SERPL-MCNC: 3.7 G/DL (ref 3.4–5)
ALBUMIN/GLOB SERPL: 1.1 {RATIO} (ref 0.8–1.7)
ALP SERPL-CCNC: 67 U/L (ref 45–117)
ALT SERPL-CCNC: 12 U/L (ref 13–56)
AMYLASE SERPL-CCNC: 63 U/L (ref 25–115)
ANION GAP SERPL CALC-SCNC: 3 MMOL/L (ref 3–18)
AST SERPL-CCNC: 16 U/L (ref 15–37)
BASOPHILS # BLD: 0 K/UL (ref 0–0.1)
BASOPHILS NFR BLD: 1 % (ref 0–2)
BILIRUB SERPL-MCNC: 0.7 MG/DL (ref 0.2–1)
BUN SERPL-MCNC: 14 MG/DL (ref 7–18)
BUN/CREAT SERPL: 11 (ref 12–20)
CALCIUM SERPL-MCNC: 9.3 MG/DL (ref 8.5–10.1)
CHLORIDE SERPL-SCNC: 104 MMOL/L (ref 100–108)
CHOLEST SERPL-MCNC: 163 MG/DL
CO2 SERPL-SCNC: 34 MMOL/L (ref 21–32)
CREAT SERPL-MCNC: 1.29 MG/DL (ref 0.6–1.3)
DIFFERENTIAL METHOD BLD: ABNORMAL
EOSINOPHIL # BLD: 0.3 K/UL (ref 0–0.4)
EOSINOPHIL NFR BLD: 10 % (ref 0–5)
ERYTHROCYTE [DISTWIDTH] IN BLOOD BY AUTOMATED COUNT: 13.4 % (ref 11.6–14.5)
GLOBULIN SER CALC-MCNC: 3.3 G/DL (ref 2–4)
GLUCOSE SERPL-MCNC: 85 MG/DL (ref 74–99)
HCT VFR BLD AUTO: 35.6 % (ref 35–45)
HDLC SERPL-MCNC: 74 MG/DL (ref 40–60)
HDLC SERPL: 2.2 {RATIO} (ref 0–5)
HGB BLD-MCNC: 11.7 G/DL (ref 12–16)
LDLC SERPL CALC-MCNC: 76.4 MG/DL (ref 0–100)
LIPID PROFILE,FLP: ABNORMAL
LYMPHOCYTES # BLD: 1 K/UL (ref 0.9–3.6)
LYMPHOCYTES NFR BLD: 31 % (ref 21–52)
MCH RBC QN AUTO: 28.4 PG (ref 24–34)
MCHC RBC AUTO-ENTMCNC: 32.9 G/DL (ref 31–37)
MCV RBC AUTO: 86.4 FL (ref 74–97)
MONOCYTES # BLD: 0.5 K/UL (ref 0.05–1.2)
MONOCYTES NFR BLD: 14 % (ref 3–10)
NEUTS SEG # BLD: 1.4 K/UL (ref 1.8–8)
NEUTS SEG NFR BLD: 44 % (ref 40–73)
PLATELET # BLD AUTO: 232 K/UL (ref 135–420)
PMV BLD AUTO: 8.9 FL (ref 9.2–11.8)
POTASSIUM SERPL-SCNC: 3.9 MMOL/L (ref 3.5–5.5)
PROT SERPL-MCNC: 7 G/DL (ref 6.4–8.2)
RBC # BLD AUTO: 4.12 M/UL (ref 4.2–5.3)
SODIUM SERPL-SCNC: 141 MMOL/L (ref 136–145)
TRIGL SERPL-MCNC: 63 MG/DL (ref ?–150)
VLDLC SERPL CALC-MCNC: 12.6 MG/DL
WBC # BLD AUTO: 3.2 K/UL (ref 4.6–13.2)

## 2018-09-07 PROCEDURE — 85025 COMPLETE CBC W/AUTO DIFF WBC: CPT | Performed by: FAMILY MEDICINE

## 2018-09-07 PROCEDURE — 80053 COMPREHEN METABOLIC PANEL: CPT | Performed by: FAMILY MEDICINE

## 2018-09-07 PROCEDURE — 36415 COLL VENOUS BLD VENIPUNCTURE: CPT | Performed by: FAMILY MEDICINE

## 2018-09-07 PROCEDURE — 71046 X-RAY EXAM CHEST 2 VIEWS: CPT

## 2018-09-07 PROCEDURE — 80061 LIPID PANEL: CPT | Performed by: FAMILY MEDICINE

## 2018-09-07 PROCEDURE — 82150 ASSAY OF AMYLASE: CPT | Performed by: FAMILY MEDICINE

## 2018-09-12 ENCOUNTER — OFFICE VISIT (OUTPATIENT)
Dept: FAMILY MEDICINE CLINIC | Age: 83
End: 2018-09-12

## 2018-09-12 VITALS
SYSTOLIC BLOOD PRESSURE: 170 MMHG | DIASTOLIC BLOOD PRESSURE: 94 MMHG | OXYGEN SATURATION: 96 % | BODY MASS INDEX: 19.93 KG/M2 | RESPIRATION RATE: 8 BRPM | HEIGHT: 66 IN | TEMPERATURE: 97.6 F | HEART RATE: 70 BPM | WEIGHT: 124 LBS

## 2018-09-12 DIAGNOSIS — I10 UNCONTROLLED HYPERTENSION: ICD-10-CM

## 2018-09-12 DIAGNOSIS — N20.0 KIDNEY STONE ON LEFT SIDE: ICD-10-CM

## 2018-09-12 DIAGNOSIS — K86.2 PANCREATIC CYST: ICD-10-CM

## 2018-09-12 DIAGNOSIS — R10.32 LEFT LOWER QUADRANT PAIN: ICD-10-CM

## 2018-09-12 DIAGNOSIS — R10.12 LEFT UPPER QUADRANT PAIN: Primary | ICD-10-CM

## 2018-09-12 RX ORDER — TRAMADOL HYDROCHLORIDE 50 MG/1
50 TABLET ORAL
Qty: 40 TAB | Refills: 0 | Status: SHIPPED | OUTPATIENT
Start: 2018-09-12 | End: 2018-12-07

## 2018-09-12 NOTE — MR AVS SNAPSHOT
[address] [place] [address] Road 
[phone] Patient: Azael Donaldson MRN: [id number] Visit Information Date & Time Provider Department Dept. Phone Encounter #  
 2018  3:30 PM Carolyn Nelson MD [facility] [phone] [id number] Your Appointments 2018  3:00 PM  
Follow Up with Royce Arias DO [facility] ([facility]) Appt Note: 6 month follow up [place] [id number] [id number] [phone] Zach Haji  
  
    
 2018  5:15 PM  
FOLLOW UP EXAM with Carolyn Nelson MD  
[address]) Appt Note: 6mo f/u hyperlipidemia; =  
 [id number] [facility] [address] [place] [address]  
[phone]  
  
   
 [facility] [address] Upcoming Health Maintenance Date Due  
 GLAUCOMA SCREENING Q2Y 1996 Bone Densitometry (Dexa) Screening 1996 MEDICARE YEARLY EXAM 2018 DTaP/Tdap/Td series (2 - Td) 2027 Allergies as of 2018  Review Complete On: 2018 By: Carolyn Nelson MD  
  
 Severity Noted Reaction Type Reactions Avapro [Irbesartan]  2011   Side Effect Other (comments) Hypotension and dizziness Contrast Agent [Iodine]  2018    Other (comments) Cant remember Crestor [Rosuvastatin]  2011   Side Effect Itching, Myalgia Pcn [Penicillins]  2011   Side Effect Rash Other reaction(s): hives Plaquenil [Hydroxychloroquine]  11/15/2016    Rash Quinine  2011   Side Effect Other (comments) Other reaction(s): other/intolerance \"ringing in my ears\" Ringing in ear Current Immunizations  Reviewed on 2016 Name Date Influenza High Dose Vaccine PF 11/1/2016 Influenza Vaccine 10/1/2010 Influenza Vaccine (Tri) Adjuvanted 9/4/2018 Novel Influenza-H1N1-09, All Formulations 10/1/2010 Pneumococcal Polysaccharide (PPSV-23) 5/1/2010 Not reviewed this visit You Were Diagnosed With   
  
 Codes Comments Left upper quadrant pain    -  Primary ICD-10-CM: R10.12 ICD-9-CM: 789.02 Pancreatic cyst     ICD-10-CM: K86.2 ICD-9-CM: 663.8 Kidney stone on left side     ICD-10-CM: N20.0 ICD-9-CM: 592.0 Uncontrolled hypertension     ICD-10-CM: I10 
ICD-9-CM: 401.9 Left lower quadrant pain     ICD-10-CM: R10.32 
ICD-9-CM: 789.04 Vitals BP Pulse Temp Resp Height(growth percentile) Weight(growth percentile) (!) 170/94 70 97.6 °F (36.4 °C) (Oral) 8 5' 5.5\" (1.664 m) 124 lb (56.2 kg) SpO2 BMI OB Status Smoking Status 96% 20.32 kg/m2 Hysterectomy Former Smoker BMI and BSA Data Body Mass Index Body Surface Area  
 20.32 kg/m 2 1.61 m 2 Preferred Pharmacy Pharmacy Name Phone [facility] [address], [phone] [facility] [phone] Your Updated Medication List  
  
   
This list is accurate as of 9/12/18  4:04 PM.  Always use your most recent med list.  
  
  
  
  
 acetaminophen 325 mg tablet Commonly known as:  TYLENOL Take 325 mg by mouth as needed for Pain. aspirin delayed-release 81 mg tablet Take 81 mg by mouth daily. citalopram 10 mg tablet Commonly known as:  CELEXA  
take 1 tablet by mouth once daily COL-RITE 100 mg capsule Generic drug:  docusate sodium  
take 1 capsule by mouth twice a day for 14 days  
  
 dicyclomine 20 mg tablet Commonly known as:  BENTYL  
take 1 tablet by mouth four times a day if needed (ABDOMINAL PAIN) furosemide 40 mg tablet Commonly known as:  LASIX  
take 1 tablet by mouth once daily  
  
 lidocaine 5 % ointment Commonly known as:  XYLOCAINE  
 Apply  2-3 inches to affected area 3-4 times a day  
  
 metoprolol tartrate 50 mg tablet Commonly known as:  LOPRESSOR  
take 1 tablet by mouth twice a day MIRALAX 17 gram packet Generic drug:  polyethylene glycol Take 17 g by mouth daily. potassium chloride 40 mEq/15 mL Liqd Commonly known as:  KAON 20% Take 5 mL by mouth daily. pravastatin 10 mg tablet Commonly known as:  PRAVACHOL Take 1 Tab by mouth daily. raNITIdine 150 mg tablet Commonly known as:  ZANTAC Take 1 Tab by mouth daily. * traMADol 50 mg tablet Commonly known as:  ULTRAM  
Take 1 Tab by mouth every six (6) hours as needed for Pain. Max Daily Amount: 200 mg.  
  
 * traMADol 50 mg tablet Commonly known as:  ULTRAM  
Take 1 Tab by mouth every six (6) hours as needed for Pain. Max Daily Amount: 200 mg.  
  
 * Notice: This list has 2 medication(s) that are the same as other medications prescribed for you. Read the directions carefully, and ask your doctor or other care provider to review them with you. Prescriptions Printed Refills  
 traMADol (ULTRAM) 50 mg tablet 0 Sig: Take 1 Tab by mouth every six (6) hours as needed for Pain. Max Daily Amount: 200 mg. Class: Print Route: Oral  
 traMADol (ULTRAM) 50 mg tablet 0 Sig: Take 1 Tab by mouth every six (6) hours as needed for Pain. Max Daily Amount: 200 mg. Class: Print Route: Oral  
  
We Performed the Following REFERRAL TO GENERAL SURGERY [REF27 Custom] Comments:  
 Please evaluate patient for persistent abdo pain. Referral Information Referral ID Referred By Referred To  
  
 [id number] ALLEN [facility]lee Not Available Visits Status Start Date End Date 1 New Request 9/12/18 9/12/19 If your referral has a status of pending review or denied, additional information will be sent to support the outcome of this decision. Please provide this summary of care documentation to your next provider. Your primary care clinician is listed as [id number]. If you have any questions after today's visit, please call [phone].

## 2018-09-12 NOTE — PROGRESS NOTES
Clau Berumen is a 80 y.o. female  presents for follow up. She continues to have abdo pain. It is on left upper area. She has stated that it is getting worse. No nausea or vomiting. She has had CT of abdo and pelvis. Allergies   Allergen Reactions    Avapro [Irbesartan] Other (comments)     Hypotension and dizziness    Contrast Agent [Iodine] Other (comments)     Cant remember    Crestor [Rosuvastatin] Itching and Myalgia    Pcn [Penicillins] Rash     Other reaction(s): hives    Plaquenil [Hydroxychloroquine] Rash    Quinine Other (comments)     Other reaction(s): other/intolerance  \"ringing in my ears\"  Ringing in ear     Outpatient Prescriptions Marked as Taking for the 9/12/18 encounter (Office Visit) with Alejandro Cee MD   Medication Sig Dispense Refill    COL-RITE 100 mg capsule take 1 capsule by mouth twice a day for 14 days  0    dicyclomine (BENTYL) 20 mg tablet take 1 tablet by mouth four times a day if needed (ABDOMINAL PAIN)  0    traMADol (ULTRAM) 50 mg tablet Take 1 Tab by mouth every six (6) hours as needed for Pain. Max Daily Amount: 200 mg. 40 Tab 0    metoprolol tartrate (LOPRESSOR) 50 mg tablet take 1 tablet by mouth twice a day 180 Tab 0    potassium chloride (KAON 20%) 40 mEq/15 mL liqd Take 5 mL by mouth daily. 480 mL 1    citalopram (CELEXA) 10 mg tablet take 1 tablet by mouth once daily 90 Tab 1    furosemide (LASIX) 40 mg tablet take 1 tablet by mouth once daily 30 Tab 5    pravastatin (PRAVACHOL) 10 mg tablet Take 1 Tab by mouth daily. 90 Tab 3    lidocaine (XYLOCAINE) 5 % ointment Apply  2-3 inches to affected area 3-4 times a day 120 g 0    acetaminophen (TYLENOL) 325 mg tablet Take 325 mg by mouth as needed for Pain.  aspirin delayed-release 81 mg tablet Take 81 mg by mouth daily.  polyethylene glycol (MIRALAX) 17 gram packet Take 17 g by mouth daily.  ranitidine (ZANTAC) 150 mg tablet Take 1 Tab by mouth daily.  30 Tab 0     Patient Active Problem List   Diagnosis Code    Lupus erythematosus L93.0    Sjogren's syndrome ([facility] [address] M35.00    Chronic airway obstruction ([facility]) J44.9    Hypertensive heart disease I11.9    Coronary atherosclerosis of native coronary artery I25.10    Lymphoma, non-Hodgkin's ([facility]) C85.90    Hypercholesterolemia E78.00    Essential hypertension, benign I10    Left displaced femoral neck fracture ([facility]) S72.002A    Diffuse large B-cell lymphoma of lymph nodes of multiple regions ([facility]) C83.38    Pulmonary fibrosis ([facility]) J84.10    Status post angioplasty with stent Z95.9    Mixed hyperlipidemia E78.2    Lupus (systemic lupus erythematosus) ([facility]) M32.9    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 Gr 1 DDfx. Mild LVH. Mild MR.    Essential hypertension, benign     GERD (gastroesophageal reflux disease)     Headache(784.0)     History of myocardial perfusion scan 09/14/2012    No evidence of ischemia or infarction. Very mild apical thinning. EF 77%. No WMA. TID 1.46, suggests 3-vessel CAD. Intermediate to high risk pharm stress test due to TID.  Hypercholesterolemia     Lower extremity venous duplex 05/08/2013    Left leg:  No venous thrombosis or venous insufficiency.     Lupus erythematosus 1992    Lymphoma, non-Hodgkin's ([facility] [address] 5/2011    Low-grade being followed by Dr. Mariana Pagan without therapy currently    Mitral valve prolapse     10/2017    Pneumothorax 1981    Blebs in right lung with recurrent Pneumothorax    S/P cardiac cath 09/24/2012    Hvy calcification of coronary arteries. LM minimal.  mLAD 35%. oD2 70% (unchanged). LCX mild. pRCA mild. Minimal ISR of prev stent. LVEDP 14-16. EF 65%. No WMA. Likely a falsely abnormal stress test.    Short-term memory loss     From fall in 2003    Sjogren's syndrome ([facility] [address] 1992    report of ROGELIO, SSA, RF positive    Traumatic subdural hematoma ([facility] [address] 2003    Head injury with subdural - s/p fall     Social History     Social History    Marital status:      Spouse name: N/A    Number of children: N/A    Years of education: N/A     Social History Main Topics    Smoking status: Former Smoker     Quit date: 1/1/1981    Smokeless tobacco: Never Used    Alcohol use No    Drug use: No    Sexual activity: Not Currently     Other Topics Concern    None     Social History Narrative    ** Merged History Encounter **          Family History   Problem Relation Age of Onset    Cancer Father     Cancer Brother         Review of Systems   Constitutional: Negative for chills, fever, malaise/fatigue and weight loss. Eyes: Negative for blurred vision. Respiratory: Positive for cough. Negative for shortness of breath and wheezing. Cardiovascular: Negative for chest pain. Gastrointestinal: Positive for abdominal pain. Negative for constipation, diarrhea, nausea and vomiting. Musculoskeletal: Negative for myalgias. Skin: Negative for rash. Neurological: Negative for weakness. Psychiatric/Behavioral: Negative. Vitals:    09/12/18 1540   BP: (!) 170/94   Pulse: 70   Resp: 8   Temp: 97.6 °F (36.4 °C)   TempSrc: Oral   SpO2: 96%   Weight: 124 lb (56.2 kg)   Height: 5' 5.5\" (1.664 m)   PainSc:   8   PainLoc: Abdomen       Physical Exam   Constitutional: She is oriented to person, place, and time and well-developed, well-nourished, and in no distress. HENT:   Nose: Nose normal.   Mouth/Throat: Oropharynx is clear and moist.   Neck: Normal range of motion.  Neck supple. Cardiovascular: Normal rate, regular rhythm and normal heart sounds. Pulmonary/Chest: Effort normal and breath sounds normal.   Abdominal: Soft. Bowel sounds are normal. She exhibits no distension. There is tenderness. There is guarding. There is no rebound. Musculoskeletal: Normal range of motion. Neurological: She is alert and oriented to person, place, and time. Skin: Skin is warm and dry. Psychiatric: Mood, memory, affect and judgment normal.   Nursing note and vitals reviewed. Assessment/Plan      ICD-10-CM ICD-9-CM    1. Left upper quadrant pain R10.12 789.02 REFERRAL TO GENERAL SURGERY   2. Pancreatic cyst K86.2 577.2    3. Kidney stone on left side N20.0 592.0    4. Uncontrolled hypertension I10 401.9 Will repeat BP next visit and treat pain. Labs and CT reviewed from [facility] with above findings    Will get general surgery appt. Has GI appt for next week. Will have pt go directly to ER if sxs increase    I have discussed the diagnosis with the patient and the intended plan of care as seen in the above orders. The patient has received an after-visit summary and questions were answered concerning future plans. I have discussed medication, side effects, and warnings with the patient in detail. The patient verbalized understanding and is in agreement with the plan of care. The patient will contact the office with any additional concerns.       Follow-up Disposition: Not on File  lab results and schedule of future lab studies reviewed with patient    Adama De MD

## 2018-09-12 NOTE — PROGRESS NOTES
Patient c/o left abdominal pain she continues to have she states her pain was worse this AM. She is here to discuss test results. Pain was a 9-10 this AM, sitting makes her pain better. 1. Have you been to the ER, urgent care clinic since your last visit? Hospitalized since your last visit? No  2. Have you seen or consulted any other health care providers outside of the [address] [place] [address] since your last visit? Include any pap smears or colon screening.  No

## 2018-09-18 ENCOUNTER — HOSPITAL ENCOUNTER (OUTPATIENT)
Dept: LAB | Age: 83
Discharge: HOME | End: 2018-09-18
Payer: MEDICARE

## 2018-09-18 ENCOUNTER — OFFICE VISIT (OUTPATIENT)
Dept: SURGERY | Age: 83
End: 2018-09-18

## 2018-09-18 VITALS
WEIGHT: 122 LBS | BODY MASS INDEX: 20.33 KG/M2 | RESPIRATION RATE: 18 BRPM | DIASTOLIC BLOOD PRESSURE: 82 MMHG | HEART RATE: 70 BPM | SYSTOLIC BLOOD PRESSURE: 122 MMHG | HEIGHT: 65 IN | TEMPERATURE: 98.1 F

## 2018-09-18 DIAGNOSIS — R10.11 RUQ ABDOMINAL PAIN: ICD-10-CM

## 2018-09-18 DIAGNOSIS — K86.9 DISEASE OF PANCREAS: ICD-10-CM

## 2018-09-18 DIAGNOSIS — D37.8 NEOPLASM OF UNCERTAIN BEHAVIOR OF PANCREAS: ICD-10-CM

## 2018-09-18 DIAGNOSIS — D49.0 PANCREATIC NEOPLASM: ICD-10-CM

## 2018-09-18 DIAGNOSIS — G89.3 NEOPLASM RELATED PAIN: ICD-10-CM

## 2018-09-18 DIAGNOSIS — K86.89 PANCREATIC MASS: ICD-10-CM

## 2018-09-18 DIAGNOSIS — Z85.79 HISTORY OF LYMPHOMA: ICD-10-CM

## 2018-09-18 DIAGNOSIS — K86.89 PANCREATIC MASS: Primary | ICD-10-CM

## 2018-09-18 LAB
ALBUMIN SERPL-MCNC: 3.7 G/DL (ref 3.4–5)
ALBUMIN/GLOB SERPL: 1 {RATIO} (ref 0.8–1.7)
ALP SERPL-CCNC: 67 U/L (ref 45–117)
ALT SERPL-CCNC: 11 U/L (ref 13–56)
ANION GAP SERPL CALC-SCNC: 9 MMOL/L (ref 3–18)
APTT PPP: 25.7 SEC (ref 23–36.4)
AST SERPL-CCNC: 12 U/L (ref 15–37)
BASOPHILS # BLD: 0 K/UL (ref 0–0.1)
BASOPHILS NFR BLD: 0 % (ref 0–2)
BILIRUB DIRECT SERPL-MCNC: 0.2 MG/DL (ref 0–0.2)
BILIRUB SERPL-MCNC: 0.8 MG/DL (ref 0.2–1)
BUN SERPL-MCNC: 20 MG/DL (ref 7–18)
BUN/CREAT SERPL: 15 (ref 12–20)
CALCIUM SERPL-MCNC: 10.2 MG/DL (ref 8.5–10.1)
CHLORIDE SERPL-SCNC: 104 MMOL/L (ref 100–108)
CO2 SERPL-SCNC: 30 MMOL/L (ref 21–32)
CREAT SERPL-MCNC: 1.35 MG/DL (ref 0.6–1.3)
DIFFERENTIAL METHOD BLD: ABNORMAL
EOSINOPHIL # BLD: 0.2 K/UL (ref 0–0.4)
EOSINOPHIL NFR BLD: 4 % (ref 0–5)
ERYTHROCYTE [DISTWIDTH] IN BLOOD BY AUTOMATED COUNT: 13.1 % (ref 11.6–14.5)
GLOBULIN SER CALC-MCNC: 3.6 G/DL (ref 2–4)
GLUCOSE SERPL-MCNC: 94 MG/DL (ref 74–99)
HCT VFR BLD AUTO: 34.7 % (ref 35–45)
HGB BLD-MCNC: 11.5 G/DL (ref 12–16)
INR PPP: 1 (ref 0.8–1.2)
LYMPHOCYTES # BLD: 1 K/UL (ref 0.9–3.6)
LYMPHOCYTES NFR BLD: 20 % (ref 21–52)
MCH RBC QN AUTO: 28.6 PG (ref 24–34)
MCHC RBC AUTO-ENTMCNC: 33.1 G/DL (ref 31–37)
MCV RBC AUTO: 86.3 FL (ref 74–97)
MONOCYTES # BLD: 0.7 K/UL (ref 0.05–1.2)
MONOCYTES NFR BLD: 13 % (ref 3–10)
NEUTS SEG # BLD: 3.1 K/UL (ref 1.8–8)
NEUTS SEG NFR BLD: 63 % (ref 40–73)
PLATELET # BLD AUTO: 241 K/UL (ref 135–420)
PMV BLD AUTO: 9 FL (ref 9.2–11.8)
POTASSIUM SERPL-SCNC: 3.5 MMOL/L (ref 3.5–5.5)
PROT SERPL-MCNC: 7.3 G/DL (ref 6.4–8.2)
PROTHROMBIN TIME: 12.6 SEC (ref 11.5–15.2)
RBC # BLD AUTO: 4.02 M/UL (ref 4.2–5.3)
SODIUM SERPL-SCNC: 143 MMOL/L (ref 136–145)
WBC # BLD AUTO: 5 K/UL (ref 4.6–13.2)

## 2018-09-18 PROCEDURE — 85610 PROTHROMBIN TIME: CPT | Performed by: SURGERY

## 2018-09-18 PROCEDURE — 36415 COLL VENOUS BLD VENIPUNCTURE: CPT | Performed by: SURGERY

## 2018-09-18 PROCEDURE — 86301 IMMUNOASSAY TUMOR CA 19-9: CPT | Performed by: SURGERY

## 2018-09-18 PROCEDURE — 80048 BASIC METABOLIC PNL TOTAL CA: CPT | Performed by: SURGERY

## 2018-09-18 PROCEDURE — 82378 CARCINOEMBRYONIC ANTIGEN: CPT | Performed by: SURGERY

## 2018-09-18 PROCEDURE — 85730 THROMBOPLASTIN TIME PARTIAL: CPT | Performed by: SURGERY

## 2018-09-18 PROCEDURE — 80076 HEPATIC FUNCTION PANEL: CPT | Performed by: SURGERY

## 2018-09-18 PROCEDURE — 85025 COMPLETE CBC W/AUTO DIFF WBC: CPT | Performed by: SURGERY

## 2018-09-18 NOTE — PROGRESS NOTES
Review of Systems   Constitutional: Positive for malaise/fatigue and weight loss. Negative for chills, diaphoresis and fever. HENT: Positive for hearing loss. Negative for congestion, ear discharge, ear pain, nosebleeds, sinus pain, sore throat and tinnitus. Eyes: Negative. Respiratory: Positive for shortness of breath. Negative for cough, hemoptysis, sputum production, wheezing and stridor. Cardiovascular: Negative. Gastrointestinal: Positive for abdominal pain and constipation. Negative for blood in stool, diarrhea, heartburn, melena, nausea and vomiting. Genitourinary: Negative. Musculoskeletal: Positive for joint pain. Negative for back pain, falls, myalgias and neck pain. Skin: Negative. Neurological: Positive for weakness. Endo/Heme/Allergies: Negative for environmental allergies and polydipsia. Bruises/bleeds easily. Psychiatric/Behavioral: Negative.

## 2018-09-19 ENCOUNTER — TELEPHONE (OUTPATIENT)
Dept: FAMILY MEDICINE CLINIC | Age: 83
End: 2018-09-19

## 2018-09-19 LAB — CANCER AG19-9 SERPL-ACNC: 10 U/ML (ref 0–35)

## 2018-09-19 NOTE — TELEPHONE ENCOUNTER
Pt daughter called back stating the tramadol is working,but she is hallucinating and she taking her med twice a day instead of 4 times a day.

## 2018-09-19 NOTE — TELEPHONE ENCOUNTER
Ms. Yosef Soto called stating that the tramadol is no longer helping the patient with her pain. She would like to know if there is anything else. Please advise.

## 2018-09-19 NOTE — PROGRESS NOTES
[employer] [facility]  General Surgery    Subjective:      HPI: Patient is a very pleasant 80-year-old female with a past medical history remarkable for diffuse B-cell lymphoma status post chemotherapy and MediPort removal, hypertension, hypertensive heart disease, not rheumatic mitral regurgitation, status post cardiac catheterization with stenting, systemic lupus erythematosus, Sjogren's syndrome, COPD with pulmonary fibrosis, history of traumatic subdural hematoma and shingles. She is referred by Dr. Tash Epperson for evaluation and management of a mass in the head of the pancreas. The patient has a history of constipation. She initially experienced left-sided abdominal pain which led to the CT of the abdomen pelvis where the pancreatic mass was diagnosed. She denies any unintentional weight loss or nausea vomiting. She has had a cough which does lead sometimes to emesis. She has been evaluated in the past by the gastrointestinal liver specialist group Dr. Jemma Gardner and Dr. Sachin Hall with EGD. She has a history of a lung resection in 1981 for what sounds like blebs. She denies any history of tobacco abuse recently she stopped smoking 30 years ago. She smoked for approximately 30 years. Her family history is of a father who had lung cancer and a brother who had esophageal cancer. Patient underwent CT scan of the abdomen and pelvis to evaluate left upper quadrant abdominal pain on August 20, 2018. The CT revealed a 1.7 x 1 x 2.9  cm cystic lesion in the uncinate process. Due to the patient's age there is concern for pancreatic malignancy. I did discuss the CT finding with the patient's oncologist Dr. Tao Haney. He agrees with the plan for referral to the pancreatic cyst clinic at [facility] which is the family's request.  I did explain to the family the dedicated diagnostic study of the pancreas either CT scan or MRI.   We also discussed the need for biopsy possibly of the mass.  Patient Active Problem List    Diagnosis Date Noted    Pancreatic cyst 09/12/2018    Kidney stone on left side 09/12/2018    History of shingles 02/28/2018    S/P angioplasty with stent 10/02/2017    Non-rheumatic mitral regurgitation 08/28/2017    Status post total replacement of left hip 07/30/2016    Left displaced femoral neck fracture ([address] 07/08/2016    Diffuse large B-cell lymphoma of lymph nodes of multiple regions ([address] 06/14/2016    Pulmonary fibrosis ([address] 06/14/2016    Status post angioplasty with stent 06/14/2016    Mixed hyperlipidemia 06/14/2016    Lupus (systemic lupus erythematosus) ([address] 06/14/2016    Hypercholesterolemia     Essential hypertension, benign     Hypertensive heart disease 08/30/2011    Coronary atherosclerosis of native coronary artery 08/30/2011    Lymphoma, non-Hodgkin's ([address] 05/01/2011    Lupus erythematosus     Sjogren's syndrome ([address]     Chronic airway obstruction (HCC)      Past Medical History:   Diagnosis Date    CAD (coronary artery disease)     Chronic    Chronic airway obstruction, not elsewhere classified     Frequent cough, wheezing secondary to lupus and COPD    Closed left hip fracture (HCC)     Decubitus ulcer of sacral region, stage 3 ([address] 8/18/2016    Dyspnea on exertion     Echocardiogram 12/02/2010    EF 60-65%. Gr 1 DDfx. Mild LVH. Mild MR.    Essential hypertension, benign     GERD (gastroesophageal reflux disease)     Headache(784.0)     History of myocardial perfusion scan 09/14/2012    No evidence of ischemia or infarction. Very mild apical thinning. EF 77%. No WMA. TID 1.46, suggests 3-vessel CAD. Intermediate to high risk pharm stress test due to TID.  Hypercholesterolemia     Lower extremity venous duplex 05/08/2013    Left leg:  No venous thrombosis or venous insufficiency.     Lupus erythematosus 1992    Lymphoma, non-Hodgkin's ([address] 5/2011    Low-grade being followed by Dr. Imer Reilly without therapy currently    Mitral valve prolapse     10/2017    Pneumothorax 1981    Blebs in right lung with recurrent Pneumothorax    S/P cardiac cath 09/24/2012    Hvy calcification of coronary arteries. LM minimal.  mLAD 35%. oD2 70% (unchanged). LCX mild. pRCA mild. Minimal ISR of prev stent. LVEDP 14-16. EF 65%. No WMA.   Likely a falsely abnormal stress test.    Short-term memory loss     From fall in 2003    Sjogren's syndrome ([facility] 1992    report of ROGELIO, SSA, RF positive    Traumatic subdural hematoma ([facility] 2003    Head injury with subdural - s/p fall      Past Surgical History:   Procedure Laterality Date    CARDIAC SURG PROCEDURE UNLIST      cardiac stent    CHEST SURGERY PROCEDURE UNLISTED  1981    RUL removal    HX ADENOIDECTOMY  11/2013    eyelids lifted    HX CATARACT REMOVAL Bilateral     w/ lens implants    HX CORONARY STENT PLACEMENT  8/21/2007    HX GI  02/13/2018    endoscopy    HX HEART CATHETERIZATION  9/24/2012    HX HEART CATHETERIZATION  8/21/2007    [facility] Sadaf Don [facility] - stent placed    HX HEENT  2003    subdural hematoma removed s/p fall    HX HYSTERECTOMY  1974    HX LOBECTOMY Right     upper portion    HX ORTHOPAEDIC  07/2016    Patial left hip replacement    HX PNEUMONECTOMY      partial    NEUROLOGICAL PROCEDURE UNLISTED      subdural hematoma      Family History   Problem Relation Age of Onset    Cancer Father     Cancer Brother       Social History   Substance Use Topics    Smoking status: Former Smoker     Quit date: 1/1/1981    Smokeless tobacco: Never Used    Alcohol use No      Allergies   Allergen Reactions    Avapro [Irbesartan] Other (comments)     Hypotension and dizziness    Contrast Agent [Iodine] Other (comments)     Cant remember    Crestor [Rosuvastatin] Itching and Myalgia    Pcn [Penicillins] Rash     Other reaction(s): hives    Plaquenil [Hydroxychloroquine] Rash    Quinine Other (comments)     Other reaction(s): other/intolerance  \"ringing in my ears\"  Ringing in ear       Prior to Admission medications    Medication Sig Start Date End Date Taking? Authorizing Provider   metoprolol tartrate (LOPRESSOR) 50 mg tablet take 1 tablet by mouth twice a day 8/15/18  Yes Alexandria Pruett MD   citalopram (CELEXA) 10 mg tablet take 1 tablet by mouth once daily 8/9/18  Yes Vivian Casarez MD   furosemide (LASIX) 40 mg tablet take 1 tablet by mouth once daily 7/10/18  Yes Vivian Casarez MD   pravastatin (PRAVACHOL) 10 mg tablet Take 1 Tab by mouth daily. 6/20/18  Yes Vivian Casarez MD   acetaminophen (TYLENOL) 325 mg tablet Take 325 mg by mouth as needed for Pain. Yes Historical Provider   aspirin delayed-release 81 mg tablet Take 81 mg by mouth daily. Yes Mekhi Vasquez MD   polyethylene glycol (MIRALAX) 17 gram packet Take 17 g by mouth daily. Yes Historical Provider   traMADol (ULTRAM) 50 mg tablet Take 1 Tab by mouth every six (6) hours as needed for Pain. Max Daily Amount: 200 mg. 9/12/18   Vivian Casarez MD   traMADol Hina Or) 50 mg tablet Take 1 Tab by mouth every six (6) hours as needed for Pain. Max Daily Amount: 200 mg. 9/12/18   Vivian Casarez MD   COL-RITE 100 mg capsule take 1 capsule by mouth twice a day for 14 days 8/20/18   Historical Provider   dicyclomine (BENTYL) 20 mg tablet take 1 tablet by mouth four times a day if needed (ABDOMINAL PAIN) 8/20/18   Historical Provider   potassium chloride (KAON 20%) 40 mEq/15 mL liqd Take 5 mL by mouth daily. 8/10/18   Vivian Casarez MD   lidocaine (XYLOCAINE) 5 % ointment Apply  2-3 inches to affected area 3-4 times a day 2/28/18   Citlali Bocanegra MD   ranitidine (ZANTAC) 150 mg tablet Take 1 Tab by mouth daily. 8/10/16   Adalgisa Reeves NP       Review of Systems:    14 systems were reviewed. The results are as above in the HPI and otherwise negative.      Objective:     Vitals:    09/18/18 0846   BP: 122/82   Pulse: 70   Resp: 18   Temp: 98.1 °F (36.7 °C)   Weight: 55.3 kg (122 lb)   Height: 5' 5\" (1.651 m)       Physical Exam:  GENERAL: alert, cooperative, no distress, appears stated age,   EYE: conjunctivae/corneas clear. PERRL, EOM's intact. THROAT & NECK: normal and no erythema or exudates noted. ,    LYMPHATIC: Cervical, supraclavicular, and axillary nodes normal. ,   LUNG: clear to auscultation bilaterally,   HEART: regular rate and rhythm, S1, S2 normal, no murmur, click, rub or gallop,   ABDOMEN: soft, non-distended, tender right upper quadrant to deep palpation. . Bowel sounds normal. No masses,  no organomegaly,   EXTREMITIES:  extremities normal, atraumatic, no cyanosis or edema,   SKIN: Normal.,   NEUROLOGIC: AOx3. Cranial nerves 2-12 and sensation grossly intact. ,     Data Review:  to be done    Ms. Nessa Watters has a reminder for a \"due or due soon\" health maintenance. I have asked that she contact her primary care provider for follow-up on this health maintenance. Impression:     · Patient with a large mass in the uncinate process of the pancreas which is concerning for possible malignancy. Plan:     · CA-19-9, CEA level, liver function tests, CBC  · Referral to [facility] pancreatic cyst clinic.     Signed By: Daryl Overton MD     September 19, 2018

## 2018-09-20 LAB — CEA SERPL-MCNC: 1.8 NG/ML

## 2018-09-24 NOTE — TELEPHONE ENCOUNTER
Spoke with Ms. Garcia. She states that pt has stopped tramadol. She is now taking tylenol 500 mg in am and ibuprofen 600 mg pm. MsEduardo Naveed Queen states pt seems to be walking better now. She also states that Dr. [facility] office has sent pt's records to [facility] in regards to pancreatic mass. Ms. Naveed Queen also reports that pt is now taking the potassium pills instead of liquid. She will check to see if a refill is needed.

## 2018-12-07 ENCOUNTER — OFFICE VISIT (OUTPATIENT)
Dept: CARDIOLOGY CLINIC | Age: 83
End: 2018-12-07

## 2018-12-07 VITALS
DIASTOLIC BLOOD PRESSURE: 74 MMHG | OXYGEN SATURATION: 97 % | BODY MASS INDEX: 21.33 KG/M2 | HEART RATE: 64 BPM | WEIGHT: 128 LBS | SYSTOLIC BLOOD PRESSURE: 126 MMHG | HEIGHT: 65 IN

## 2018-12-07 DIAGNOSIS — F03.90 DEMENTIA WITHOUT BEHAVIORAL DISTURBANCE, UNSPECIFIED DEMENTIA TYPE: ICD-10-CM

## 2018-12-07 DIAGNOSIS — R06.02 SOB (SHORTNESS OF BREATH): ICD-10-CM

## 2018-12-07 DIAGNOSIS — M32.9 SLE-SJOGREN OVERLAP SYNDROME (HCC): ICD-10-CM

## 2018-12-07 DIAGNOSIS — E78.2 MIXED HYPERLIPIDEMIA: ICD-10-CM

## 2018-12-07 DIAGNOSIS — R53.83 FATIGUE, UNSPECIFIED TYPE: ICD-10-CM

## 2018-12-07 DIAGNOSIS — I25.10 ATHEROSCLEROSIS OF NATIVE CORONARY ARTERY OF NATIVE HEART WITHOUT ANGINA PECTORIS: Primary | ICD-10-CM

## 2018-12-07 DIAGNOSIS — Z95.820 S/P ANGIOPLASTY WITH STENT: ICD-10-CM

## 2018-12-07 DIAGNOSIS — I11.9 BENIGN HYPERTENSIVE HEART DISEASE WITHOUT HEART FAILURE: ICD-10-CM

## 2018-12-07 DIAGNOSIS — M35.00 SLE-SJOGREN OVERLAP SYNDROME (HCC): ICD-10-CM

## 2018-12-07 DIAGNOSIS — I34.0 MITRAL VALVE INSUFFICIENCY, UNSPECIFIED ETIOLOGY: ICD-10-CM

## 2018-12-07 NOTE — PROGRESS NOTES
HPI:  I saw Chaka Munson in my office today in cardiovascular evaluation regarding her chronic ischemic heart disease.  Ms. Darling Fuller is a pleasant 80 year old [facility] [place] female with history of hypertension, hypercholesterolemia, erythrocytosis, Sjogren's syndrome, non Hodgkin's lymphoma, and coronary artery disease, status post stenting of a right coronary artery back in August of 2007. She has had a lot of problems with shortness of breath and fatigue over the years, which I have felt have been multifactorial. She has had two separate cardiac catheterizations since her angioplasty due to these symptoms, one in 2011 and another one in September, 2012, both of which demonstrated nonobstructive coronary artery disease without recurrent in stent restenosis.    
She does have some chronic fibrotic rales in her right base and history of a right upper lobectomy for blebs back in 0990, so certainly part of her problem could be pulmonary and her O2 saturation on room air was 91 % when I saw her on December 1, 2017 but it is 95% today.  
  
When I saw her in the office December 2015 she seemed to be having the same shortness of breath issues and some desaturation with ambulation that she had when I saw her again in the office in December 2017 suggesting that this was a pulmonary process. However, in the past few years she has been living [place] in [place] and saw Dr. Abel Cruz for her increased shortness of breath and apparently was found to have a significant mitral regurgitation murmur which prompted an echocardiogram August 18, 2017 which revealed moderate left atrial enlargement and prolapse of the anterior cusp of the mitral valve with severe mitral regurgitation.  In reviewing her echo from back in December 2010 she had only mild mitral regurgitation at that time and in reviewing my note of December 15, 2015 she did not appear to have a significant heart murmur. Tamar Ramirez when I saw her on December 1, 2017 she clearly appeared to have a significant mitral regurgitation murmur but she was having some dementia issues at that time and did not appear to be a candidate for any type of intervention, so I did not do any further cardiac workup at that time. She comes in today and relates that she is doing reasonably well. She continues to have shortness of breath on exertion and fatigue and she also has some orthopnea and a little lower extremity edema, but all of these symptoms have been remaining at about the same level and are only mild to moderate in severity. Her latest echocardiogram which was completed on June 12, 2018 demonstrated normal left ventricular size with mild left ventricular hypertrophy and completely normal left ventricular systolic function with ejection fraction of 55-60% range together with stage II diastolic dysfunction. She had moderate left atrial enlargement and mild to moderate mitral regurgitation at that time with only mild elevation of right ventricular pressures at 41 mmHg. Encounter Diagnoses Name Primary?  Atherosclerosis of native coronary artery of native heart without angina pectoris Yes  S/P angioplasty with stent  Hypertensive heart disease  Mitral valve insufficiency, mild to moderate  Mixed hyperlipidemia  Dementia without behavioral disturbance, unspecified dementia type  SLE-Sjogren overlap syndrome ([facility]  Fatigue, unspecified type  SOB (shortness of breath) Discussion: This lady continues to have problems with fatigue which if anything is getting a little bit worse.   The auscultatory examination of her heart does not suggest that her mitral regurgitation is any worse and her most recent echo demonstrated completely normal left ventricular size and function with only mild to moderate mitral regurgitation, so it really does not appear as if her mitral regurgitation is creating shortness of breath issues and she continues in sinus rhythm which is another reason one should suspect that her mitral regurgitation is not that severe. She historically has not had significant coronary disease but we have not done any testing on her in several years so I am going to do a pharmacologic myocardial perfusion study to see if there is any signs of ischemia which could explain her fatigue issues. Her latest lipid profile which was completed on September 7, 2018 was reasonably good with total cholesterol 163, triglycerides of 63, HDL of 74, LDL of 74.4, and VLDL of 12.6 which I think is reasonably good control on only Pravachol 10 mg daily so I am not can make any changes at this time. Her blood pressure appears to be well controlled today and her EKG remains stable, so I am simply going to do the above nuclear myocardial perfusion study unless that is significantly abnormal I will plan to see the patient again in 6 months. PCP:   Joey Lopez MD 
 
 
Past Medical History:  
Diagnosis Date  CAD (coronary artery disease) Chronic  Chronic airway obstruction, not elsewhere classified Frequent cough, wheezing secondary to lupus and COPD  Closed left hip fracture ([address]  Decubitus ulcer of sacral region, stage 3 ([address] 8/18/2016  Dyspnea on exertion  Echocardiogram 12/02/2010 EF 60-65%. Gr 1 DDfx. Mild LVH. Mild MR.  
 Essential hypertension, benign  GERD (gastroesophageal reflux disease)  Headache(784.0)  History of myocardial perfusion scan 09/14/2012 No evidence of ischemia or infarction. Very mild apical thinning. EF 77%. No WMA. TID 1.46, suggests 3-vessel CAD. Intermediate to high risk pharm stress test due to TID.  Hypercholesterolemia  Lower extremity venous duplex 05/08/2013 Left leg:  No venous thrombosis or venous insufficiency.  Lupus erythematosus 1992  Lymphoma, non-Hodgkin's ([facility] 5/2011 Low-grade being followed by Dr. Adrian Nelson without therapy currently  Mitral valve prolapse 10/2017  Pneumothorax 1981 Blebs in right lung with recurrent Pneumothorax  S/P cardiac cath 09/24/2012 Hvy calcification of coronary arteries. LM minimal.  mLAD 35%. oD2 70% (unchanged). LCX mild. pRCA mild. Minimal ISR of prev stent. LVEDP 14-16. EF 65%. No WMA. Likely a falsely abnormal stress test.  
 Short-term memory loss From fall in 2003  Sjogren's syndrome ([facility] 1992  
 report of ROGELIO, SSA, RF positive  Traumatic subdural hematoma ([facility] 2003 Head injury with subdural - s/p fall Past Surgical History:  
Procedure Laterality Date  CARDIAC SURG PROCEDURE UNLIST    
 cardiac stent [address] RUL removal  
 HX ADENOIDECTOMY  11/2013  
 eyelids lifted  HX CATARACT REMOVAL Bilateral   
 w/ lens implants  HX CORONARY STENT PLACEMENT  8/21/2007  HX GI  02/13/2018  
 endoscopy  HX HEART CATHETERIZATION  9/24/2012  HX HEART CATHETERIZATION  8/21/2007 Dr[facility] - stent placed  HX HEENT  2003  
 subdural hematoma removed s/p fall [facility]  HX LOBECTOMY Right   
 upper portion  HX ORTHOPAEDIC  07/2016 Patial left hip replacement  HX PNEUMONECTOMY partial  
 NEUROLOGICAL PROCEDURE UNLISTED    
 subdural hematoma Current Outpatient Medications Medication Sig  
 COL-RITE 100 mg capsule take 1 capsule by mouth twice a day for 14 days  metoprolol tartrate (LOPRESSOR) 50 mg tablet take 1 tablet by mouth twice a day  potassium chloride (KAON 20%) 40 mEq/15 mL liqd Take 5 mL by mouth daily.  furosemide (LASIX) 40 mg tablet take 1 tablet by mouth once daily  pravastatin (PRAVACHOL) 10 mg tablet Take 1 Tab by mouth daily.  lidocaine (XYLOCAINE) 5 % ointment Apply  2-3 inches to affected area 3-4 times a day  acetaminophen (TYLENOL) 325 mg tablet Take 325 mg by mouth as needed for Pain.  aspirin delayed-release 81 mg tablet Take 81 mg by mouth daily.  polyethylene glycol (MIRALAX) 17 gram packet Take 17 g by mouth daily.  ranitidine (ZANTAC) 150 mg tablet Take 1 Tab by mouth daily. No current facility-administered medications for this visit. Allergies Allergen Reactions  Avapro [Irbesartan] Other (comments) Hypotension and dizziness  Contrast Agent [Iodine] Other (comments) Cant remember  Crestor [Rosuvastatin] Itching and Myalgia  Pcn [Penicillins] Rash Other reaction(s): hives  Plaquenil [Hydroxychloroquine] Rash  Quinine Other (comments) Other reaction(s): other/intolerance \"ringing in my ears\" Ringing in ear Review of Systems:  
Constitutional: Positive for malaise/fatigue and weight loss. Negative for chills and fever. Respiratory: Positive for cough and shortness of breath. Negative for hemoptysis and wheezing. Cardiovascular: Positive for orthopnea and leg swelling. Negative for chest pain and palpitations. Gastrointestinal: Positive for abdominal pain and constipation. Negative for blood in stool, diarrhea, heartburn, melena, nausea and vomiting. Musculoskeletal: Positive for joint pain. Negative for falls and myalgias. Neurological: Positive for dizziness. Physical Exam:   
Visit Vitals /74 Pulse 64 Ht 5' 5\" (1.651 m) Wt 58.1 kg (128 lb) SpO2 97% BMI 21.30 kg/m² The patient was a cooperative, alert, well-developed, well-nourished, 43-year-old, [facility] American female, who is in no acute distress at the time of the examination. HEENT: Conjuctiva white, mucosa moist, no pallor or cyanosis. NECK: Supple without masses, tenderness or thyromegaly. There was no jugular venous distention. Carotid are full bilaterally without bruits. CHEST: Symmetrical with good excursion. LUNGS: Clear to auscultation in all fields. Few chronic rales in the right base HEART: The apex is not displaced. There were no lifts, thrills or heaves. There is a normal S1 and S2. There is a grade 2/6 holosystolic murmur at the apex with radiation to the axilla without appreciable diastolic murmurs, rubs, clicks, or gallops auscultated. ABDOMEN: Soft without masses, tenderness or organomegaly. EXTREMITIES: Full peripheral pulses with trace to 1 +  peripheral edema of the left leg with minimal on the right. Review of Data: Please refer to past medical history for most recent cardiac testing. Lab Results Component Value Date/Time Cholesterol, total 163 09/07/2018 12:20 PM  
 HDL Cholesterol 74 (H) 09/07/2018 12:20 PM  
 LDL, calculated 76.4 09/07/2018 12:20 PM  
 Triglyceride 63 09/07/2018 12:20 PM  
 CHOL/HDL Ratio 2.2 09/07/2018 12:20 PM  
 
 
 
Results for orders placed or performed in visit on 12/07/18 AMB POC EKG ROUTINE W/ 12 LEADS, INTER & REP     Status: None Narrative Normal sinus rhythm, rate 64. Diffuse nonspecific ST-T flattening. This tracing is quite similar to the tracing of June 4, 2018. Jyoti Marion D.O., F.A.C.C. [facility] [place] [address] [address] [facility] [address] Rosi Riggs [phone] PLEASE NOTE:  This document has been produced using voice recognition software. Unrecognized errors in transcription may be present.

## 2018-12-12 ENCOUNTER — OFFICE VISIT (OUTPATIENT)
Dept: FAMILY MEDICINE CLINIC | Age: 83
End: 2018-12-12

## 2018-12-12 VITALS
OXYGEN SATURATION: 99 % | HEART RATE: 60 BPM | SYSTOLIC BLOOD PRESSURE: 146 MMHG | WEIGHT: 129.4 LBS | DIASTOLIC BLOOD PRESSURE: 80 MMHG | TEMPERATURE: 97.6 F | BODY MASS INDEX: 21.56 KG/M2 | HEIGHT: 65 IN

## 2018-12-12 DIAGNOSIS — I10 ESSENTIAL HYPERTENSION, BENIGN: Primary | ICD-10-CM

## 2018-12-12 DIAGNOSIS — K59.04 CHRONIC IDIOPATHIC CONSTIPATION: ICD-10-CM

## 2018-12-12 DIAGNOSIS — K21.9 GASTROESOPHAGEAL REFLUX DISEASE WITHOUT ESOPHAGITIS: ICD-10-CM

## 2018-12-12 RX ORDER — POLYETHYLENE GLYCOL 3350 17 G/17G
17 POWDER, FOR SOLUTION ORAL DAILY
Qty: 100 PACKET | Refills: 3 | Status: SHIPPED | OUTPATIENT
Start: 2018-12-12

## 2018-12-12 RX ORDER — RANITIDINE 150 MG/1
150 TABLET, FILM COATED ORAL DAILY
Qty: 30 TAB | Refills: 0 | Status: SHIPPED | OUTPATIENT
Start: 2018-12-12 | End: 2019-01-10 | Stop reason: SDUPTHER

## 2018-12-12 NOTE — PROGRESS NOTES
Patient here for 6 month f/u on her cholesterol she is asking for refills today, no other concerns. 1. Have you been to the ER, urgent care clinic since your last visit? Hospitalized since your last visit? No  2. Have you seen or consulted any other health care providers outside of the [address] [place] [address] since your last visit? Include any pap smears or colon screening. Yes When: 11/18/18 Where: [facility] Reason for visit: pancreatic cyst    Health Maintenance reviewed - Will discuss with provider.

## 2018-12-12 NOTE — PROGRESS NOTES
Dionicio Chawla is a 80 y.o. female  presents for follow up. No new complaints. Allergies   Allergen Reactions    Avapro [Irbesartan] Other (comments)     Hypotension and dizziness    Contrast Agent [Iodine] Other (comments)     Cant remember    Crestor [Rosuvastatin] Itching and Myalgia    Pcn [Penicillins] Rash     Other reaction(s): hives    Plaquenil [Hydroxychloroquine] Rash    Quinine Other (comments)     Other reaction(s): other/intolerance  \"ringing in my ears\"  Ringing in ear     Outpatient Medications Marked as Taking for the 12/12/18 encounter (Office Visit) with Betsey Ramirez MD   Medication Sig Dispense Refill    COL-RITE 100 mg capsule take 1 capsule by mouth twice a day for 14 days  0    metoprolol tartrate (LOPRESSOR) 50 mg tablet take 1 tablet by mouth twice a day 180 Tab 0    potassium chloride (KAON 20%) 40 mEq/15 mL liqd Take 5 mL by mouth daily. 480 mL 1    furosemide (LASIX) 40 mg tablet take 1 tablet by mouth once daily 30 Tab 5    pravastatin (PRAVACHOL) 10 mg tablet Take 1 Tab by mouth daily. 90 Tab 3    lidocaine (XYLOCAINE) 5 % ointment Apply  2-3 inches to affected area 3-4 times a day 120 g 0    acetaminophen (TYLENOL) 325 mg tablet Take 325 mg by mouth as needed for Pain.  aspirin delayed-release 81 mg tablet Take 81 mg by mouth daily.  polyethylene glycol (MIRALAX) 17 gram packet Take 17 g by mouth daily.  ranitidine (ZANTAC) 150 mg tablet Take 1 Tab by mouth daily.  30 Tab 0     Patient Active Problem List   Diagnosis Code    Lupus erythematosus L93.0    Sjogren's syndrome ([facility] [address] M35.00    Chronic airway obstruction (HCC) J44.9    Hypertensive heart disease I11.9    Coronary atherosclerosis of native coronary artery I25.10    Lymphoma, non-Hodgkin's (HCC) C85.90    Hypercholesterolemia E78.00    Essential hypertension, benign I10    Left displaced femoral neck fracture ([facility]) S72.002A    Diffuse large B-cell lymphoma of lymph nodes of multiple regions 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 Gr 1 DDfx. Mild LVH. Mild MR.    Essential hypertension, benign     GERD (gastroesophageal reflux disease)     Headache(784.0)     History of myocardial perfusion scan 09/14/2012    No evidence of ischemia or infarction. Very mild apical thinning. EF 77%. No WMA. TID 1.46, suggests 3-vessel CAD. Intermediate to high risk pharm stress test due to TID.  Hypercholesterolemia     Lower extremity venous duplex 05/08/2013    Left leg:  No venous thrombosis or venous insufficiency.  Lupus erythematosus 1992    Lymphoma, non-Hodgkin's ([facility] [address] 5/2011    Low-grade being followed by Dr. Black Betts without therapy currently    Mitral valve prolapse     10/2017    Pneumothorax 1981    Blebs in right lung with recurrent Pneumothorax    S/P cardiac cath 09/24/2012    Hvy calcification of coronary arteries. LM minimal.  mLAD 35%. oD2 70% (unchanged). LCX mild. pRCA mild. Minimal ISR of prev stent. LVEDP 14-16. EF 65%. No WMA.   Likely a falsely abnormal stress test.    Short-term memory loss     From fall in 2003    Sjogren's syndrome ([facility] [address] 1992    report of ROGELIO, SSA, RF positive    [facility] 2003 Head injury with subdural - s/p fall     Social History     Socioeconomic History    Marital status:      Spouse name: Not on file    Number of children: Not on file    Years of education: Not on file    Highest education level: Not on file   Tobacco Use    Smoking status: Former Smoker     Last attempt to quit: 1981     Years since quittin.9    Smokeless tobacco: Never Used   Substance and Sexual Activity    Alcohol use: No    Drug use: No    Sexual activity: Not Currently   Social History Narrative    ** Merged History Encounter **          Family History   Problem Relation Age of Onset    Cancer Father     Cancer Brother         Review of Systems   Constitutional: Negative for chills, fever, malaise/fatigue and weight loss. Eyes: Negative for blurred vision. Respiratory: Negative for shortness of breath and wheezing. Cardiovascular: Negative for chest pain. Gastrointestinal: Negative for nausea and vomiting. Musculoskeletal: Negative for myalgias. Skin: Negative for rash. Neurological: Negative for weakness. Psychiatric/Behavioral: Negative. Negative for depression, hallucinations, substance abuse and suicidal ideas. The patient is not nervous/anxious. Vitals:    18 1731   BP: 146/80   Pulse: 60   Temp: 97.6 °F (36.4 °C)   TempSrc: Oral   SpO2: 99%   Weight: 129 lb 6.4 oz (58.7 kg)   Height: 5' 5\" (1.651 m)   PainSc:  10 - Worst pain ever   PainLoc: Back       Physical Exam   Constitutional: She is oriented to person, place, and time and well-developed, well-nourished, and in no distress. Neck: Normal range of motion. Neck supple. No thyromegaly present. Cardiovascular: Normal rate, regular rhythm and normal heart sounds. Pulmonary/Chest: Effort normal and breath sounds normal.   Musculoskeletal: Normal range of motion. Neurological: She is alert and oriented to person, place, and time. Skin: Skin is warm and dry.    Psychiatric: Mood, memory, affect and judgment normal.   Nursing note and vitals reviewed. Assessment/Plan      ICD-10-CM ICD-9-CM    1. Essential hypertension, benign I10 401.1 Stable    2. Chronic idiopathic constipation K59.04 564.00 polyethylene glycol (MIRALAX) 17 gram packet   3. Gastroesophageal reflux disease without esophagitis K21.9 530.81 raNITIdine (ZANTAC) 150 mg tablet     I have discussed the diagnosis with the patient and the intended plan of care as seen in the above orders. The patient has received an after-visit summary and questions were answered concerning future plans. I have discussed medication, side effects, and warnings with the patient in detail. The patient verbalized understanding and is in agreement with the plan of care. The patient will contact the office with any additional concerns. Follow-up Disposition:  Return in about 6 months (around 6/12/2019).   lab results and schedule of future lab studies reviewed with patient    Raya Burns MD

## 2018-12-12 NOTE — PATIENT INSTRUCTIONS
Constipation: Care Instructions  Your Care Instructions    Constipation means that you have a hard time passing stools (bowel movements). People pass stools from 3 times a day to once every 3 days. What is normal for you may be different. Constipation may occur with pain in the rectum and cramping. The pain may get worse when you try to pass stools. Sometimes there are small amounts of bright red blood on toilet paper or the surface of stools. This is because of enlarged veins near the rectum (hemorrhoids). A few changes in your diet and lifestyle may help you avoid ongoing constipation. Your doctor may also prescribe medicine to help loosen your stool. Some medicines can cause constipation. These include pain medicines and antidepressants. Tell your doctor about all the medicines you take. Your doctor may want to make a medicine change to ease your symptoms. Follow-up care is a key part of your treatment and safety. Be sure to make and go to all appointments, and call your doctor if you are having problems. It's also a good idea to know your test results and keep a list of the medicines you take. How can you care for yourself at home? · Drink plenty of fluids, enough so that your urine is light yellow or clear like water. If you have kidney, heart, or liver disease and have to limit fluids, talk with your doctor before you increase the amount of fluids you drink. · Include high-fiber foods in your diet each day. These include fruits, vegetables, beans, and whole grains. · Get at least 30 minutes of exercise on most days of the week. Walking is a good choice. You also may want to do other activities, such as running, swimming, cycling, or playing tennis or team sports. · Take a fiber supplement, such as Citrucel or Metamucil, every day. Read and follow all instructions on the label. · Schedule time each day for a bowel movement. A daily routine may help.  Take your time having your bowel movement. · Support your feet with a small step stool when you sit on the toilet. This helps flex your hips and places your pelvis in a squatting position. · Your doctor may recommend an over-the-counter laxative to relieve your constipation. Examples are Milk of Magnesia and MiraLax. Read and follow all instructions on the label. Do not use laxatives on a long-term basis. When should you call for help? Call your doctor now or seek immediate medical care if:    · You have new or worse belly pain.     · You have new or worse nausea or vomiting.     · You have blood in your stools.    Watch closely for changes in your health, and be sure to contact your doctor if:    · Your constipation is getting worse.     · You do not get better as expected. Where can you learn more? Go to http://melvina-bridger.info/. Enter 21 [phone] in the search box to learn more about \"Constipation: Care Instructions. \"  Current as of: November 20, 2017  Content Version: 11.8  © 8514-3844 [employer]. Care instructions adapted under license by [employer] (which disclaims liability or warranty for this information). If you have questions about a medical condition or this instruction, always ask your healthcare professional. [address] any warranty or liability for your use of this information.

## 2018-12-18 ENCOUNTER — HOSPITAL ENCOUNTER (OUTPATIENT)
Dept: NON INVASIVE DIAGNOSTICS | Age: 83
Discharge: HOME | End: 2018-12-18
Attending: INTERNAL MEDICINE
Payer: MEDICARE

## 2018-12-18 VITALS
HEIGHT: 65 IN | WEIGHT: 129 LBS | SYSTOLIC BLOOD PRESSURE: 140 MMHG | BODY MASS INDEX: 21.49 KG/M2 | DIASTOLIC BLOOD PRESSURE: 80 MMHG

## 2018-12-18 DIAGNOSIS — I25.10 ATHEROSCLEROSIS OF NATIVE CORONARY ARTERY OF NATIVE HEART WITHOUT ANGINA PECTORIS: ICD-10-CM

## 2018-12-18 DIAGNOSIS — R53.83 FATIGUE, UNSPECIFIED TYPE: ICD-10-CM

## 2018-12-18 DIAGNOSIS — R06.02 SOB (SHORTNESS OF BREATH): ICD-10-CM

## 2018-12-18 LAB
STRESS BASELINE DIAS BP: 80 MMHG
STRESS BASELINE HR: 68 BPM
STRESS BASELINE SYS BP: 140 MMHG
STRESS ESTIMATED WORKLOAD: 1 METS
STRESS EXERCISE DUR MIN: NORMAL
STRESS PEAK DIAS BP: 60 MMHG
STRESS PEAK SYS BP: 142 MMHG
STRESS PERCENT HR ACHIEVED: 78 %
STRESS POST PEAK HR: 88 BPM
STRESS RATE PRESSURE PRODUCT: NORMAL BPM*MMHG
STRESS ST DEPRESSION: 0 MM
STRESS ST ELEVATION: 0 MM
STRESS TARGET HR: 113 BPM

## 2018-12-18 PROCEDURE — 93017 CV STRESS TEST TRACING ONLY: CPT

## 2018-12-18 PROCEDURE — 74011250636 HC RX REV CODE- 250/636: Performed by: INTERNAL MEDICINE

## 2018-12-18 RX ORDER — SODIUM CHLORIDE 9 MG/ML
250 INJECTION, SOLUTION INTRAVENOUS ONCE
Status: COMPLETED | OUTPATIENT
Start: 2018-12-18 | End: 2018-12-18

## 2018-12-18 RX ORDER — SODIUM CHLORIDE 0.9 % (FLUSH) 0.9 %
10 SYRINGE (ML) INJECTION AS NEEDED
Status: COMPLETED | OUTPATIENT
Start: 2018-12-18 | End: 2018-12-18

## 2018-12-18 RX ADMIN — REGADENOSON 0.4 MG: 0.08 INJECTION, SOLUTION INTRAVENOUS at 08:40

## 2018-12-18 RX ADMIN — SODIUM CHLORIDE 250 ML/HR: 900 INJECTION, SOLUTION INTRAVENOUS at 08:40

## 2018-12-18 RX ADMIN — Medication 10 ML: at 07:20

## 2018-12-18 NOTE — PROGRESS NOTES
Patient was injected with 11.6 millicuries 49RQW Sestamibi on 12/18/18 at 0720. Patient was injected with 35.6 millicuries 91IKE Sestamibi on 12/18/18 at [address] [facility]. Patient's armbands were removed and placed in shred-it box.     Patient had a Nuclear Lexiscan Stress Test.

## 2018-12-24 ENCOUNTER — TELEPHONE (OUTPATIENT)
Dept: CARDIOLOGY CLINIC | Age: 83
End: 2018-12-24

## 2018-12-27 NOTE — PROGRESS NOTES
Per your last note \" This lady continues to have problems with fatigue which if anything is getting a little bit worse. The auscultatory examination of her heart does not suggest that her mitral regurgitation is any worse and her most recent echo demonstrated completely normal left ventricular size and function with only mild to moderate mitral regurgitation, so it really does not appear as if her mitral regurgitation is creating shortness of breath issues and she continues in sinus rhythm which is another reason one should suspect that her mitral regurgitation is not that severe. She historically has not had significant coronary disease but we have not done any testing on her in several years so I am going to do a pharmacologic myocardial perfusion study to see if there is any signs of ischemia which could explain her fatigue issues.

## 2019-01-02 NOTE — PROGRESS NOTES
I called and discussed this with the patient's daughter a week ago. She told me that her mother was visiting another family member for the Christmas holiday and that she would let her know the results.  ES

## 2019-01-10 DIAGNOSIS — K21.9 GASTROESOPHAGEAL REFLUX DISEASE WITHOUT ESOPHAGITIS: ICD-10-CM

## 2019-01-11 RX ORDER — METOPROLOL TARTRATE 50 MG/1
TABLET ORAL
Qty: 180 TAB | Refills: 0 | Status: SHIPPED | OUTPATIENT
Start: 2019-01-11 | End: 2019-04-18 | Stop reason: SDUPTHER

## 2019-01-11 RX ORDER — RANITIDINE 150 MG/1
TABLET, FILM COATED ORAL
Qty: 30 TAB | Refills: 0 | Status: SHIPPED | OUTPATIENT
Start: 2019-01-11

## 2019-01-31 ENCOUNTER — TELEPHONE (OUTPATIENT)
Dept: FAMILY MEDICINE CLINIC | Age: 84
End: 2019-01-31

## 2019-01-31 DIAGNOSIS — I10 ESSENTIAL HYPERTENSION, BENIGN: ICD-10-CM

## 2019-02-01 NOTE — TELEPHONE ENCOUNTER
This patients caretaker, Omid Lowe contacted office for the following prescriptions to be filled:    Medication requested : POTASSIUM TABLETS  NOT THE LIQUID    PCP: Dr. Nicolás Soliman or Print: PHARMACY  Mail order or Local pharmacy: [facility]    Scheduled appointment if not seen by current providers in office: Last ov 12/12/18 next appointment is 7/1/19. Please advise. Also, please have Dr. Laura Ling discontinue the liquid per patient.

## 2019-02-08 RX ORDER — POTASSIUM CHLORIDE 750 MG/1
10 TABLET, EXTENDED RELEASE ORAL DAILY
Qty: 30 TAB | Refills: 2 | Status: SHIPPED | OUTPATIENT
Start: 2019-02-08 | End: 2019-05-17 | Stop reason: SDUPTHER

## 2019-02-28 NOTE — PATIENT INSTRUCTIONS
Dr Jose Maria Martinez  Pediatric Gastroenterology  [facility]  [phone]   High Blood Pressure: Care Instructions  Your Care Instructions    If your blood pressure is usually above 140/90, you have high blood pressure, or hypertension. That means the top number is 140 or higher or the bottom number is 90 or higher, or both. Despite what a lot of people think, high blood pressure usually doesn't cause headaches or make you feel dizzy or lightheaded. It usually has no symptoms. But it does increase your risk for heart attack, stroke, and kidney or eye damage. The higher your blood pressure, the more your risk increases. Your doctor will give you a goal for your blood pressure. Your goal will be based on your health and your age. An example of a goal is to keep your blood pressure below 140/90. Lifestyle changes, such as eating healthy and being active, are always important to help lower blood pressure. You might also take medicine to reach your blood pressure goal.  Follow-up care is a key part of your treatment and safety. Be sure to make and go to all appointments, and call your doctor if you are having problems. It's also a good idea to know your test results and keep a list of the medicines you take. How can you care for yourself at home? Medical treatment  · If you stop taking your medicine, your blood pressure will go back up. You may take one or more types of medicine to lower your blood pressure. Be safe with medicines. Take your medicine exactly as prescribed. Call your doctor if you think you are having a problem with your medicine. · Talk to your doctor before you start taking aspirin every day. Aspirin can help certain people lower their risk of a heart attack or stroke. But taking aspirin isn't right for everyone, because it can cause serious bleeding. · See your doctor regularly. You may need to see the doctor more often at first or until your blood pressure comes down.   · If you are taking blood pressure medicine, talk to your doctor before you take decongestants or anti-inflammatory medicine, such as ibuprofen. Some of these medicines can raise blood pressure. · Learn how to check your blood pressure at home. Lifestyle changes  · Stay at a healthy weight. This is especially important if you put on weight around the waist. Losing even 10 pounds can help you lower your blood pressure. · If your doctor recommends it, get more exercise. Walking is a good choice. Bit by bit, increase the amount you walk every day. Try for at least 30 minutes on most days of the week. You also may want to swim, bike, or do other activities. · Avoid or limit alcohol. Talk to your doctor about whether you can drink any alcohol. · Try to limit how much sodium you eat to less than 2,300 milligrams (mg) a day. Your doctor may ask you to try to eat less than 1,500 mg a day. · Eat plenty of fruits (such as bananas and oranges), vegetables, legumes, whole grains, and low-fat dairy products. · Lower the amount of saturated fat in your diet. Saturated fat is found in animal products such as milk, cheese, and meat. Limiting these foods may help you lose weight and also lower your risk for heart disease. · Do not smoke. Smoking increases your risk for heart attack and stroke. If you need help quitting, talk to your doctor about stop-smoking programs and medicines. These can increase your chances of quitting for good. When should you call for help? Call 911 anytime you think you may need emergency care. This may mean having symptoms that suggest that your blood pressure is causing a serious heart or blood vessel problem. Your blood pressure may be over 180/110. ? For example, call 911 if:  ? · You have symptoms of a heart attack. These may include:  ¨ Chest pain or pressure, or a strange feeling in the chest.  ¨ Sweating. ¨ Shortness of breath. ¨ Nausea or vomiting.   ¨ Pain, pressure, or a strange feeling in the back, neck, jaw, or upper belly or in one or both shoulders or arms.  ¨ Lightheadedness or sudden weakness. ¨ A fast or irregular heartbeat. ? · You have symptoms of a stroke. These may include:  ¨ Sudden numbness, tingling, weakness, or loss of movement in your face, arm, or leg, especially on only one side of your body. ¨ Sudden vision changes. ¨ Sudden trouble speaking. ¨ Sudden confusion or trouble understanding simple statements. ¨ Sudden problems with walking or balance. ¨ A sudden, severe headache that is different from past headaches. ? · You have severe back or belly pain. ?Do not wait until your blood pressure comes down on its own. Get help right away. ?Call your doctor now or seek immediate care if:  ? · Your blood pressure is much higher than normal (such as 180/110 or higher), but you don't have symptoms. ? · You think high blood pressure is causing symptoms, such as:  ¨ Severe headache. ¨ Blurry vision. ? Watch closely for changes in your health, and be sure to contact your doctor if:  ? · Your blood pressure measures 140/90 or higher at least 2 times. That means the top number is 140 or higher or the bottom number is 90 or higher, or both. ? · You think you may be having side effects from your blood pressure medicine. ? · Your blood pressure is usually normal, but it goes above normal at least 2 times. Where can you learn more? Go to http://melvina-bridger.info/. Enter O111 in the search box to learn more about \"High Blood Pressure: Care Instructions. \"  Current as of: September 21, 2016  Content Version: 11.4  © 7375-0378 [employer]. Care instructions adapted under license by [employer] (which disclaims liability or warranty for this information). If you have questions about a medical condition or this instruction, always ask your healthcare professional. [place] [address] any warranty or liability for your use of this information.

## 2019-03-13 ENCOUNTER — OFFICE VISIT (OUTPATIENT)
Dept: FAMILY MEDICINE CLINIC | Age: 84
End: 2019-03-13

## 2019-03-13 VITALS
DIASTOLIC BLOOD PRESSURE: 68 MMHG | HEIGHT: 65 IN | BODY MASS INDEX: 21.47 KG/M2 | HEART RATE: 89 BPM | TEMPERATURE: 98.3 F | OXYGEN SATURATION: 96 % | RESPIRATION RATE: 14 BRPM | SYSTOLIC BLOOD PRESSURE: 134 MMHG

## 2019-03-13 DIAGNOSIS — R05.9 COUGH: ICD-10-CM
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
## 2019-03-13 DIAGNOSIS — R52 BODY ACHES: Primary | ICD-10-CM

## 2019-03-13 RX ORDER — AZITHROMYCIN 250 MG/1
TABLET, FILM COATED ORAL
Qty: 6 TAB | Refills: 0 | Status: SHIPPED | OUTPATIENT
Start: 2019-03-13 | End: 2019-03-18

## 2019-03-13 RX ORDER — BENZONATATE 200 MG/1
200 CAPSULE ORAL
Qty: 24 CAP | Refills: 1 | Status: SHIPPED | OUTPATIENT
Start: 2019-03-13 | End: 2019-03-20

## 2019-03-13 NOTE — PROGRESS NOTES
Chief Complaint   Patient presents with    Cough    Chills    Generalized Body Aches    Fall     Pt's fell last week. She denies hitting her head. Pt's daughter would also like her tested for flu d/t having chills, cough, and body aches.

## 2019-03-13 NOTE — PATIENT INSTRUCTIONS

## 2019-03-13 NOTE — PROGRESS NOTES
Shoaib Grullon is a 80 y.o. female  presents for follow up after she fell. No LOC. She was carrying things and got a little dizzy. She has cough with assoc congestion. No chest pains or SOB. Sxs are getting better. Allergies   Allergen Reactions    Avapro [Irbesartan] Other (comments)     Hypotension and dizziness    Contrast Agent [Iodine] Other (comments)     Cant remember    Crestor [Rosuvastatin] Itching and Myalgia    Pcn [Penicillins] Rash     Other reaction(s): hives    Plaquenil [Hydroxychloroquine] Rash    Quinine Other (comments)     Other reaction(s): other/intolerance  \"ringing in my ears\"  Ringing in ear     Outpatient Medications Marked as Taking for the 3/13/19 encounter (Office Visit) with Shaina Salazar MD   Medication Sig Dispense Refill    potassium chloride (KLOR-CON) 10 mEq tablet Take 1 Tab by mouth daily. 30 Tab 2    raNITIdine (ZANTAC) 150 mg tablet take 1 tablet by mouth once daily 30 Tab 0    metoprolol tartrate (LOPRESSOR) 50 mg tablet take 1 tablet by mouth twice a day 180 Tab 0    polyethylene glycol (MIRALAX) 17 gram packet Take 1 Packet by mouth daily. 100 Packet 3    furosemide (LASIX) 40 mg tablet take 1 tablet by mouth once daily 30 Tab 5    pravastatin (PRAVACHOL) 10 mg tablet Take 1 Tab by mouth daily. 90 Tab 3    lidocaine (XYLOCAINE) 5 % ointment Apply  2-3 inches to affected area 3-4 times a day 120 g 0    acetaminophen (TYLENOL) 325 mg tablet Take 325 mg by mouth as needed for Pain.  aspirin delayed-release 81 mg tablet Take 81 mg by mouth daily.        Patient Active Problem List   Diagnosis Code    Lupus erythematosus L93.0    Sjogren's syndrome ([address] M35.00    Chronic airway obstruction ([address] J44.9    Hypertensive heart disease I11.9    Coronary atherosclerosis of native coronary artery I25.10    Lymphoma, non-Hodgkin's (HCC) C85.90    Hypercholesterolemia E78.00    Essential hypertension, benign I10    Left displaced femoral neck fracture ([facility] S72.002A    Diffuse large B-cell lymphoma of lymph nodes of multiple regions ([facility] C83.38    Pulmonary fibrosis (HCC) J84.10    Status post angioplasty with stent Z95.9    Mixed hyperlipidemia E78.2    Lupus (systemic lupus erythematosus) (HCC) M32.9    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    Non-rheumatic mitral regurgitation I34.0    S/P angioplasty with stent Z95.9    History of shingles Z86.19    Pancreatic cyst K86.2    Kidney stone on left side N20.0    Chronic idiopathic constipation K59.04    Gastroesophageal reflux disease without esophagitis K21.9     Past Medical History:   Diagnosis Date    CAD (coronary artery disease)     Chronic    Chronic airway obstruction, not elsewhere classified     Frequent cough, wheezing secondary to lupus and COPD    Closed left hip fracture (HCC)     Decub[facility] sacral region[facility] 8/18/2016    Dyspnea on exertion     Echocardiogram 12/02/2010    EF 60-65%. Gr 1 DDfx. Mild LVH. Mild MR.    Essential hypertension, benign     GERD (gastroesophageal reflux disease)     Headache(784.0)     History of myocardial perfusion scan 09/14/2012    No evidence of ischemia or infarction. Very mild apical thinning. EF 77%. No WMA. TID 1.46, suggests 3-vessel CAD. Intermediate to high risk pharm stress test due to TID.  Hypercholesterolemia     Lower extremity venous duplex 05/08/2013    Left leg:  No venous thrombosis or venous insufficiency.  Lupus erythematosus 1992    Lymphoma, non-Hodgkin's ([facility] 5/2011    Low-grade being followed by Dr. Khushi Florez without therapy currently    Mitral valve prolapse     10/2017    Pneumothorax 1981    Blebs in right lung with recurrent Pneumothorax    S/P cardiac cath 09/24/2012    Hvy calcification of coronary arteries. LM minimal.  mLAD 35%. oD2 70% (unchanged). LCX mild. pRCA mild. Minimal ISR of prev stent. LVEDP 14-16. EF 65%. No WMA.   Likely a falsely abnormal stress test.    Short-term memory loss     From fall in     Sjogren's syndrome ([facility]     report of ROGELIO, SSA, RF positive    Traumatic subdural hematoma ([facility]     Head injury with subdural - s/p fall     Social History     Socioeconomic History    Marital status:      Spouse name: Not on file    Number of children: Not on file    Years of education: Not on file    Highest education level: Not on file   Tobacco Use    Smoking status: Former Smoker     Last attempt to quit: 1981     Years since quittin.2    Smokeless tobacco: Never Used   Substance and Sexual Activity    Alcohol use: No    Drug use: No    Sexual activity: Not Currently   Social History Narrative    ** Merged History Encounter **          Family History   Problem Relation Age of Onset    Cancer Father     Cancer Brother         Review of Systems   Constitutional: Negative for chills, fever, malaise/fatigue and weight loss. Eyes: Negative for blurred vision. Respiratory: Negative for shortness of breath and wheezing. Cardiovascular: Positive for leg swelling. Negative for chest pain. Gastrointestinal: Negative for nausea and vomiting. Musculoskeletal: Negative for myalgias. Skin: Negative for rash. Neurological: Negative for weakness. Vitals:    19 1548   BP: 134/68   Pulse: 89   Resp: 14   Temp: 98.3 °F (36.8 °C)   SpO2: 96%   Height: 5' 5\" (1.651 m)   PainSc:   7   PainLoc: Generalized       Physical Exam   Constitutional: She is oriented to person, place, and time and well-developed, well-nourished, and in no distress. Neck: Normal range of motion. Neck supple. No thyromegaly present. Cardiovascular: Normal rate and regular rhythm. Exam reveals no gallop and no friction rub. Murmur heard. Pulmonary/Chest: Effort normal and breath sounds normal.   Musculoskeletal: Normal range of motion. She exhibits edema.    Neurological: She is alert and oriented to person, place, and time. Skin: Skin is warm and dry. Psychiatric: Mood, memory, affect and judgment normal.   Nursing note and vitals reviewed. Assessment/Plan      ICD-10-CM ICD-9-CM    1. Body aches R52 780.96 AMB POC RAPID INFLUENZA TEST      azithromycin (ZITHROMAX) 250 mg tablet   2. Fall, initial encounter [address] XXXA E888.9    3. Cough R05 786.2 benzonatate (TESSALON) 200 mg capsule     I have discussed the diagnosis with the patient and the intended plan of care as seen in the above orders. The patient has received an after-visit summary and questions were answered concerning future plans. I have discussed medication, side effects, and warnings with the patient in detail. The patient verbalized understanding and is in agreement with the plan of care. The patient will contact the office with any additional concerns.       Follow-up Disposition:  Return if symptoms worsen or fail to improve.  lab results and schedule of future lab studies reviewed with patient    Candace Dawkins MD

## 2019-04-03 ENCOUNTER — TELEPHONE (OUTPATIENT)
Dept: FAMILY MEDICINE CLINIC | Age: 84
End: 2019-04-03

## 2019-04-03 NOTE — TELEPHONE ENCOUNTER
Patients Daughter, Arlene Cai came into the office on 04/03/19 at 5:05 to inform Dr. Iban Louis, That she hired a Nurse Aid for her mom as he suggested and her mom has fired the aid.  Please call patient daughter if you have any questions at. [phone]

## 2019-04-19 RX ORDER — METOPROLOL TARTRATE 50 MG/1
TABLET ORAL
Qty: 180 TAB | Refills: 0 | Status: SHIPPED | OUTPATIENT
Start: 2019-04-19

## 2019-05-01 ENCOUNTER — TELEPHONE (OUTPATIENT)
Dept: FAMILY MEDICINE CLINIC | Age: 84
End: 2019-05-01

## 2019-05-01 ENCOUNTER — OFFICE VISIT (OUTPATIENT)
Dept: FAMILY MEDICINE CLINIC | Age: 84
End: 2019-05-01

## 2019-05-01 VITALS
HEART RATE: 68 BPM | RESPIRATION RATE: 14 BRPM | DIASTOLIC BLOOD PRESSURE: 68 MMHG | OXYGEN SATURATION: 96 % | WEIGHT: 129 LBS | BODY MASS INDEX: 21.49 KG/M2 | HEIGHT: 65 IN | SYSTOLIC BLOOD PRESSURE: 142 MMHG

## 2019-05-01 DIAGNOSIS — L93.2 OTHER LOCAL LUPUS ERYTHEMATOSUS: ICD-10-CM

## 2019-05-01 DIAGNOSIS — I11.9 BENIGN HYPERTENSIVE HEART DISEASE WITHOUT HEART FAILURE: ICD-10-CM

## 2019-05-01 DIAGNOSIS — I10 ESSENTIAL HYPERTENSION, BENIGN: ICD-10-CM

## 2019-05-01 DIAGNOSIS — M35.00 SJOGREN'S SYNDROME, WITH UNSPECIFIED ORGAN INVOLVEMENT (HCC): Primary | ICD-10-CM

## 2019-05-01 DIAGNOSIS — E78.00 HYPERCHOLESTEROLEMIA: ICD-10-CM

## 2019-05-01 DIAGNOSIS — F32.A ANXIETY AND DEPRESSION: Primary | ICD-10-CM

## 2019-05-01 DIAGNOSIS — F41.9 ANXIETY AND DEPRESSION: Primary | ICD-10-CM

## 2019-05-01 DIAGNOSIS — J41.0 SIMPLE CHRONIC BRONCHITIS (HCC): ICD-10-CM

## 2019-05-01 NOTE — PROGRESS NOTES
Pt in office to evaluated for assisted living. 1. Have you been to the ER, urgent care clinic since your last visit? Hospitalized since your last visit? No    2. Have you seen or consulted any other health care providers outside of the [address] [place] [address] since your last visit? Include any pap smears or colon screening.  No

## 2019-05-01 NOTE — PROGRESS NOTES
Ness Hoskins is a 80 y.o. female  presents for eval for adult home placement. No new complaints. Allergies   Allergen Reactions    Avapro [Irbesartan] Other (comments)     Hypotension and dizziness    Contrast Agent [Iodine] Other (comments)     Cant remember    Crestor [Rosuvastatin] Itching and Myalgia    Pcn [Penicillins] Rash     Other reaction(s): hives    Plaquenil [Hydroxychloroquine] Rash    Quinine Other (comments)     Other reaction(s): other/intolerance  \"ringing in my ears\"  Ringing in ear     Outpatient Medications Marked as Taking for the 5/1/19 encounter (Office Visit) with Jeannene Prader, MD   Medication Sig Dispense Refill    metoprolol tartrate (LOPRESSOR) 50 mg tablet take 1 tablet by mouth twice a day 180 Tab 0    potassium chloride (KLOR-CON) 10 mEq tablet Take 1 Tab by mouth daily. 30 Tab 2    raNITIdine (ZANTAC) 150 mg tablet take 1 tablet by mouth once daily 30 Tab 0    polyethylene glycol (MIRALAX) 17 gram packet Take 1 Packet by mouth daily. 100 Packet 3    COL-RITE 100 mg capsule take 1 capsule by mouth twice a day for 14 days  0    furosemide (LASIX) 40 mg tablet take 1 tablet by mouth once daily 30 Tab 5    pravastatin (PRAVACHOL) 10 mg tablet Take 1 Tab by mouth daily. 90 Tab 3    lidocaine (XYLOCAINE) 5 % ointment Apply  2-3 inches to affected area 3-4 times a day 120 g 0    acetaminophen (TYLENOL) 325 mg tablet Take 325 mg by mouth as needed for Pain.  aspirin delayed-release 81 mg tablet Take 81 mg by mouth daily.        Patient Active Problem List   Diagnosis Code    Lupus erythematosus L93.0    Sjogren's syndrome ([address] M35.00    Chronic airway obstruction ([address] J44.9    Hypertensive heart disease I11.9    Coronary atherosclerosis of native coronary artery I25.10    Lymphoma, non-Hodgkin's (HCC) C85.90    Hypercholesterolemia E78.00    Essential hypertension, benign I10    Left displaced femoral neck fracture (HCC) S72.002A    Diffuse large B-cell lymphoma of lymph nodes of multiple regions ([facility] C83.38    Pulmonary fibrosis (HCC) J84.10    Status post angioplasty with stent Z95.820    Mixed hyperlipidemia E78.2    Lupus (systemic lupus erythematosus) (HCC) M32.9    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    Non-rheumatic mitral regurgitation I34.0    S/P angioplasty with stent Z95.820    History of shingles Z86.19    Pancreatic cyst K86.2    Kidney stone on left side N20.0    Chronic idiopathic constipation K59.04    Gastroesophageal reflux disease without esophagitis K21.9     Past Medical History:   Diagnosis Date    CAD (coronary artery disease)     Chronic    Chronic airway obstruction, not elsewhere classified     Frequent cough, wheezing secondary to lupus and COPD    Closed left hip fracture (HCC)     Decubitus ulcer of sacral region, stage 3 [facility] 8/18/2016    Dyspnea on exertion     Echocardiogram 12/02/2010    EF 60-65%. Gr 1 DDfx. Mild LVH. Mild MR.    Essential hypertension, benign     GERD (gastroesophageal reflux disease)     Headache(784.0)     History of myocardial perfusion scan 09/14/2012    No evidence of ischemia or infarction. Very mild apical thinning. EF 77%. No WMA. TID 1.46, suggests 3-vessel CAD. Intermediate to high risk pharm stress test due to TID.  Hypercholesterolemia     Lower extremity venous duplex 05/08/2013    Left leg:  No venous thrombosis or venous insufficiency.  Lupus erythematosus 1992    Lymphoma, non-Hodgkin's ([facility] 5/2011    Low-grade being followed by Dr. Elisa Velasquez without therapy currently    Mitral valve prolapse     10/2017    Pneumothorax 1981    Blebs in right lung with recurrent Pneumothorax    S/P cardiac cath 09/24/2012    Hvy calcification of coronary arteries. LM minimal.  mLAD 35%. oD2 70% (unchanged). LCX mild. pRCA mild. Minimal ISR of prev stent. LVEDP 14-16. EF 65%. No WMA.   Likely a falsely abnormal stress test.    Short-term memory loss     From fall in     Sjogren's syndrome ([facility]     report of ROGELIO, SSA, RF positive    Traumatic subdural hematoma ([facility]     Head injury with subdural - s/p fall     Social History     Socioeconomic History    Marital status:      Spouse name: Not on file    Number of children: Not on file    Years of education: Not on file    Highest education level: Not on file   Tobacco Use    Smoking status: Former Smoker     Last attempt to quit: 1981     Years since quittin.3    Smokeless tobacco: Never Used   Substance and Sexual Activity    Alcohol use: No    Drug use: No    Sexual activity: Not Currently   Social History Narrative    ** Merged History Encounter **          Family History   Problem Relation Age of Onset    Cancer Father     Cancer Brother         Review of Systems   Constitutional: Negative for chills, fever, malaise/fatigue and weight loss. Eyes: Negative for blurred vision. Respiratory: Negative for shortness of breath and wheezing. Cardiovascular: Negative for chest pain. Gastrointestinal: Negative for nausea and vomiting. Musculoskeletal: Negative for myalgias. Skin: Negative for rash. Neurological: Negative for weakness. Psychiatric/Behavioral: Negative for depression, hallucinations, memory loss, substance abuse and suicidal ideas. The patient is not nervous/anxious and does not have insomnia. Vitals:    19 1751   BP: 142/68   Pulse: 68   Resp: 14   SpO2: 96%   Weight: 129 lb (58.5 kg)   Height: 5' 5\" (1.651 m)   PainSc:   0 - No pain       Physical Exam   Constitutional: She is oriented to person, place, and time and well-developed, well-nourished, and in no distress. Neck: Normal range of motion. Neck supple. No thyromegaly present. Cardiovascular: Normal rate, regular rhythm and normal heart sounds. Pulmonary/Chest: Effort normal and breath sounds normal.   Musculoskeletal: Normal range of motion.    Neurological: She is alert and oriented to person, place, and time. Skin: Skin is warm and dry. Psychiatric: Mood, memory, affect and judgment normal.   Nursing note and vitals reviewed. Assessment/Plan      ICD-10-CM ICD-9-CM    1. Sjogren's syndrome, with unspecified organ involvement ([facility] M35.00 710.2    2. Other local lupus erythematosus L93.2 695.4    3. Simple chronic bronchitis (HCC) J41.0 491.0    4. Hypertensive heart disease I11.9 402.10    5. Essential hypertension, benign I10 401.1    6. Hypercholesterolemia E78.00 272.0      All stable    I have discussed the diagnosis with the patient and the intended plan of care as seen in the above orders. The patient has received an after-visit summary and questions were answered concerning future plans. I have discussed medication, side effects, and warnings with the patient in detail. The patient verbalized understanding and is in agreement with the plan of care. The patient will contact the office with any additional concerns.     lab results and schedule of future lab studies reviewed with patient    Tai Batista MD

## 2019-05-01 NOTE — TELEPHONE ENCOUNTER
Pt's daughter states that dr Mary Grace Purcell was supposed to prescribe citalopram 20 mg for patient. I do not see where this was sent in. Please advise. Wants it to go to [facility].

## 2019-05-01 NOTE — PATIENT INSTRUCTIONS
Chronic Obstructive Pulmonary Disease (COPD): Care Instructions  Your Care Instructions    Chronic obstructive pulmonary disease (COPD) is a general term for a group of lung diseases, including emphysema and chronic bronchitis. People with COPD have decreased airflow in and out of the lungs, which makes it hard to breathe. The airways also can get clogged with thick mucus. Cigarette smoking is a major cause of COPD. Although there is no cure for COPD, you can slow its progress. Following your treatment plan and taking care of yourself can help you feel better and live longer. Follow-up care is a key part of your treatment and safety. Be sure to make and go to all appointments, and call your doctor if you are having problems. It's also a good idea to know your test results and keep a list of the medicines you take. How can you care for yourself at home?   Staying healthy    · Do not smoke. This is the most important step you can take to prevent more damage to your lungs. If you need help quitting, talk to your doctor about stop-smoking programs and medicines. These can increase your chances of quitting for good.     · Avoid colds and flu. Get a pneumococcal vaccine shot. If you have had one before, ask your doctor whether you need a second dose. Get the flu vaccine every fall. If you must be around people with colds or the flu, wash your hands often.     · Avoid secondhand smoke, air pollution, and high altitudes. Also avoid cold, dry air and hot, humid air. Stay at home with your windows closed when air pollution is bad.    Medicines and oxygen therapy    · Take your medicines exactly as prescribed. Call your doctor if you think you are having a problem with your medicine.     · You may be taking medicines such as:  ? Bronchodilators. These help open your airways and make breathing easier. Bronchodilators are either short-acting (work for 6 to 9 hours) or long-acting (work for 24 hours).  You inhale most bronchodilators, so they start to act quickly. Always carry your quick-relief inhaler with you in case you need it while you are away from home. ? Corticosteroids (prednisone, budesonide). These reduce airway inflammation. They come in pill or inhaled form. You must take these medicines every day for them to work well.     · A spacer may help you get more inhaled medicine to your lungs. Ask your doctor or pharmacist if a spacer is right for you. If it is, ask how to use it properly.     · Do not take any vitamins, over-the-counter medicine, or herbal products without talking to your doctor first.     · If your doctor prescribed antibiotics, take them as directed. Do not stop taking them just because you feel better. You need to take the full course of antibiotics.     · Oxygen therapy boosts the amount of oxygen in your blood and helps you breathe easier. Use the flow rate your doctor has recommended, and do not change it without talking to your doctor first.   Activity    · Get regular exercise. Walking is an easy way to get exercise. Start out slowly, and walk a little more each day.     · Pay attention to your breathing. You are exercising too hard if you cannot talk while you are exercising.     · Take short rest breaks when doing household chores and other activities.     · Learn breathing methods--such as breathing through pursed lips--to help you become less short of breath.     · If your doctor has not set you up with a pulmonary rehabilitation program, talk to him or her about whether rehab is right for you. Rehab includes exercise programs, education about your disease and how to manage it, help with diet and other changes, and emotional support. Diet    · Eat regular, healthy meals. Use bronchodilators about 1 hour before you eat to make it easier to eat. Eat several small meals instead of three large ones.  Drink beverages at the end of the meal. Avoid foods that are hard to chew.     · Eat foods that contain protein so that you do not lose muscle mass.     · Talk with your doctor if you gain too much weight or if you lose weight without trying.    Mental health    · Talk to your family, friends, or a therapist about your feelings. It is normal to feel frightened, angry, hopeless, helpless, and even guilty. Talking openly about bad feelings can help you cope. If these feelings last, talk to your doctor. When should you call for help? Call 911 anytime you think you may need emergency care. For example, call if:    · You have severe trouble breathing.    Call your doctor now or seek immediate medical care if:    · You have new or worse trouble breathing.     · You cough up blood.     · You have a fever.    Watch closely for changes in your health, and be sure to contact your doctor if:    · You cough more deeply or more often, especially if you notice more mucus or a change in the color of your mucus.     · You have new or worse swelling in your legs or belly.     · You are not getting better as expected. Where can you learn more? Go to http://melvina-bridger.info/. Ignacia Ayala in the search box to learn more about \"Chronic Obstructive Pulmonary Disease (COPD): Care Instructions. \"  Current as of: September 5, 2018  Content Version: 11.9  © 6212-6665 [employer]. Care instructions adapted under license by [employer] (which disclaims liability or warranty for this information). If you have questions about a medical condition or this instruction, always ask your healthcare professional. [place] [address] any warranty or liability for your use of this information.

## 2019-05-02 RX ORDER — CITALOPRAM 20 MG/1
20 TABLET, FILM COATED ORAL DAILY
Qty: 30 TAB | Refills: 2 | Status: SHIPPED | OUTPATIENT
Start: 2019-05-02 | End: 2019-05-22 | Stop reason: SDUPTHER

## 2019-05-20 RX ORDER — POTASSIUM CHLORIDE 750 MG/1
TABLET, EXTENDED RELEASE ORAL
Qty: 30 TAB | Refills: 2 | Status: SHIPPED | OUTPATIENT
Start: 2019-05-20 | End: 2020-02-25

## 2019-05-22 DIAGNOSIS — F32.A ANXIETY AND DEPRESSION: ICD-10-CM

## 2019-05-22 DIAGNOSIS — F41.9 ANXIETY AND DEPRESSION: ICD-10-CM

## 2019-05-22 RX ORDER — FLUTICASONE PROPIONATE 50 MCG
2 SPRAY, SUSPENSION (ML) NASAL
Qty: 1 BOTTLE | Refills: 11 | Status: SHIPPED | OUTPATIENT
Start: 2019-05-22 | End: 2019-09-16 | Stop reason: SDUPTHER

## 2019-05-22 RX ORDER — DEXTROMETHORPHAN POLISTIREX 30 MG/5ML
60 SUSPENSION ORAL 2 TIMES DAILY
Qty: 200 ML | Refills: 0 | Status: SHIPPED | OUTPATIENT
Start: 2019-05-22 | End: 2019-06-01

## 2019-05-22 RX ORDER — CITALOPRAM 20 MG/1
20 TABLET, FILM COATED ORAL DAILY
Qty: 30 TAB | Refills: 2 | Status: SHIPPED | OUTPATIENT
Start: 2019-05-22

## 2019-05-22 NOTE — TELEPHONE ENCOUNTER
Patient's daughter called the office stating patient will be moving into an assistant living facility on Friday and needs a prescription for her Clexexa. She is also asking for a prescription for Flonase as well as Dylsum for patient to use as needed. Medications have been pended please review and sign if appropriate.

## 2019-05-24 ENCOUNTER — TELEPHONE (OUTPATIENT)
Dept: FAMILY MEDICINE CLINIC | Age: 84
End: 2019-05-24

## 2019-05-24 NOTE — TELEPHONE ENCOUNTER
Kevin Geiger from [place] at [facility] called for clarification on patient's Tylenol 325 mg script. Frequency is needed. Called and spoke to dr Montserrat Burton. He ordered for patient to take Tylenol 325 mg 1 pill every 6 hours as needed for pain. Luci repeated back this order. She will send over order form for dr Montserrat Burton to sign when he comes into the office on Tuesday.

## 2019-05-29 ENCOUNTER — TELEPHONE (OUTPATIENT)
Dept: FAMILY MEDICINE CLINIC | Age: 84
End: 2019-05-29

## 2019-06-26 ENCOUNTER — OFFICE VISIT (OUTPATIENT)
Dept: FAMILY MEDICINE CLINIC | Age: 84
End: 2019-06-26

## 2019-06-26 VITALS
HEART RATE: 78 BPM | WEIGHT: 129 LBS | RESPIRATION RATE: 12 BRPM | SYSTOLIC BLOOD PRESSURE: 138 MMHG | DIASTOLIC BLOOD PRESSURE: 62 MMHG | HEIGHT: 65 IN | BODY MASS INDEX: 21.49 KG/M2 | TEMPERATURE: 97.9 F

## 2019-06-26 DIAGNOSIS — Z71.89 ACP (ADVANCE CARE PLANNING): ICD-10-CM

## 2019-06-26 DIAGNOSIS — Z00.00 MEDICARE ANNUAL WELLNESS VISIT, SUBSEQUENT: ICD-10-CM

## 2019-06-26 DIAGNOSIS — M53.3 COCCYX PAIN: Primary | ICD-10-CM

## 2019-06-26 NOTE — PROGRESS NOTES
Chief Complaint   Patient presents with    Annual Wellness Visit    Cough    Tailbone Pain     Pt in office for [address] Pt states that she keeps coughing. Sometimes when she sits down to eat she'll start coughing and feel like she's choking. Also c/o tailbone pain. .       1. Have you been to the ER, urgent care clinic since your last visit? Hospitalized since your last visit? No    2. Have you seen or consulted any other health care providers outside of the [address] [place] [address] since your last visit? Include any pap smears or colon screening. No    This is the Subsequent Medicare Annual Wellness Exam, performed 12 months or more after the Initial AWV or the last Subsequent AWV    I have reviewed the patient's medical history in detail and updated the computerized patient record. History     Past Medical History:   Diagnosis Date    CAD (coronary artery disease)     Chronic    Chronic airway obstruction, not elsewhere classified     Frequent cough, wheezing secondary to lupus and COPD    Closed left hip fracture (HCC)     Decubitus ulcer of sacral region, stage 3 (HCC) 8/18/2016    Dyspnea on exertion     Echocardiogram 12/02/2010    EF 60-65%. Gr 1 DDfx. Mild LVH. Mild MR.    Essential hypertension, benign     GERD (gastroesophageal reflux disease)     Headache(784.0)     History of myocardial perfusion scan 09/14/2012    No evidence of ischemia or infarction. Very mild apical thinning. EF 77%. No WMA. TID 1.46, suggests 3-vessel CAD. Intermediate to high risk pharm stress test due to TID.  Hypercholesterolemia     Lower extremity venous duplex 05/08/2013    Left leg:  No venous thrombosis or venous insufficiency.     Lupus erythematosus 1992    Lymphoma, non-Hodgkin's ([facility] [address] 5/2011    Low-grade being followed by Dr. Benjy Davis without therapy currently    Mitral valve prolapse     10/2017    Pneumothorax 1981    Blebs in right lung with recurrent Pneumothorax    S/P cardiac cath 09/24/2012    Hvy calcification of coronary arteries. LM minimal.  mLAD 35%. oD2 70% (unchanged). LCX mild. pRCA mild. Minimal ISR of prev stent. LVEDP 14-16. EF 65%. No WMA. Likely a falsely abnormal stress test.    Short-term memory loss     From fall in 2003    Sjogren's syndrome ([facility] [address] 1992    report of ROGELIO, SSA, RF positive    Traumatic subdural hematoma ([facility] [address] 2003    Head injury with subdural - s/p fall      Past Surgical History:   Procedure Laterality Date    CARDIAC SURG PROCEDURE UNLIST      cardiac stent    CHEST SURGERY PROCEDURE UNLISTED  1981    RUL removal    HX ADENOIDECTOMY  11/2013    eyelids lifted    HX CATARACT REMOVAL Bilateral     w/ lens implants    HX CORONARY STENT PLACEMENT  8/21/2007    HX GI  02/13/2018    endoscopy    HX HEART CATHETERIZATION  9/24/2012    HX HEART CATHETERIZATION  8/21/2007    Dr. Nilda Bravo at [facility] - stent placed    HX HEENT  2003    subdural hematoma removed s/p fall    HX HYSTERECTOMY  1974    HX LOBECTOMY Right     upper portion    HX ORTHOPAEDIC  07/2016    Patial left hip replacement    HX PNEUMONECTOMY      partial    NEUROLOGICAL PROCEDURE UNLISTED      subdural hematoma     Current Outpatient Medications   Medication Sig Dispense Refill    citalopram (CELEXA) 20 mg tablet Take 1 Tab by mouth daily. 30 Tab 2    fluticasone propionate (FLONASE) 50 mcg/actuation nasal spray 2 Sprays by Both Nostrils route daily as needed for Rhinitis. 1 Bottle 11    potassium chloride (KLOR-CON) 10 mEq tablet take 1 tablet by mouth once daily 30 Tab 2    metoprolol tartrate (LOPRESSOR) 50 mg tablet take 1 tablet by mouth twice a day 180 Tab 0    raNITIdine (ZANTAC) 150 mg tablet take 1 tablet by mouth once daily 30 Tab 0    polyethylene glycol (MIRALAX) 17 gram packet Take 1 Packet by mouth daily.  100 Packet 3    COL-RITE 100 mg capsule take 1 capsule by mouth twice a day for 14 days  0    furosemide (LASIX) 40 mg tablet take 1 tablet by mouth once daily 30 Tab 5    pravastatin (PRAVACHOL) 10 mg tablet Take 1 Tab by mouth daily. 90 Tab 3    lidocaine (XYLOCAINE) 5 % ointment Apply  2-3 inches to affected area 3-4 times a day 120 g 0    acetaminophen (TYLENOL) 325 mg tablet Take 325 mg by mouth as needed for Pain.  aspirin delayed-release 81 mg tablet Take 81 mg by mouth daily.        Allergies   Allergen Reactions    Avapro [Irbesartan] Other (comments)     Hypotension and dizziness    Contrast Agent [Iodine] Other (comments)     Cant remember    Crestor [Rosuvastatin] Itching and Myalgia    Pcn [Penicillins] Rash     Other reaction(s): hives    Plaquenil [Hydroxychloroquine] Rash    Quinine Other (comments)     Other reaction(s): other/intolerance  \"ringing in my ears\"  Ringing in ear     Family History   Problem Relation Age of Onset    Cancer Father     Cancer Brother      Social History     Tobacco Use    Smoking status: Former Smoker     Last attempt to quit: 1981     Years since quittin.5    Smokeless tobacco: Never Used   Substance Use Topics    Alcohol use: No     Patient Active Problem List   Diagnosis Code    Lupus erythematosus L93.0    Sjogren's syndrome ([facility] [address] M35.00    Chronic airway obstruction ([facility] [address] J44.9    Hypertensive heart disease I11.9    Coronary atherosclerosis of native coronary artery I25.10    Lymphoma, non-Hodgkin's (HCC) C85.90    Hypercholesterolemia E78.00    Essential hypertension, benign I10    Left displaced femoral neck fracture (HCC) S72.002A    Diffuse large B-cell lymphoma of lymph nodes of multiple regions (HCC) C83.38    Pulmonary fibrosis (HCC) J84.10    Status post angioplasty with stent Z95.820    Mixed hyperlipidemia E78.2    Lupus (systemic lupus erythematosus) (HCC) M32.9    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 Functional Ability and Level of Safety:   Hearing Loss  Hearing needs to evaluated     Activities of Daily Living  The home contains: no safety equipment. and Pt uses walker/cane. Pt also uses shower chair. Patient needs help with:  laundry, bathroom needs and walking    Fall Risk  Fall Risk Assessment, last 12 mths 3/13/2019   Able to walk? Yes   Fall in past 12 months? -   Fall with injury? -   Number of falls in past 12 months -   Fall Risk Score -       Abuse Screen  Patient is not abused    Cognitive Screening   Evaluation of Cognitive Function:  Has your family/caregiver stated any concerns about your memory: no  Normal    Patient Care Team   Patient Care Team:  Sophie Reyes MD as PCP - General (Family Practice)  Vivian Shea MD (Hematology and Oncology)  Jeremy Peralta DO (Rheumatology)  Izaiah Cole MD (Surgical Oncology)  Grey Martinez MD (Family Practice)  Yenny Whitten MD (Orthopedic Surgery)  Alanis Portillo MD (Cardiology)  Chani Guevara MD (General Surgery)    Assessment/Plan   Education and counseling provided:  Are appropriate based on today's review and evaluation    Diagnoses and all orders for this visit:    1.  Medicare annual wellness visit, subsequent        Health Maintenance Due   Topic Date Due    Shingrix Vaccine Age 49> (1 of 2) 05/08/1981    GLAUCOMA SCREENING Q2Y  05/08/1996    Bone Densitometry (Dexa) Screening  05/08/1996    Pneumococcal 65+ years (2 of 2 - PCV13) 05/01/2011    MEDICARE YEARLY EXAM  08/20/2018     Dmitriy STALEY Tone Cavazos MD

## 2019-06-26 NOTE — PATIENT INSTRUCTIONS
Medicare Wellness Visit, Female The best way to live healthy is to have a lifestyle where you eat a well-balanced diet, exercise regularly, limit alcohol use, and quit all forms of tobacco/nicotine, if applicable. Regular preventive services are another way to keep healthy. Preventive services (vaccines, screening tests, monitoring & exams) can help personalize your care plan, which helps you manage your own care. Screening tests can find health problems at the earliest stages, when they are easiest to treat. [facility] follows the current, evidence-based guidelines published by the [facility] ([facility]) when recommending preventive services for our patients. Because we follow these guidelines, sometimes recommendations change over time as research supports it. (For example, mammograms used to be recommended annually. Even though Medicare will still pay for an annual mammogram, the newer guidelines recommend a mammogram every two years for women of average risk.) Of course, you and your doctor may decide to screen more often for some diseases, based on your risk and your health status. Preventive services for you include: - Medicare offers their members a free annual wellness visit, which is time for you and your primary care provider to discuss and plan for your preventive service needs. Take advantage of this benefit every year! 
-All adults over the age of 72 should receive the recommended pneumonia vaccines. Current USPSTF guidelines recommend a series of two vaccines for the best pneumonia protection.  
-All adults should have a flu vaccine yearly and a tetanus vaccine every 10 years. All adults age 61 and older should receive a shingles vaccine once in their lifetime.   
-A bone mass density test is recommended when a woman turns 65 to screen for osteoporosis. This test is only recommended one time, as a screening. Some providers will use this same test as a disease monitoring tool if you already have osteoporosis. -All adults age 38-68 who are overweight should have a diabetes screening test once every three years.  
-Other screening tests and preventive services for persons with diabetes include: an eye exam to screen for diabetic retinopathy, a kidney function test, a foot exam, and stricter control over your cholesterol.  
-Cardiovascular screening for adults with routine risk involves an electrocardiogram (ECG) at intervals determined by your doctor.  
-Colorectal cancer screenings should be done for adults age 54-65 with no increased risk factors for colorectal cancer. There are a number of acceptable methods of screening for this type of cancer. Each test has its own benefits and drawbacks. Discuss with your doctor what is most appropriate for you during your annual wellness visit. The different tests include: colonoscopy (considered the best screening method), a fecal occult blood test, a fecal DNA test, and sigmoidoscopy. -Breast cancer screenings are recommended every other year for women of normal risk, age 54-69. 
-Cervical cancer screenings for women over age 72 are only recommended with certain risk factors.  
-All adults born between [facility] should be screened once for Hepatitis C. Here is a list of your current Health Maintenance items (your personalized list of preventive services) with a due date: 
Health Maintenance Due Topic Date Due  Shingles Vaccine (1 of 2) 05/08/1981  Glaucoma Screening   05/08/1996  Bone Mineral Density   05/08/1996  Pneumococcal Vaccine (2 of 2 - PCV13) 05/01/2011 [facility] Annual Well Visit  08/20/2018 Advance Directives: Care Instructions Your Care Instructions An advance directive is a legal way to state your wishes at the end of your life. It tells your family and your doctor what to do if you can no longer say what you want. There are two main types of advance directives. You can change them any time that your wishes change. · A living will tells your family and your doctor your wishes about life support and other treatment. · A durable power of  for health care lets you name a person to make treatment decisions for you when you can't speak for yourself. This person is called a health care agent. If you do not have an advance directive, decisions about your medical care may be made by a doctor or a  who doesn't know you. It may help to think of an advance directive as a gift to the people who care for you. If you have one, they won't have to make tough decisions by themselves. Follow-up care is a key part of your treatment and safety. Be sure to make and go to all appointments, and call your doctor if you are having problems. It's also a good idea to know your test results and keep a list of the medicines you take. How can you care for yourself at home? · Discuss your wishes with your loved ones and your doctor. This way, there are no surprises. · Many states have a unique form. Or you might use a universal form that has been approved by many states. This kind of form can sometimes be completed and stored online. Your electronic copy will then be available wherever you have a connection to the Internet. In most cases, doctors will respect your wishes even if you have a form from a different state. · You don't need a  to do an advance directive. But you may want to get legal advice. · Think about these questions when you prepare an advance directive: 
? Who do you want to make decisions about your medical care if you are not able to? Many people choose a family member or close friend. ? Do you know enough about life support methods that might be used? If not, talk to your doctor so you understand. ? What are you most afraid of that might happen?  You might be afraid of having pain, losing your independence, or being kept alive by machines. ? Where would you prefer to die? Choices include your home, a hospital, or a nursing home. ? Would you like to have information about hospice care to support you and your family? ? Do you want to donate organs when you die? ? Do you want certain Shinto practices performed before you die? If so, put your wishes in the advance directive. · Read your advance directive every year, and make changes as needed. When should you call for help? Be sure to contact your doctor if you have any questions. Where can you learn more? Go to http://melvina-bridger.info/. Enter R264 in the search box to learn more about \"Advance Directives: Care Instructions. \" Current as of: April 18, 2018 Content Version: 11.9 © 6237-8361 [employer]. Care instructions adapted under license by [employer] (which disclaims liability or warranty for this information). If you have questions about a medical condition or this instruction, always ask your healthcare professional. [address] any warranty or liability for your use of this information.

## 2019-06-26 NOTE — PROGRESS NOTES
Aliyah Coombs is a 80 y.o. female  presents for follow up. She does tailbone pain. No injury or trauma. She has no assoc weakness or numbness. Allergies   Allergen Reactions    Avapro [Irbesartan] Other (comments)     Hypotension and dizziness    Contrast Agent [Iodine] Other (comments)     Cant remember    Crestor [Rosuvastatin] Itching and Myalgia    Pcn [Penicillins] Rash     Other reaction(s): hives    Plaquenil [Hydroxychloroquine] Rash    Quinine Other (comments)     Other reaction(s): other/intolerance  \"ringing in my ears\"  Ringing in ear     Outpatient Medications Marked as Taking for the 6/26/19 encounter (Office Visit) with Randy Zavala MD   Medication Sig Dispense Refill    citalopram (CELEXA) 20 mg tablet Take 1 Tab by mouth daily. 30 Tab 2    fluticasone propionate (FLONASE) 50 mcg/actuation nasal spray 2 Sprays by Both Nostrils route daily as needed for Rhinitis. 1 Bottle 11    potassium chloride (KLOR-CON) 10 mEq tablet take 1 tablet by mouth once daily 30 Tab 2    metoprolol tartrate (LOPRESSOR) 50 mg tablet take 1 tablet by mouth twice a day 180 Tab 0    raNITIdine (ZANTAC) 150 mg tablet take 1 tablet by mouth once daily 30 Tab 0    polyethylene glycol (MIRALAX) 17 gram packet Take 1 Packet by mouth daily. 100 Packet 3    COL-RITE 100 mg capsule take 1 capsule by mouth twice a day for 14 days  0    furosemide (LASIX) 40 mg tablet take 1 tablet by mouth once daily 30 Tab 5    pravastatin (PRAVACHOL) 10 mg tablet Take 1 Tab by mouth daily. 90 Tab 3    lidocaine (XYLOCAINE) 5 % ointment Apply  2-3 inches to affected area 3-4 times a day 120 g 0    acetaminophen (TYLENOL) 325 mg tablet Take 325 mg by mouth as needed for Pain.        Patient Active Problem List   Diagnosis Code    Lupus erythematosus L93.0    Sjogren's syndrome ([address] M35.00    Chronic airway obstruction ([address] J44.9    Hypertensive heart disease I11.9    Coronary atherosclerosis of native coronary artery I25.10    Lymphoma, non-Hodgkin's (HCC) C85.90    Hypercholesterolemia E78.00    Essential hypertension, benign I10    Left displaced femoral neck fracture (HCC) S72.002A    Diffuse large B-cell lymphoma of lymph nodes of multiple regions ([facility]) C83.38    Pulmonary fibrosis (HCC) J84.10    Status post angioplasty with stent Z95.820    Mixed hyperlipidemia E78.2    Lupus (systemic lupus erythematosus) (HCC) M32.9    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    Non-rheumatic mitral regurgitation I34.0    S/P angioplasty with stent Z95.820    History of shingles Z86.19    Pancreatic cyst K86.2    Kidney stone on left side N20.0    Chronic idiopathic constipation K59.04    Gastroesophageal reflux disease without esophagitis K21.9     Past Medical History:   Diagnosis Date    CAD (coronary artery disease)     Chronic    Chronic airway obstruction, not elsewhere classified     Frequent cough, wheezing secondary to lupus and COPD    Closed left hip fracture (HCC)     Decubitus ulcer of sacral region, stage 3 ([address] 8/18/2016    Dyspnea on exertion     Echocardiogram 12/02/2010    EF 60-65%. Gr 1 DDfx. Mild LVH. Mild MR.    Essential hypertension, benign     GERD (gastroesophageal reflux disease)     Headache(784.0)     History of myocardial perfusion scan 09/14/2012    No evidence of ischemia or infarction. Very mild apical thinning. EF 77%. No WMA. TID 1.46, suggests 3-vessel CAD. Intermediate to high risk pharm stress test due to TID.  Hypercholesterolemia     Lower extremity venous duplex 05/08/2013    Left leg:  No venous thrombosis or venous insufficiency.     Lupus erythematosus 1992    Lymphoma, non-Hodgkin's ([facility] [address] 5/2011    Low-grade being followed by Dr. Yuridia Gibson without therapy currently    Mitral valve prolapse     10/2017    Pneumothorax 1981    Blebs in right lung with recurrent Pneumothorax    S/P cardiac cath 09/24/2012    Hvy calcification of coronary arteries. LM minimal.  mLAD 35%. oD2 70% (unchanged). LCX mild. pRCA mild. Minimal ISR of prev stent. LVEDP 14-16. EF 65%. No WMA. Likely a falsely abnormal stress test.    Short-term memory loss     From fall in     Sjogren's syndrome ([facility] [address]     report of ROGELIO, SSA, RF positive    Traumatic subdural hematoma ([facility] [address]     Head injury with subdural - s/p fall     Social History     Socioeconomic History    Marital status:      Spouse name: Not on file    Number of children: Not on file    Years of education: Not on file    Highest education level: Not on file   Tobacco Use    Smoking status: Former Smoker     Last attempt to quit: 1981     Years since quittin.5    Smokeless tobacco: Never Used   Substance and Sexual Activity    Alcohol use: No    Drug use: No    Sexual activity: Not Currently   Social History Narrative    ** Merged History Encounter **          Family History   Problem Relation Age of Onset    Cancer Father     Cancer Brother         Review of Systems   Constitutional: Negative for chills, fever, malaise/fatigue and weight loss. Eyes: Negative for blurred vision. Respiratory: Negative for shortness of breath and wheezing. Cardiovascular: Negative for chest pain. Gastrointestinal: Negative for nausea and vomiting. Musculoskeletal: Positive for joint pain. Negative for myalgias. Skin: Negative for rash. Neurological: Negative for weakness. Vitals:    19 1602   BP: 138/62   Pulse: 78   Resp: 12   Temp: 97.9 °F (36.6 °C)   Weight: 129 lb (58.5 kg)   Height: 5' 5\" (1.651 m)   PainSc:   5   PainLoc: Back       Physical Exam   Constitutional: She is oriented to person, place, and time and well-developed, well-nourished, and in no distress. Neck: Normal range of motion. Neck supple. No thyromegaly present. Cardiovascular: Normal rate, regular rhythm and normal heart sounds.    Pulmonary/Chest: Effort normal and breath sounds normal. Musculoskeletal: Normal range of motion. Neurological: She is alert and oriented to person, place, and time. Skin: Skin is warm and dry. Psychiatric: Mood, memory, affect and judgment normal.   Nursing note and vitals reviewed. Assessment/Plan      ICD-10-CM ICD-9-CM    1. Coccyx pain M53.3 724.79 Continue current meds. 2. Medicare annual wellness visit, subsequent Z00.00 V70.0 REFERRAL TO AUDIOLOGY   3. ACP (advance care planning) Z71.89 V65.49      I have discussed the diagnosis with the patient and the intended plan of care as seen in the above orders. The patient has received an after-visit summary and questions were answered concerning future plans. I have discussed medication, side effects, and warnings with the patient in detail. The patient verbalized understanding and is in agreement with the plan of care. The patient will contact the office with any additional concerns.     lab results and schedule of future lab studies reviewed with patient    Vivian Casarez MD

## 2019-06-26 NOTE — ACP (ADVANCE CARE PLANNING)
Advance Care Planning (ACP) Provider Conversation Snapshot    Date of ACP Conversation: 06/26/19  Persons included in Conversation:  patient  Length of ACP Conversation in minutes:  16 minutes    Authorized Decision Maker (if patient is incapable of making informed decisions):    This person is:   Healthcare Agent/Medical Power of  under Advance Directive            For Patients with Decision Making Capacity:   Values/Goals: Exploration of values, goals, and preferences if recovery is not expected, even with continued medical treatment in the event of:  Imminent death  Severe, permanent brain injury    Conversation Outcomes / Follow-Up Plan:   Recommended completion of Advance Directive form after review of ACP materials and conversation with prospective healthcare agent

## 2019-06-27 ENCOUNTER — TELEPHONE (OUTPATIENT)
Dept: FAMILY MEDICINE CLINIC | Age: 84
End: 2019-06-27

## 2019-06-27 RX ORDER — AZELASTINE HYDROCHLORIDE, FLUTICASONE PROPIONATE 137; 50 UG/1; UG/1
SPRAY, METERED NASAL
COMMUNITY
End: 2019-09-16 | Stop reason: ALTCHOICE

## 2019-06-27 NOTE — TELEPHONE ENCOUNTER
Patients daughter came in yesterday after patients appointment. She wanted Dr. Maribell Wilhelm to update Ms. Saeed's medication list.     The [facility] Doctor has added:   Anoro Ellipta 62.5 mcg/25mcg, Si Puff Daily  Dymista 137mcg/50mcg  Si Puff Twice Daily

## 2019-07-31 ENCOUNTER — TELEPHONE (OUTPATIENT)
Dept: FAMILY MEDICINE CLINIC | Age: 84
End: 2019-07-31

## 2019-08-01 ENCOUNTER — OFFICE VISIT (OUTPATIENT)
Dept: FAMILY MEDICINE CLINIC | Age: 84
End: 2019-08-01

## 2019-08-01 VITALS
HEIGHT: 65 IN | WEIGHT: 129.6 LBS | BODY MASS INDEX: 21.59 KG/M2 | HEART RATE: 68 BPM | SYSTOLIC BLOOD PRESSURE: 138 MMHG | DIASTOLIC BLOOD PRESSURE: 90 MMHG | OXYGEN SATURATION: 97 % | TEMPERATURE: 97.8 F | RESPIRATION RATE: 16 BRPM

## 2019-08-01 DIAGNOSIS — J40 BRONCHITIS: Primary | ICD-10-CM

## 2019-08-01 RX ORDER — DOXYCYCLINE 100 MG/1
100 CAPSULE ORAL 2 TIMES DAILY
Qty: 20 CAP | Refills: 0 | Status: SHIPPED | OUTPATIENT
Start: 2019-08-01 | End: 2019-08-11

## 2019-08-01 NOTE — PROGRESS NOTES
Teja Allen is a 80 y.o. female  presents for coughing for several days. She has assoc vomiting with cough. No fever or chills  She does not have any sputum production. Allergies   Allergen Reactions    Avapro [Irbesartan] Other (comments)     Hypotension and dizziness    Contrast Agent [Iodine] Other (comments)     Cant remember    Crestor [Rosuvastatin] Itching and Myalgia    Pcn [Penicillins] Rash     Other reaction(s): hives    Plaquenil [Hydroxychloroquine] Rash    Quinine Other (comments)     Other reaction(s): other/intolerance  \"ringing in my ears\"  Ringing in ear     Outpatient Medications Marked as Taking for the 8/1/19 encounter (Office Visit) with Ubaldo Bocanegra MD   Medication Sig Dispense Refill    citalopram (CELEXA) 20 mg tablet Take 1 Tab by mouth daily. 30 Tab 2    potassium chloride (KLOR-CON) 10 mEq tablet take 1 tablet by mouth once daily 30 Tab 2    metoprolol tartrate (LOPRESSOR) 50 mg tablet take 1 tablet by mouth twice a day 180 Tab 0    raNITIdine (ZANTAC) 150 mg tablet take 1 tablet by mouth once daily 30 Tab 0    polyethylene glycol (MIRALAX) 17 gram packet Take 1 Packet by mouth daily. 100 Packet 3    furosemide (LASIX) 40 mg tablet take 1 tablet by mouth once daily 30 Tab 5    pravastatin (PRAVACHOL) 10 mg tablet Take 1 Tab by mouth daily. 90 Tab 3    lidocaine (XYLOCAINE) 5 % ointment Apply  2-3 inches to affected area 3-4 times a day 120 g 0    acetaminophen (TYLENOL) 325 mg tablet Take 325 mg by mouth as needed for Pain.        Patient Active Problem List   Diagnosis Code    Lupus erythematosus L93.0    Sjogren's syndrome ([address] M35.00    Chronic airway obstruction ([address] J44.9    Hypertensive heart disease I11.9    Coronary atherosclerosis of native coronary artery I25.10    Lymphoma, non-Hodgkin's (HCC) C85.90    Hypercholesterolemia E78.00    Essential hypertension, benign I10    Left displaced femoral neck fracture (HCC) S72.002A    Diffuse large B-cell lymphoma of lymph nodes of multiple regions ([facility] C83.38    Pulmonary fibrosis (HCC) J84.10    Status post angioplasty with stent Z95.820    Mixed hyperlipidemia E78.2    Lupus (systemic lupus erythematosus) (HCC) M32.9    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    Non-rheumatic mitral regurgitation I34.0    S/P angioplasty with stent Z95.820    History of shingles Z86.19    Pancreatic cyst K86.2    Kidney stone on left side N20.0    Chronic idiopathic constipation K59.04    Gastroesophageal reflux disease without esophagitis K21.9     Past Medical History:   Diagnosis Date    CAD (coronary artery disease)     Chronic    Chronic airway obstruction, not elsewhere classified     Frequent cough, wheezing secondary to lupus and COPD    Closed left hip fracture (HCC)     Decubitus ulcer of sacral region, stage 3 (HCC) 8/18/2016    Dyspnea on exertion     Echocardiogram 12/02/2010    EF 60-65%. Gr 1 DDfx. Mild LVH. Mild MR.    Essential hypertension, benign     GERD (gastroesophageal reflux disease)     Headache(784.0)     History of myocardial perfusion scan 09/14/2012    No evidence of ischemia or infarction. Very mild apical thinning. EF 77%. No WMA. TID 1.46, suggests 3-vessel CAD. Intermediate to high risk pharm stress test due to TID.  Hypercholesterolemia     Lower extremity venous duplex 05/08/2013    Left leg:  No venous thrombosis or venous insufficiency.  Lupus erythematosus 1992    Lymphoma, non-Hodgkin's ([facility] 5/2011    Low-grade being followed by Dr. Margaret Beltrán without therapy currently    Mitral valve prolapse     10/2017    Pneumothorax 1981    Blebs in right lung with recurrent Pneumothorax    S/P cardiac cath 09/24/2012    Hvy calcification of coronary arteries. LM minimal.  mLAD 35%. oD2 70% (unchanged). LCX mild. pRCA mild. Minimal ISR of prev stent. LVEDP 14-16. EF 65%. No WMA.   Likely a falsely abnormal stress test.    Short-term memory loss     From fall in     Sjogren's syndrome ([facility]     report of ROGELIO, SSA, RF positive    Traumatic subdural hematoma ([facility]     Head injury with subdural - s/p fall     Social History     Socioeconomic History    Marital status:      Spouse name: Not on file    Number of children: Not on file    Years of education: Not on file    Highest education level: Not on file   Tobacco Use    Smoking status: Former Smoker     Last attempt to quit: 1981     Years since quittin.6    Smokeless tobacco: Never Used   Substance and Sexual Activity    Alcohol use: No    Drug use: No    Sexual activity: Not Currently   Social History Narrative    ** Merged History Encounter **          Family History   Problem Relation Age of Onset    Cancer Father     Cancer Brother         Review of Systems   Constitutional: Negative for chills, fever, malaise/fatigue and weight loss. Eyes: Negative for blurred vision. Respiratory: Positive for cough. Negative for shortness of breath and wheezing. Cardiovascular: Negative for chest pain. Gastrointestinal: Positive for nausea and vomiting. Musculoskeletal: Negative for myalgias. Skin: Negative for rash. Neurological: Negative for weakness. Vitals:    19 1523   BP: 138/90   Pulse: 68   Resp: 16   Temp: 97.8 °F (36.6 °C)   TempSrc: Oral   SpO2: 97%   Weight: 129 lb 9.6 oz (58.8 kg)   Height: 5' 5\" (1.651 m)   PainSc:   0 - No pain       Physical Exam    Assessment/Plan      ICD-10-CM ICD-9-CM    1. Bronchitis J40 490 doxycycline (VIBRAMYCIN) 100 mg capsule     I have discussed the diagnosis with the patient and the intended plan of care as seen in the above orders. The patient has received an after-visit summary and questions were answered concerning future plans. I have discussed medication, side effects, and warnings with the patient in detail. The patient verbalized understanding and is in agreement with the plan of care. The patient will contact the office with any additional concerns.     lab results and schedule of future lab studies reviewed with patient    Alex Jones MD

## 2019-08-01 NOTE — PROGRESS NOTES
Santosh Parr is a 80 y.o. female presents in office for    Chief Complaint   Patient presents with    Cough        Visit Vitals  /90 (BP 1 Location: Left arm, BP Patient Position: Sitting)   Pulse 68   Temp 97.8 °F (36.6 °C) (Oral)   Resp 16   Ht 5' 5\" (1.651 m)   Wt 129 lb 9.6 oz (58.8 kg)   SpO2 97%   BMI 21.57 kg/m²         Health Maintenance Due   Topic Date Due    Shingrix Vaccine Age 49> (1 of 2) 05/08/1981    GLAUCOMA SCREENING Q2Y  05/08/1996    Bone Densitometry (Dexa) Screening  05/08/1996    Influenza Age 9 to Adult  08/01/2019         1. Have you been to the ER, urgent care clinic since your last visit? Hospitalized since your last visit? No    2. Have you seen or consulted any other health care providers outside of the [address] [place] [address] since your last visit? Include any pap smears or colon screening. No     3 most recent PHQ Screens 8/1/2019   Little interest or pleasure in doing things Not at all   Feeling down, depressed, irritable, or hopeless Not at all   Total Score PHQ 2 0     Abuse Screening Questionnaire 8/1/2019   Do you ever feel afraid of your partner? N   Are you in a relationship with someone who physically or mentally threatens you? N   Is it safe for you to go home? Y     Fall Risk Assessment, last 12 mths 8/1/2019   Able to walk? Yes   Fall in past 12 months?  No   Fall with injury? -   Number of falls in past 12 months -   Fall Risk Score -     Learning Assessment 8/29/2017   PRIMARY LEARNER Patient   HIGHEST LEVEL OF EDUCATION - PRIMARY LEARNER  > 4 YEARS OF COLLEGE   BARRIERS PRIMARY LEARNER NONE   PRIMARY LANGUAGE ENGLISH   LEARNER PREFERENCE PRIMARY LISTENING   ANSWERED BY patient     -   RELATIONSHIP SELF

## 2019-08-01 NOTE — PATIENT INSTRUCTIONS
Bronchitis: Care Instructions Your Care Instructions Bronchitis is inflammation of the bronchial tubes, which carry air to the lungs. The tubes swell and produce mucus, or phlegm. The mucus and inflamed bronchial tubes make you cough. You may have trouble breathing. Most cases of bronchitis are caused by viruses like those that cause colds. Antibiotics usually do not help and they may be harmful. Bronchitis usually develops rapidly and lasts about 2 to 3 weeks in otherwise healthy people. Follow-up care is a key part of your treatment and safety. Be sure to make and go to all appointments, and call your doctor if you are having problems. It's also a good idea to know your test results and keep a list of the medicines you take. How can you care for yourself at home? · Take all medicines exactly as prescribed. Call your doctor if you think you are having a problem with your medicine. · Get some extra rest. 
· Take an over-the-counter pain medicine, such as acetaminophen (Tylenol), ibuprofen (Advil, Motrin), or naproxen (Aleve) to reduce fever and relieve body aches. Read and follow all instructions on the label. · Do not take two or more pain medicines at the same time unless the doctor told you to. Many pain medicines have acetaminophen, which is Tylenol. Too much acetaminophen (Tylenol) can be harmful. · Take an over-the-counter cough medicine that contains dextromethorphan to help quiet a dry, hacking cough so that you can sleep. Avoid cough medicines that have more than one active ingredient. Read and follow all instructions on the label. · Breathe moist air from a humidifier, hot shower, or sink filled with hot water. The heat and moisture will thin mucus so you can cough it out. · Do not smoke. Smoking can make bronchitis worse. If you need help quitting, talk to your doctor about stop-smoking programs and medicines. These can increase your chances of quitting for good. When should you call for help? Call 911 anytime you think you may need emergency care. For example, call if: 
  · You have severe trouble breathing.  
 Call your doctor now or seek immediate medical care if: 
  · You have new or worse trouble breathing.  
  · You cough up dark brown or bloody mucus (sputum).  
  · You have a new or higher fever.  
  · You have a new rash.  
 Watch closely for changes in your health, and be sure to contact your doctor if: 
  · You cough more deeply or more often, especially if you notice more mucus or a change in the color of your mucus.  
  · You are not getting better as expected. Where can you learn more? Go to http://melvina-bridger.info/. Enter H333 in the search box to learn more about \"Bronchitis: Care Instructions. \" Current as of: September 5, 2018 Content Version: 12.1 © 6949-1848 [employer]. Care instructions adapted under license by [employer] (which disclaims liability or warranty for this information). If you have questions about a medical condition or this instruction, always ask your healthcare professional. [address] any warranty or liability for your use of this information.

## 2019-08-02 NOTE — TELEPHONE ENCOUNTER
Patient was in the office yesterday was prescribed an antibiotic, doxycycline. A copy of the script was given to the patient to put with her files for [facility].

## 2019-08-28 ENCOUNTER — TELEPHONE (OUTPATIENT)
Dept: FAMILY MEDICINE CLINIC | Age: 84
End: 2019-08-28

## 2019-08-28 NOTE — TELEPHONE ENCOUNTER
Please call Amara Belcher at [facility], in ref to a referra they received for Ms Brooklynn Eng.  [phone]

## 2019-08-29 NOTE — TELEPHONE ENCOUNTER
Called [place] back was placed on hold held for approximatley 10 mins, tried to call the office back no answer telephone just rang will call back later.

## 2019-09-04 NOTE — TELEPHONE ENCOUNTER
[address] [facility] was told Yolanda Arias is not in that office today left a message letting her know I was returning her call about patient, left office number.

## 2019-09-16 DIAGNOSIS — J40 BRONCHITIS: Primary | ICD-10-CM

## 2019-09-16 RX ORDER — BENZONATATE 200 MG/1
200 CAPSULE ORAL
Qty: 24 CAP | Refills: 2 | Status: SHIPPED | OUTPATIENT
Start: 2019-09-16 | End: 2020-02-06 | Stop reason: SDUPTHER

## 2019-09-16 RX ORDER — FLUTICASONE PROPIONATE 50 MCG
2 SPRAY, SUSPENSION (ML) NASAL
Qty: 1 BOTTLE | Refills: 5 | Status: SHIPPED | OUTPATIENT
Start: 2019-09-16

## 2019-09-25 ENCOUNTER — OFFICE VISIT (OUTPATIENT)
Dept: CARDIOLOGY CLINIC | Age: 84
End: 2019-09-25

## 2019-09-25 VITALS
OXYGEN SATURATION: 97 % | BODY MASS INDEX: 20.99 KG/M2 | WEIGHT: 126 LBS | HEIGHT: 65 IN | HEART RATE: 71 BPM | SYSTOLIC BLOOD PRESSURE: 122 MMHG | DIASTOLIC BLOOD PRESSURE: 72 MMHG

## 2019-09-25 DIAGNOSIS — E78.2 MIXED HYPERLIPIDEMIA: ICD-10-CM

## 2019-09-25 DIAGNOSIS — R53.83 FATIGUE, UNSPECIFIED TYPE: ICD-10-CM

## 2019-09-25 DIAGNOSIS — E78.5 DYSLIPIDEMIA: ICD-10-CM

## 2019-09-25 DIAGNOSIS — I34.0 MITRAL VALVE INSUFFICIENCY, UNSPECIFIED ETIOLOGY: ICD-10-CM

## 2019-09-25 DIAGNOSIS — I25.10 ATHEROSCLEROSIS OF NATIVE CORONARY ARTERY OF NATIVE HEART WITHOUT ANGINA PECTORIS: Primary | ICD-10-CM

## 2019-09-25 DIAGNOSIS — R06.02 SOB (SHORTNESS OF BREATH): ICD-10-CM

## 2019-09-25 RX ORDER — MECLIZINE HYDROCHLORIDE 25 MG/1
25 TABLET ORAL
COMMUNITY
Start: 2019-09-02

## 2019-09-25 RX ORDER — CLINDAMYCIN HYDROCHLORIDE 300 MG/1
CAPSULE ORAL
Refills: 0 | COMMUNITY
Start: 2019-09-15

## 2019-09-25 NOTE — PROGRESS NOTES
HPI:  I saw Kalpana Sneed in my office today in cardiovascular evaluation regarding her chronic ischemic heart disease.  Ms. Mellisa Brewer is a pleasant 80 year old [place] American female with history of hypertension, hypercholesterolemia, erythrocytosis, Sjogren's syndrome, non Hodgkin's lymphoma, and coronary artery disease, status post stenting of a right coronary artery back in August of 2007. She has had a lot of problems with shortness of breath and fatigue over the years, which I have felt have been multifactorial. She has had two separate cardiac catheterizations since her angioplasty due to these symptoms, one in 2011 and another one in September, 2012, both of which demonstrated nonobstructive coronary artery disease without recurrent in stent restenosis.     She does have some chronic fibrotic rales in her right base and history of a right upper lobectomy for blebs back in 1990, so certainly part of her problem could be pulmonary and her O2 saturation on room air was 91 % when I saw her on December 1, 2017 but it is 95% today.      When I saw her in the office December 2015 she seemed to be having the same shortness of breath issues and some desaturation with ambulation that she had when I saw her again in the office in December 2017 suggesting that this was a pulmonary process.  However, in the past few years she has been living up in the [place] area and saw Dr. Ebonie Munguia for her increased shortness of breath and apparently was found to have a significant mitral regurgitation murmur which prompted an echocardiogram August 18, 2017 which revealed moderate left atrial enlargement and prolapse of the anterior cusp of the mitral valve with severe mitral regurgitation.  In reviewing her echo from back in December 2010 she had only mild mitral regurgitation at that time and in reviewing my note of December 15, 2015 she did not appear to have a significant heart murmur. Silvestre Yeager I saw her on December 1, 2017 she clearly appeared to have a significant mitral regurgitation murmur but she was having some dementia issues at that time and did not appear to be a candidate for any type of intervention, so I did not do any further cardiac workup at that time. She comes in today relates that she is doing reasonably well. She is currently at the [facility] and relates that she does have occasional palpitations and some leg swelling as well as some shortness of breath but these problems appear to be mild and stable. Encounter Diagnoses   Name Primary?  Atherosclerosis of native coronary artery of native heart without angina pectoris Yes    Fatigue, unspecified type     Mitral valve insufficiency, mild to moderate     SOB (shortness of breath)     Mixed hyperlipidemia     Dyslipidemia        Discussion: This lady appears to be doing reasonably well at this juncture. The auscultatory examination of her heart does not suggest that she is having significant mitral regurgitation and I do not think we need to repeat her echocardiogram at this time although I would like to get an echocardiogram in December which would be 18 months from her last echo to reassess her mitral regurgitation overall left ventricular systolic function. Certainly if she has any problems with increased dyspnea or palpitation issues in the meantime she should let us know and I would reevaluate her and probably do the echocardiogram more quickly. Her latest lipid profile that I have a copy of was done back in September 2018 but it showed total cholesterol of 163 with a HDL of 74 and LDL of 76 which I think is really fairly good control on only Pravachol 10 mg daily.     Her blood pressure is adequately controlled today and her EKG though abnormal appears to be stable and although it is different from her EKG of December 7, 2018 it is really quite similar to the one in June 4, 2018 so I do not feel that further work-up due to any EKG changes needed at this time and I will just see her again in several months.     PCP:   Maia Marrero MD      Past Medical History:   Diagnosis Date    CAD (coronary artery disease)     Chronic    Chronic airway obstruction, not elsewhere classified     Frequent cough, wheezing secondary to lupus and COPD    Closed left hip fracture ([facility]     Decubitus ulcer of sacral region, stage 3 ([facility] 8/18/2016    Dyspnea on exertion     Echocardiogram 12/02/2010    EF 60-65%. Gr 1 DDfx. Mild LVH. Mild MR.    Essential hypertension, benign     GERD (gastroesophageal reflux disease)     Headache(784.0)     History of myocardial perfusion scan 09/14/2012    No evidence of ischemia or infarction. Very mild apical thinning. EF 77%. No WMA. TID 1.46, suggests 3-vessel CAD. Intermediate to high risk pharm stress test due to TID.  Hypercholesterolemia     Lower extremity venous duplex 05/08/2013    Left leg:  No venous thrombosis or venous insufficiency.  Lupus erythematosus 1992    Lymphoma, non-Hodgkin's ([facility] 5/2011    Low-grade being followed by Dr. Megan Damon without therapy currently    Mitral valve prolapse     10/2017    Pneumothorax 1981    Blebs in right lung with recurrent Pneumothorax    S/P cardiac cath 09/24/2012    Hvy calcification of coronary arteries. LM minimal.  mLAD 35%. oD2 70% (unchanged). LCX mild. pRCA mild. Minimal ISR of prev stent. LVEDP 14-16. EF 65%. No WMA.   Likely a falsely abnormal stress test.    Short-term memory loss     From fall in 2003    Sjogren's syndrome ([facility] 1992    report of ROGELIO, SSA, RF positive    Traumatic subdural hematoma ([facility] 2003    Head injury with subdural - s/p fall         Past Surgical History:   Procedure Laterality Date    CARDIAC SURG PROCEDURE UNLIST      cardiac stent    CHEST SURGERY PROCEDURE UNLISTED  1981    RUL removal    HX ADENOIDECTOMY  11/2013    eyelids lifted    HX CATARACT REMOVAL Bilateral     w/ lens implants    HX CORONARY STENT PLACEMENT  8/21/2007    HX GI  02/13/2018    endoscopy    HX HEART CATHETERIZATION  9/24/2012    HX HEART CATHETERIZATION  8/21/2007    Dr. Sarabjit Guevara at [facility] - stent placed    HX HEENT  2003    subdural hematoma removed s/p fall    HX HYSTERECTOMY  1974    HX LOBECTOMY Right     upper portion    HX ORTHOPAEDIC  07/2016    Patial left hip replacement    HX PNEUMONECTOMY      partial    NEUROLOGICAL PROCEDURE UNLISTED      subdural hematoma       Current Outpatient Medications   Medication Sig    meclizine (ANTIVERT) 25 mg tablet Take 25 mg by mouth.  fluticasone propionate (FLONASE) 50 mcg/actuation nasal spray 2 Sprays by Both Nostrils route daily as needed for Rhinitis.  umeclidinium-vilanterol (ANORO ELLIPTA) 62.5-25 mcg/actuation inhaler Take 1 Puff by inhalation daily. 1 puff twice daily    citalopram (CELEXA) 20 mg tablet Take 1 Tab by mouth daily.  potassium chloride (KLOR-CON) 10 mEq tablet take 1 tablet by mouth once daily    metoprolol tartrate (LOPRESSOR) 50 mg tablet take 1 tablet by mouth twice a day    raNITIdine (ZANTAC) 150 mg tablet take 1 tablet by mouth once daily    polyethylene glycol (MIRALAX) 17 gram packet Take 1 Packet by mouth daily.  COL-RITE 100 mg capsule take 1 capsule by mouth twice a day for 14 days    furosemide (LASIX) 40 mg tablet take 1 tablet by mouth once daily    pravastatin (PRAVACHOL) 10 mg tablet Take 1 Tab by mouth daily.  lidocaine (XYLOCAINE) 5 % ointment Apply  2-3 inches to affected area 3-4 times a day    acetaminophen (TYLENOL) 325 mg tablet Take 325 mg by mouth as needed for Pain.  aspirin delayed-release 81 mg tablet Take 81 mg by mouth daily.  clindamycin (CLEOCIN) 300 mg capsule take 1 capsule by mouth three times a day     No current facility-administered medications for this visit.              Allergies   Allergen Reactions    Avapro [Irbesartan] Other (comments)     Hypotension and dizziness  Contrast Agent [Iodine] Other (comments)     Cant remember    Crestor [Rosuvastatin] Itching and Myalgia    Pcn [Penicillins] Rash     Other reaction(s): hives    Plaquenil [Hydroxychloroquine] Rash    Quinine Other (comments)     Other reaction(s): other/intolerance  \"ringing in my ears\"  Ringing in ear       Review of Systems:    Constitutional: Positive for weight loss. Respiratory: Positive for cough and shortness of breath. Negative for hemoptysis and wheezing. Cardiovascular: Positive for palpitations and leg swelling. Gastrointestinal: Positive for nausea. Negative for abdominal pain, blood in stool, constipation, diarrhea, heartburn, melena and vomiting. Musculoskeletal: Negative. Neurological: Negative. Physical Exam:    Visit Vitals  /72   Pulse 71   Ht 5' 5\" (1.651 m)   Wt 57.2 kg (126 lb)   SpO2 97%   BMI 20.97 kg/m²       The patient was a cooperative, alert, well-developed, well-nourished, 15-year-old, [place] American female, who is in no acute distress at the time of the examination. HEENT: Conjuctiva white, mucosa moist, no pallor or cyanosis. NECK: Supple without masses, tenderness or thyromegaly. There was no jugular venous distention. Carotid are full bilaterally without bruits. CHEST: Symmetrical with good excursion. LUNGS: Clear to auscultation in all fields. Few chronic rales in the right base  HEART: The apex is not displaced. There were no lifts, thrills or heaves. There is a normal S1 and S2. There is a grade 2/6 holosystolic murmur at the apex with radiation to the axilla without appreciable diastolic murmurs, rubs, clicks, or gallops auscultated. ABDOMEN: Soft without masses, tenderness or organomegaly. EXTREMITIES: Full peripheral pulses with trace to 1 +  peripheral edema of the left leg with minimal on the right. Review of Data: Please refer to past medical history for most recent cardiac testing.   Lab Results   Component Value Date/Time Cholesterol, total 163 09/07/2018 12:20 PM    HDL Cholesterol 74 (H) 09/07/2018 12:20 PM    LDL, calculated 76.4 09/07/2018 12:20 PM    Triglyceride 63 09/07/2018 12:20 PM    CHOL/HDL Ratio 2.2 09/07/2018 12:20 PM         Results for orders placed or performed in visit on 09/25/19   AMB POC EKG ROUTINE W/ 12 LEADS, INTER & REP     Status: None    Narrative    Normal sinus rhythm rate 71. Cannot exclude past inferior wall MI with possible pathologic Q in lead III and a minimal Q in aVF. Diffuse ST-T flattening without acute change. This EKG is similar to the EKG of December 7, 2018 except that the suggestion of interim inferior wall myocardial infarction is new         Rebeca Vasquez D.O., F.A.C.C. [facility]  [facility]  [address] [place] [address]. [address]    PLEASE NOTE:  This document has been produced using voice recognition software. Unrecognized errors in transcription may be present.

## 2019-10-09 ENCOUNTER — TELEPHONE (OUTPATIENT)
Dept: FAMILY MEDICINE CLINIC | Age: 84
End: 2019-10-09

## 2019-10-11 NOTE — TELEPHONE ENCOUNTER
Pito Grimes called the office again stating what she is getting faxed back on patient is not what she needs, she says she needs the acknowledgment of receipt from a fax that was sent over on 10/6/19 about patient refusing her medication, please review and advise.

## 2019-10-11 NOTE — TELEPHONE ENCOUNTER
Naomi from [employer] at [facility] called back today in ref to her call from 10/09/19. Please call her back at [phone].

## 2019-12-26 ENCOUNTER — TELEPHONE (OUTPATIENT)
Dept: FAMILY MEDICINE CLINIC | Age: 84
End: 2019-12-26

## 2019-12-26 DIAGNOSIS — I10 ESSENTIAL HYPERTENSION, BENIGN: Primary | ICD-10-CM

## 2019-12-26 NOTE — TELEPHONE ENCOUNTER
Patient's daughter Tyrese Jean-Baptiste called to let us know her mom was seen in urgent care, [facility] in [place] on 12/25 for what she though was a sinus infection but ended up also finding a UTI. They also found her to anemic and there was concern about Creatinine level which was 1.6, dropped to 1.4 today after they told her to push fluids. She is scheduled for 1/7/2020 since Dr. Carmen Thomas is out next week and patient can't come in tomorrow b/c she is still out of town until Sunday. The daughter states if Dr. Carmen Thomas would like her to have repeat labs prior to [facility] she can go have this done if he places an order. I told her if that is needed someone will call her to let her know, otherwise we will see her on 1/7/2020 and labs can also be drwan at [facility] if needed.

## 2020-01-07 ENCOUNTER — OFFICE VISIT (OUTPATIENT)
Dept: FAMILY MEDICINE CLINIC | Age: 85
End: 2020-01-07

## 2020-01-07 VITALS
BODY MASS INDEX: 20.99 KG/M2 | WEIGHT: 126 LBS | HEIGHT: 65 IN | SYSTOLIC BLOOD PRESSURE: 138 MMHG | RESPIRATION RATE: 10 BRPM | DIASTOLIC BLOOD PRESSURE: 64 MMHG

## 2020-01-07 DIAGNOSIS — E78.2 MIXED HYPERLIPIDEMIA: ICD-10-CM

## 2020-01-07 DIAGNOSIS — I11.9 BENIGN HYPERTENSIVE HEART DISEASE WITHOUT HEART FAILURE: ICD-10-CM

## 2020-01-07 DIAGNOSIS — I10 ESSENTIAL HYPERTENSION, BENIGN: ICD-10-CM

## 2020-01-07 DIAGNOSIS — K21.9 GASTROESOPHAGEAL REFLUX DISEASE WITHOUT ESOPHAGITIS: ICD-10-CM

## 2020-01-07 DIAGNOSIS — J43.8 OTHER EMPHYSEMA (HCC): ICD-10-CM

## 2020-01-07 DIAGNOSIS — K59.04 CHRONIC IDIOPATHIC CONSTIPATION: ICD-10-CM

## 2020-01-07 DIAGNOSIS — L93.2 OTHER LOCAL LUPUS ERYTHEMATOSUS: Primary | ICD-10-CM

## 2020-01-07 DIAGNOSIS — E78.00 HYPERCHOLESTEROLEMIA: ICD-10-CM

## 2020-01-07 NOTE — PATIENT INSTRUCTIONS
Learning About Saving Energy When You Have a Chronic Condition  Introduction    Everyday tasks can be tiring when you have COPD, heart failure, or another long-term (chronic) condition. You may feel at times that you've lost your ability to live your life. But learning to conserve, or save, your energy can help you be less tired. Conserving your energy means finding ways of doing daily activities with as little effort as possible. With some small changes in the way you do things, you can get your tasks done more easily. Some treatments are available that might help. Pulmonary rehabilitation can teach you ways to breathe easier. Cardiac rehabilitation can help make your heart stronger. You also may want to see an occupational or physical therapist. The therapist can give you more tips on building strength and moving with less effort. What can you do to conserve your energy? Planning  · Make a list of what you have to do every day. Group the tasks by location. · Do all the chores in one part of your house around the same time. · Go out for errands or do chores at the time of day when you have the most energy. · Plan rest periods into your day. Getting things done  · Sit down as often as you can when you get dressed, do chores, or cook. · Use a cart with wheels to roll items, such as laundry, from one room to another. · Push or slide boxes or other large items instead of lifting them. Reaching and bending  · Put things you use the most on shelves that are at the level of your waist or shoulder. · Use long-handled grabbers or other tools to reach items on a high shelf or to  things off the floor. Use long-handled dusters when you clean the house. · Use a raised toilet seat to avoid bending too far to sit or stand up. Eating  · Eat several small meals instead of three larger meals. · If you get too tired to eat much, try to choose healthy foods that have more calories.  Have a yogurt-and-fruit smoothie for breakfast. Put avocado on a sandwich. Or add cheese or peanut butter to snacks. · If you don't feel very hungry, try to eat first and drink water or other fluids later, after a meal. This can help keep you from losing weight. Sip small amounts of fluids if you need to drink while you eat. Having sex  · Choose the time of day when you have more energy. · A scvi-tx-ybyo position for sex can be less tiring. Sometimes you may want to focus more on caressing. Watch closely for changes in your health, and be sure to contact your doctor if you have any problems. Where can you learn more? Go to http://melvina-bridger.info/. Enter H190 in the search box to learn more about \"Learning About Saving Energy When You Have a Chronic Condition. \"  Current as of: June 9, 2019  Content Version: 12.2  © 5761-7145 [employer]. Care instructions adapted under license by [employer] (which disclaims liability or warranty for this information). If you have questions about a medical condition or this instruction, always ask your healthcare professional. [address] any warranty or liability for your use of this information.

## 2020-01-07 NOTE — PROGRESS NOTES
Ness Hoskins is a 80 y.o. female  presents for follow up of chronic illness and follow up from urgent care. No cough chest pains SOB new weakness or numbness. Allergies   Allergen Reactions    Avapro [Irbesartan] Other (comments)     Hypotension and dizziness    Contrast Agent [Iodine] Other (comments)     Cant remember    Crestor [Rosuvastatin] Itching and Myalgia    Pcn [Penicillins] Rash     Other reaction(s): hives    Plaquenil [Hydroxychloroquine] Rash    Quinine Other (comments)     Other reaction(s): other/intolerance  \"ringing in my ears\"  Ringing in ear       Patient Active Problem List   Diagnosis Code    Lupus erythematosus L93.0    Sjogren's syndrome ([facility]) M35.00    Chronic airway obstruction ([facility]) J44.9    Hypertensive heart disease I11.9    Coronary atherosclerosis of native coronary artery I25.10    Hypercholesterolemia E78.00    Essential hypertension, benign I10    Diffuse large B-cell lymphoma of lymph nodes of multiple regions ([facility]) C83.38    Pulmonary fibrosis ([facility]) J84.10    Status post angioplasty with stent Z95.820    Mixed hyperlipidemia E78.2    Lupus (systemic lupus erythematosus) ([facility]) M32.9    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    Non-rheumatic mitral regurgitation I34.0    S/P angioplasty with stent Z95.820    History of shingles Z86.19    Pancreatic cyst K86.2    Kidney stone on left side N20.0    Chronic idiopathic constipation K59.04    Gastroesophageal reflux disease without esophagitis K21.9     Past Medical History:   Diagnosis Date    CAD (coronary artery disease)     Chronic    Chronic airway obstruction, not elsewhere classified     Frequent cough, wheezing secondary to lupus and COPD    Closed left hip fracture ([facility])     Decubitus ulcer of sacral region, stage 3 ([facility]) 8/18/2016    Dyspnea on exertion     Echocardiogram 12/02/2010    EF 60-65%. Gr 1 DDfx. Mild LVH.   Mild MR.    Essential hypertension, benign     GERD (gastroesophageal reflux disease)     Headache(784.0)     History of myocardial perfusion scan 2012    No evidence of ischemia or infarction. Very mild apical thinning. EF 77%. No WMA. TID 1.46, suggests 3-vessel CAD. Intermediate to high risk pharm stress test due to TID.  Hypercholesterolemia     Lower extremity venous duplex 2013    Left leg:  No venous thrombosis or venous insufficiency.  Lupus erythematosus     Lymphoma, non-Hodgkin's ([facility] 2011    Low-grade being followed by Dr. Cedric Sesay without therapy currently    Mitral valve prolapse     10/2017    Pneumothorax 1981    Blebs in right lung with recurrent Pneumothorax    S/P cardiac cath 2012    Hvy calcification of coronary arteries. LM minimal.  mLAD 35%. oD2 70% (unchanged). LCX mild. pRCA mild. Minimal ISR of prev stent. LVEDP 14-16. EF 65%. No WMA. Likely a falsely abnormal stress test.    Short-term memory loss     From fall in     Sjogren's syndrome ([facility]     report of ROGELIO, SSA, RF positive    Traumatic subdural hematoma ([facility]     Head injury with subdural - s/p fall     Social History     Socioeconomic History    Marital status:      Spouse name: Not on file    Number of children: Not on file    Years of education: Not on file    Highest education level: Not on file   Tobacco Use    Smoking status: Former Smoker     Last attempt to quit: 1981     Years since quittin.0    Smokeless tobacco: Never Used   Substance and Sexual Activity    Alcohol use: No    Drug use: No    Sexual activity: Not Currently   Social History Narrative    ** Merged History Encounter **          Family History   Problem Relation Age of Onset    Cancer Father     Cancer Brother         Review of Systems   Constitutional: Negative for chills, fever, malaise/fatigue and weight loss. Eyes: Negative for blurred vision. Respiratory: Negative for shortness of breath and wheezing. Cardiovascular: Negative for chest pain. Gastrointestinal: Negative for nausea and vomiting. Musculoskeletal: Negative for myalgias. Skin: Negative for rash. Neurological: Negative for weakness. Psychiatric/Behavioral: Negative for depression, hallucinations, memory loss, substance abuse and suicidal ideas. The patient is not nervous/anxious and does not have insomnia. Vitals:    01/07/20 1404   BP: 138/64   Resp: 10   Weight: 126 lb (57.2 kg)   Height: 5' 5\" (1.651 m)   PainSc:   0 - No pain       Physical Exam  Vitals signs and nursing note reviewed. Neck:      Musculoskeletal: Normal range of motion and neck supple. Thyroid: No thyromegaly. Cardiovascular:      Rate and Rhythm: Normal rate and regular rhythm. Heart sounds: Normal heart sounds. Pulmonary:      Effort: Pulmonary effort is normal.      Breath sounds: Normal breath sounds. Musculoskeletal: Normal range of motion. Skin:     General: Skin is warm and dry. Neurological:      Mental Status: She is alert and oriented to person, place, and time. Psychiatric:         Mood and Affect: Mood normal.         Behavior: Behavior normal.         Thought Content: Thought content normal.         Judgment: Judgment normal.         Assessment/Plan      ICD-10-CM ICD-9-CM    1. Other local lupus erythematosus L93.2 695.4    2. Other emphysema ([facility] J43.8 492.8    3. Hypertensive heart disease I11.9 402.10    4. Hypercholesterolemia E78.00 272.0    5. Essential hypertension, benign I10 401.1 CBC WITH AUTOMATED DIFF      URINALYSIS W/ RFLX MICROSCOPIC   6. Mixed hyperlipidemia E78.2 272.2    7. Gastroesophageal reflux disease without esophagitis K21.9 530.81    8. Chronic idiopathic constipation K59.04 564.00      All stable  Will repeat metabolic panel      I have discussed the diagnosis with the patient and the intended plan of care as seen in the above orders.  The patient has received an after-visit summary and questions were answered concerning future plans. I have discussed medication, side effects, and warnings with the patient in detail. The patient verbalized understanding and is in agreement with the plan of care. The patient will contact the office with any additional concerns.       lab results and schedule of future lab studies reviewed with patient    Natasha Wallace MD

## 2020-01-07 NOTE — PROGRESS NOTES
Pt was seen at Urgent Care for anemia and elevated creatinine levels. Will need form completed for nursing facility and order sheet filled out. 1. Have you been to the ER, urgent care clinic since your last visit? Hospitalized since your last visit? No    2. Have you seen or consulted any other health care providers outside of the [address] [place] [address] since your last visit? Include any pap smears or colon screening.  No

## 2020-01-27 ENCOUNTER — TELEPHONE (OUTPATIENT)
Dept: FAMILY MEDICINE CLINIC | Age: 85
End: 2020-01-27

## 2020-01-27 NOTE — TELEPHONE ENCOUNTER
[facility] faxed over a Refill Auth request for Benzonatate 200 Mg capsule 30 capsule. Please review and advise, order is in Dr. Ontiveros Branch box. If you have any questions please call [phone] or fax to [phone] Note Order is coming from a  Out of State Long term care Facility.

## 2020-02-03 ENCOUNTER — PATIENT OUTREACH (OUTPATIENT)
Dept: CASE MANAGEMENT | Age: 85
End: 2020-02-03

## 2020-02-03 NOTE — Clinical Note
Spoke with daughter. She would like to follow up with Dr. Shaylee Man for pt's c/o CP in the hospital. She mentioned patient's BP was also elevated in hospital. Declined PCP appt at this time.

## 2020-02-03 NOTE — PROGRESS NOTES
Hospital Discharge Follow-Up      Date/Time:  2/3/2020 4:23 PM    Patient was admitted to [facility] on 20 and discharged on 20 for abdominal pain. The physician discharge summary was available at the time of outreach. Patient was contacted within 1 business days of discharge. Top Challenges reviewed with the provider   Advance Care Planning:   Does patient have an Advance Directive:  AMD reviewed and current; DDNR invalid and new document needs to be completed; daughter has been made aware        Was this a readmission? no   Patient stated reason for the readmission: N/A    Care Transition Nurse (CTN) contacted the family by telephone to perform post hospital discharge assessment. Spoke with Malcolm Garcia, daughter (listed on PHI). Verified name and  with family as identifiers. Provided introduction to self, and explanation of the CTN role. Family received hospital discharge instructions. Patient resides an [facility]. Family given an opportunity to ask questions and does not have any further questions or concerns at this time. The family agrees to contact the PCP office for questions related to their healthcare. CTN provided contact information for future reference. Patients top risk factors for readmission:  None at this time    Home Health orders at discharge: PT, OT, [address]: [facility]  Date of initial visit: TBD    Durable Medical Equipment ordered at discharge: none  [address]: None   Durable Medical Equipment received: None    Medication(s):   New Medications at Discharge: Nephro caps  Changed Medications at Discharge: None  Discontinued Medications at Discharge: None    Contacted [facility] ([facility] known [facility]) to reconcile medications. Was informed the charge nurse was not available. Writer attempt to contact at a later time. Current Outpatient Medications   Medication Sig    meclizine (ANTIVERT) 25 mg tablet Take 25 mg by mouth.  clindamycin (CLEOCIN) 300 mg capsule take 1 capsule by mouth three times a day    fluticasone propionate (FLONASE) 50 mcg/actuation nasal spray 2 Sprays by Both Nostrils route daily as needed for Rhinitis.  umeclidinium-vilanterol (ANORO ELLIPTA) 62.5-25 mcg/actuation inhaler Take 1 Puff by inhalation daily. 1 puff twice daily    citalopram (CELEXA) 20 mg tablet Take 1 Tab by mouth daily.  potassium chloride (KLOR-CON) 10 mEq tablet take 1 tablet by mouth once daily    metoprolol tartrate (LOPRESSOR) 50 mg tablet take 1 tablet by mouth twice a day    raNITIdine (ZANTAC) 150 mg tablet take 1 tablet by mouth once daily    polyethylene glycol (MIRALAX) 17 gram packet Take 1 Packet by mouth daily.  COL-RITE 100 mg capsule take 1 capsule by mouth twice a day for 14 days    furosemide (LASIX) 40 mg tablet take 1 tablet by mouth once daily    pravastatin (PRAVACHOL) 10 mg tablet Take 1 Tab by mouth daily.  lidocaine (XYLOCAINE) 5 % ointment Apply  2-3 inches to affected area 3-4 times a day    acetaminophen (TYLENOL) 325 mg tablet Take 325 mg by mouth as needed for Pain.  aspirin delayed-release 81 mg tablet Take 81 mg by mouth daily. No current facility-administered medications for this visit. There are no discontinued medications. [facility] follow up appointment(s):   Future Appointments   Date Time Provider [facility]   3/30/2020  1:00 PM Lianna Suarez DO [address] [place] [address]   7/8/2020  4:15 PM MD Yassine Norman      Non-[facility] follow up appointment(s): None    Goals Addressed                 This Visit's Progress     COMPLETED: Coordinate Pain Management Plan for Patient. 9/28/17- Patient has Lidocaine patches at home and is going to try one of these to her painful area , she will just have to position it so she is able to use the restroom without removing it. She is also using Tylenol PRN.  LN       COMPLETED: Establish PCP relationships and regularly scheduled appointments. 9/28/17- Appt scheduled with Dr Huang Brower for Monday 10/2/17. LN       Prevent complications post hospitalization. Ensure provider appt is scheduled within 7 days post-discharge; 2/3: Daughter voiced pt just seen.  Prefers to follow up with cardiology regarding c/o CP in hospital and BP   Confirm patient attended post-discharge provider appt   Complete post-visit call to confirm attendance and update care needs

## 2020-02-06 DIAGNOSIS — J40 BRONCHITIS: ICD-10-CM

## 2020-02-06 RX ORDER — BENZONATATE 200 MG/1
200 CAPSULE ORAL
Qty: 24 CAP | Refills: 2 | Status: SHIPPED | OUTPATIENT
Start: 2020-02-06 | End: 2020-02-13

## 2020-02-06 NOTE — TELEPHONE ENCOUNTER
This pharmacy faxed over request for the following prescriptions to be filled:    Medication requested :   Requested Prescriptions     Pending Prescriptions Disp Refills    benzonatate (TESSALON) 200 mg capsule 24 Cap 2     Sig: Take 1 Cap by mouth three (3) times daily as needed for Cough for up to 7 days.         PCP: DR. Molina Alejandre. 192 or Print: PHARMACY  Mail order or Local pharmacy: [facility]   PHONE [phone]   MOR [phone]    Scheduled appointment if not seen by current providers in office: LAST APPT 1-7-2020

## 2020-02-18 ENCOUNTER — PATIENT OUTREACH (OUTPATIENT)
Dept: CASE MANAGEMENT | Age: 85
End: 2020-02-18

## 2020-02-18 NOTE — PROGRESS NOTES
Received voicemail message from [facility]. Robert Ventura voiced she was informed they could not accept referral. Robert Ventura voiced it was her understanding referral would be accepted as it is within 30 days of face to face. Writer contacted mgr Ninoska/educator for [employer]. Writer left voicemail including name, role, 2 patient identifiers and reason for call. Requested return call. Contact info provided.

## 2020-02-18 NOTE — PROGRESS NOTES
Transitions of Care Coordination  Follow-Up    Date/Time:  2/18/2020 2:09 PM     CTN (Care Transitions Nurse) contacted Annabel at [facility] for Transitions of Care Coordination  follow up. Spoke to SALOME Sr (director of nursing). Introduced self/role and reason for call. Caregiver reported:   Doing well overall  Pt had been refusing Ensure; received order today from Dr. Alex Alcantar to discontinue     Specialist appointments since last outreach? no  If so, specialist and date: [address]: [address] verbalized she does not see any notes regarding home health. Barriers to care? None at this time    Writer contacted [facility] to follow up on home health order. Spoke with Harper Felix. Provided introduction and 2 patient identifiers. Harper Felix voiced no order was received. Per Kunal Tejeda, home health order is active and face to face was completed by discharging physician Dr. Alfonzo Benito. Spoke with Chandra Millan , manager of [facility]. Gabriel Claude voiced [facility] (inpatient ) did not send/place referral for home health. Per Gabriel Claude, home health face to face should be good for 30 days. Contacted [place] [facility]) [place], [place]. Provided introduction, 2 patient identifiers and reason for call. Andrew Braun voiced she will follow up and return call. Andrew Braun  requested writer's contact info. Direct contact info provided. Contacted Dunia King, daughter for follow up. Daughter verbalized they are are planning to transition patient to the  [facility] side; closer to her. Daughter reported she is eating more. Informed daughter patient refused Ensure. Denied stomach or chest pain. Daughter also voiced patient is to follow up with the pulmonology. Also informed daughter, Kary Brunner has followed up on home health and awaiting a return call. Daughter agrees patient may need some therapy but is unsure if patient will comply. Daughter denied any concerns or needs at this time. Family reminded that there are physicians on call 24 hours a day / 7 days a week (M-F 5pm to 8am and from Friday 5pm until Monday 8a for the weekend) should the patient have questions or concerns. Received return call from Amelia Tsai, [address] [facility]. Amelia Tsai voiced they have a [facility] liaison in the hospital and will take care of home health referral today. Future Appointments   Date Time Provider Alfonso Mcdaniel   3/30/2020  1:00 PM Yousuf Knox [address]   7/8/2020  4:15 PM MD Yassine Benitez      Other upcoming appointments:  None    Goals      Prevent complications post hospitalization. Ensure provider appt is scheduled within 7 days post-discharge; 2/3: Daughter voiced pt just seen.  Prefers to follow up with cardiology regarding c/o CP in hospital and BP   Confirm patient attended post-discharge provider appt   Complete post-visit call to confirm attendance and update care needs

## 2020-02-21 ENCOUNTER — TELEPHONE (OUTPATIENT)
Dept: FAMILY MEDICINE CLINIC | Age: 85
End: 2020-02-21

## 2020-02-21 NOTE — TELEPHONE ENCOUNTER
Pt's daughter, Ms. Garcia (on HIPAA) called wanting to know if patient was to be taking potassium? She states she noticed potassium had fallen off her mom's med list at her living facility. I told pt that I would send dr Guillermina Saxena a message for him to review on Monday. If she has not heard from me after lunchtime she can give me a call. She expressed clear understanding and had no further questions.

## 2020-02-25 RX ORDER — POTASSIUM CHLORIDE 750 MG/1
TABLET, EXTENDED RELEASE ORAL
Qty: 90 TAB | Refills: 2 | Status: SHIPPED | OUTPATIENT
Start: 2020-02-25

## 2020-02-26 NOTE — TELEPHONE ENCOUNTER
Called pt's daughter yesterday to let her know about medication. No answer. I also called [place] at [facility] to let the nurses there know that potassium was added back on. I told them [facility] but if it needs to go to a different pharmacy to let us know. She expressed understanding. I faxed over potassium script. Fax confirmation received.

## 2020-03-06 ENCOUNTER — PATIENT OUTREACH (OUTPATIENT)
Dept: CASE MANAGEMENT | Age: 85
End: 2020-03-06

## 2020-03-06 NOTE — PROGRESS NOTES
Patient has graduated from the Transitions of Care Coordination  program on 3/6/20. Contacted patient for Transitions of Care Coordination  follow up. No answer. Left message introducing self, role and reason for call. Requested return call. Contact information provided. Patient's symptoms are stable at this time. Patient/family has the ability to self-manage. Care management goals have been completed at this time. No further care transitions nurse follow up scheduled. Goals Addressed                 This Visit's Progress     COMPLETED: Prevent complications post hospitalization. Ensure provider appt is scheduled within 7 days post-discharge; 2/3: Daughter voiced pt just seen. Prefers to follow up with cardiology regarding c/o CP in hospital and BP   Complete post-visit call to confirm attendance and update care needs; 2/18: Done; following up on home health                  Patient has care transitions nurse contact information for any further questions, concerns, or needs.   Patient's upcoming visits:    Future Appointments   Date Time Provider Alfonso Mcdaniel   3/9/2020  2:30 PM MD Yassine Steel   3/30/2020  1:00 PM Niels Young DO [address] [place] [address]   7/8/2020  4:15 PM MD Yassine Steel

## 2020-05-22 ENCOUNTER — TELEPHONE (OUTPATIENT)
Dept: FAMILY MEDICINE CLINIC | Age: 85
End: 2020-05-22

## 2020-05-22 NOTE — TELEPHONE ENCOUNTER
Tez Sales with [facility] called and states Ms. Yadi Wagner just moved there yesterday. The pharmacy they use is not wanting to send them her Pravastatin b/c she is allergic to Crestor. She is requesting a note on letter head that states she has documented allergy to Crestor but not all statins. Fax # is [phone]. Juanita Montero states she is putting this med on hold until she can get documentation. Juanita Montero said her cell# [phone] is easiest way to reach her if there are any questions.

## 2020-05-26 NOTE — TELEPHONE ENCOUNTER
Ziggy Abernathy MD  You 26 minutes ago (8:32 AM)     She is allergic to crestor and she has not been on Prevastatin since 2018 she does not need to start it now.  So no prevastatin    Called Ms. Alix Garcia back no answer unable to leave message voicemail is full.

## 2020-05-26 NOTE — TELEPHONE ENCOUNTER
Called Pippa Cee back told patient is not to be taking the Pravastatin due to allergy, she is asking if an order to d/c can be faxed. She has been made aware Dr. Bob Gtz out of the office until tomorrow morning, order has been placed in his folder to be signed.

## 2020-09-03 ENCOUNTER — TELEPHONE (OUTPATIENT)
Dept: FAMILY MEDICINE CLINIC | Age: 85
End: 2020-09-03

## 2020-09-03 NOTE — TELEPHONE ENCOUNTER
Patients daughter called, Agapito Santacruz. Ms. Abdirahman Jarvis is currently in [place], hospitalized at [facility] with the shingles & kidney function issues due to a reaction from the medication, Valtrex. She was diagnosed with shingles at [facility] in [place] who prescribed Valtrex. She was experiencing hallucinations after taking Valtrex. According to Ms. Coolia Gage, the hospital wants to know why the pravastatin was discontinued back on May 27th according to signed order from Dr. Rocio Hart. After reviewing the chart, patient has allergy to crestor noted in 2018. Pravastatin was discontinued after patient moved to [facility] in [facility]. The provider there was considered pcp and took over care. Please call Ms. Garcia back at [phone] to discuss why pravastatin was discontinued. She was in [place] visiting to see new baby.

## 2020-09-04 NOTE — TELEPHONE ENCOUNTER
Jacqueline Alejandre MD  You 12 minutes ago (4:37 PM)      It was stopped because she needed updated labs lipids and CMP to continue them. Called Ms. Arnold Quinter back she has been made aware why patient was taken off of her pravastatin and did not have any further questions or concerns.

## 2021-06-21 ENCOUNTER — APPOINTMENT (OUTPATIENT)
Dept: GENERAL RADIOLOGY | Age: 86
End: 2021-06-21
Attending: EMERGENCY MEDICINE
Payer: MEDICARE

## 2021-06-21 ENCOUNTER — HOSPITAL ENCOUNTER (EMERGENCY)
Age: 86
Discharge: HOME | End: 2021-06-21
Attending: EMERGENCY MEDICINE
Payer: MEDICARE

## 2021-06-21 VITALS
BODY MASS INDEX: 20.92 KG/M2 | RESPIRATION RATE: 18 BRPM | WEIGHT: 125.7 LBS | SYSTOLIC BLOOD PRESSURE: 160 MMHG | TEMPERATURE: 97.9 F | DIASTOLIC BLOOD PRESSURE: 103 MMHG | HEART RATE: 86 BPM | OXYGEN SATURATION: 97 %

## 2021-06-21 DIAGNOSIS — Z51.5 END OF LIFE CARE: Primary | ICD-10-CM

## 2021-06-21 LAB
ALBUMIN SERPL-MCNC: 3.8 G/DL (ref 3.4–5)
ALBUMIN/GLOB SERPL: 1.1 {RATIO} (ref 0.8–1.7)
ALP SERPL-CCNC: 67 U/L (ref 45–117)
ALT SERPL-CCNC: 16 U/L (ref 13–56)
ANION GAP SERPL CALC-SCNC: 4 MMOL/L (ref 3–18)
AST SERPL-CCNC: 32 U/L (ref 10–38)
ATRIAL RATE: 81 BPM
BASOPHILS # BLD: 0 K/UL (ref 0–0.1)
BASOPHILS NFR BLD: 0 % (ref 0–2)
BILIRUB SERPL-MCNC: 0.4 MG/DL (ref 0.2–1)
BUN SERPL-MCNC: 16 MG/DL (ref 7–18)
BUN/CREAT SERPL: 10 (ref 12–20)
CALCIUM SERPL-MCNC: 10.7 MG/DL (ref 8.5–10.1)
CALCULATED P AXIS, ECG09: 87 DEGREES
CALCULATED R AXIS, ECG10: -2 DEGREES
CALCULATED T AXIS, ECG11: 75 DEGREES
CHLORIDE SERPL-SCNC: 106 MMOL/L (ref 100–111)
CO2 SERPL-SCNC: 30 MMOL/L (ref 21–32)
CREAT SERPL-MCNC: 1.65 MG/DL (ref 0.6–1.3)
DIAGNOSIS, 93000: NORMAL
DIFFERENTIAL METHOD BLD: ABNORMAL
EOSINOPHIL # BLD: 0.2 K/UL (ref 0–0.4)
EOSINOPHIL NFR BLD: 4 % (ref 0–5)
ERYTHROCYTE [DISTWIDTH] IN BLOOD BY AUTOMATED COUNT: 13.6 % (ref 11.6–14.5)
GLOBULIN SER CALC-MCNC: 3.6 G/DL (ref 2–4)
GLUCOSE SERPL-MCNC: 98 MG/DL (ref 74–99)
HCT VFR BLD AUTO: 35.7 % (ref 35–45)
HGB BLD-MCNC: 11.6 G/DL (ref 12–16)
LYMPHOCYTES # BLD: 1.4 K/UL (ref 0.9–3.6)
LYMPHOCYTES NFR BLD: 29 % (ref 21–52)
MCH RBC QN AUTO: 29.7 PG (ref 24–34)
MCHC RBC AUTO-ENTMCNC: 32.5 G/DL (ref 31–37)
MCV RBC AUTO: 91.3 FL (ref 74–97)
MONOCYTES # BLD: 0.2 K/UL (ref 0.05–1.2)
MONOCYTES NFR BLD: 5 % (ref 3–10)
NEUTS SEG # BLD: 2.8 K/UL (ref 1.8–8)
NEUTS SEG NFR BLD: 61 % (ref 40–73)
P-R INTERVAL, ECG05: 194 MS
PLATELET # BLD AUTO: 280 K/UL (ref 135–420)
PMV BLD AUTO: 8.8 FL (ref 9.2–11.8)
POTASSIUM SERPL-SCNC: 4.7 MMOL/L (ref 3.5–5.5)
PROT SERPL-MCNC: 7.4 G/DL (ref 6.4–8.2)
Q-T INTERVAL, ECG07: 398 MS
QRS DURATION, ECG06: 66 MS
QTC CALCULATION (BEZET), ECG08: 462 MS
RBC # BLD AUTO: 3.91 M/UL (ref 4.2–5.3)
SODIUM SERPL-SCNC: 140 MMOL/L (ref 136–145)
TROPONIN I SERPL-MCNC: <0.02 NG/ML (ref 0–0.04)
VENTRICULAR RATE, ECG03: 81 BPM
WBC # BLD AUTO: 4.6 K/UL (ref 4.6–13.2)

## 2021-06-21 PROCEDURE — 80053 COMPREHEN METABOLIC PANEL: CPT

## 2021-06-21 PROCEDURE — 93005 ELECTROCARDIOGRAM TRACING: CPT

## 2021-06-21 PROCEDURE — 84484 ASSAY OF TROPONIN QUANT: CPT

## 2021-06-21 PROCEDURE — 99284 EMERGENCY DEPT VISIT MOD MDM: CPT

## 2021-06-21 PROCEDURE — 71045 X-RAY EXAM CHEST 1 VIEW: CPT

## 2021-06-21 PROCEDURE — 85025 COMPLETE CBC W/AUTO DIFF WBC: CPT

## 2021-06-21 NOTE — DISCHARGE INSTRUCTIONS
These follow-up with your doctor or clinic in 12 to 24 hours. Please talk to your doctor about further testing. Should you not want any hospitalizations, surgeries, cardiac catheterizations, please talk to your doctor about comfort care and hospice evaluation.

## 2021-06-21 NOTE — ED PROVIDER NOTES
Patient states she does not know why the medics were called. Said she has been in her normal state of health. She was washing dishes and had to lean over the sink, but this is not unusual for her. She denies chest pain. States she often gets a little winded with activity and thought nothing of it. Patient has no symptoms at this time. She states even if she was having a heart attack she would not want surgery or cardiac catheterization. States she is 80years old, she does not want to be hospitalized    The history is provided by the patient. Chest Pain (Angina)   The pain is associated with exertion. The patient is experiencing no pain. Associated symptoms include orthopnea and palpitations. Pertinent negatives include no abdominal pain, no back pain, no diaphoresis, no dizziness, no exertional chest pressure, no hemoptysis, no irregular heartbeat, no malaise/fatigue, no nausea, no near-syncope, no PND, no shortness of breath, no vomiting and no weakness. She has tried nothing for the symptoms. Risk factors include postmenopause. Past Medical History:   Diagnosis Date    CAD (coronary artery disease)     Chronic    Chronic airway obstruction, not elsewhere classified     Frequent cough, wheezing secondary to lupus and COPD    Closed left hip fracture (HCC)     Decubitus ulcer of sacral region, stage 3 ([facility]) 8/18/2016    Dyspnea on exertion     Echocardiogram 12/02/2010    EF 60-65%. Gr 1 DDfx. Mild LVH. Mild MR.    Essential hypertension, benign     GERD (gastroesophageal reflux disease)     Headache(784.0)     History of myocardial perfusion scan 09/14/2012    No evidence of ischemia or infarction. Very mild apical thinning. EF 77%. No WMA. TID 1.46, suggests 3-vessel CAD. Intermediate to high risk pharm stress test due to TID.  Hypercholesterolemia     Lower extremity venous duplex 05/08/2013    Left leg:  No venous thrombosis or venous insufficiency.     Lupus erythematosus     Lymphoma, non-Hodgkin's ([facility] [address] 2011    Low-grade being followed by Dr. Pacheco Kinsey without therapy currently    Mitral valve prolapse     10/2017    Pneumothorax 1981    Blebs in right lung with recurrent Pneumothorax    S/P cardiac cath 2012    Hvy calcification of coronary arteries. LM minimal.  mLAD 35%. oD2 70% (unchanged). LCX mild. pRCA mild. Minimal ISR of prev stent. LVEDP 14-16. EF 65%. No WMA.   Likely a falsely abnormal stress test.    Short-term memory loss     From fall in     Sjogren's syndrome ([facility] [place] [address]     report of ROGELIO, SSA, RF positive    Traumatic subdural hematoma ([facility] [place] [address]     Head injury with subdural - s/p fall       Past Surgical History:   Procedure Laterality Date    HX ADENOIDECTOMY  2013    eyelids lifted    HX CATARACT REMOVAL Bilateral     w/ lens implants    HX CORONARY STENT PLACEMENT  2007    HX GI  2018    endoscopy    HX HEART CATHETERIZATION  2012    HX HEART CATHETERIZATION  2007    Dr. Tamera A[facility] - stent placed    HX HEENT      subdural hematoma removed s/p fall    HX HYSTERECTOMY  1974    HX LOBECTOMY Right     upper portion    HX ORTHOPAEDIC  2016    Patial left hip replacement    HX PNEUMONECTOMY      partial    NEUROLOGICAL PROCEDURE UNLISTED      subdural hematoma    [place] CARDIAC SURG PROCEDURE UNLIST      cardiac stent    [place] CHEST SURGERY PROCEDURE UNLISTED  1981    RUL removal         Family History:   Problem Relation Age of Onset    Cancer Father     Cancer Brother        Social History     Socioeconomic History    Marital status:      Spouse name: Not on file    Number of children: Not on file    Years of education: Not on file    Highest education level: Not on file   Occupational History    Not on file   Tobacco Use    Smoking status: Former Smoker     Quit date: 1981     Years since quittin.4    Smokeless tobacco: Never Used   Substance and Sexual Activity    Alcohol use: No    Drug use: No    Sexual activity: Not Currently   Other Topics Concern    Not on file   Social History Narrative    ** Merged History Encounter **          Social Determinants of Health     Financial Resource Strain:     Difficulty of Paying Living Expenses:    Food Insecurity:     Worried About Running Out of Food in the Last Year:     Ran Out of Food in the Last Year:    Transportation Needs:     Lack of Transportation (Medical):  Lack of Transportation (Non-Medical):    Physical Activity:     Days of Exercise per Week:     Minutes of Exercise per Session:    Stress:     Feeling of Stress :    Social Connections:     Frequency of Communication with Friends and Family:     Frequency of Social Gatherings with Friends and Family:     Attends [employer] Services:     Active Member of Clubs or Organizations:     Attends Club or Organization Meetings:     Marital Status:    Intimate Partner Violence:     Fear of Current or Ex-Partner:     Emotionally Abused:     Physically Abused:     Sexually Abused: ALLERGIES: Avapro [irbesartan], Contrast agent [iodine], Crestor [rosuvastatin], Pcn [penicillins], Plaquenil [hydroxychloroquine], Quinine, and Sulfa (sulfonamide antibiotics)    Review of Systems   Constitutional: Negative for diaphoresis and malaise/fatigue. Respiratory: Negative for hemoptysis and shortness of breath. Cardiovascular: Positive for chest pain, palpitations and orthopnea. Negative for PND and near-syncope. Gastrointestinal: Negative for abdominal pain, nausea and vomiting. Musculoskeletal: Negative for back pain. Neurological: Negative for dizziness and weakness.        Vitals:    06/21/21 1458 06/21/21 1510   BP: (!) 160/103    Pulse:  86   Resp:  18   Temp:  97.9 °F (36.6 °C)   SpO2: 97% 97%   Weight:  57 kg (125 lb 11.2 oz)            Physical Exam     [facility]  ED Course as of Jun 21 1749   Mon Jun 21, 2021   1517 EKG normal sinus rhythm, poor baseline, PVC no prior for comparison at this time    [BT]   1537 Patient's family is spoken to the nursing staff, they state that the patient did not have chest pain, they are concerned the patient was evaluated in the ED    [BT]   1611 Patient has no symptoms, states she is at her baseline and even if she did have symptoms would not want aggressive or invasive care, will hold on further testing.   Discharge back to [facility]    [BT]      ED Course User Index  [BT] Lulu Bustamante MD       Procedures

## 2024-03-01 NOTE — TELEPHONE ENCOUNTER
Received a fax from Heather at [facility] requesting new orders for Zofran & Prilosec. Ms. Ajay Guzman has a hx of acid refflux. 7/30/19 she vomited a medium amount emesis. She coughs then vomits. No nausea or warning reported of emesis. Zantac has been ordered before, but asking for prilosec instead. [facility] #: [phone]  Alternate  #: [phone]  Fax Number #: [phone]    Requests stapled together and placed in Dr. Zo Beatty for review. Resume activity as tolerated

## (undated) DEVICE — FLUFF AND POLYMER UNDERPAD,EXTRA HEAVY: Brand: WINGS

## (undated) DEVICE — AIRLIFE™ NASAL OXYGEN CANNULA CURVED, FLARED TIP WITH 14 FOOT (4.3 M) CRUSH-RESISTANT TUBING, OVER-THE-EAR STYLE: Brand: AIRLIFE™

## (undated) DEVICE — STERILE POLYISOPRENE POWDER-FREE SURGICAL GLOVES: Brand: PROTEXIS

## (undated) DEVICE — GAUZE SPONGES,16 PLY: Brand: CURITY

## (undated) DEVICE — SOLUTION IRRIG 1000ML H2O STRL BLT

## (undated) DEVICE — MEDI-VAC SUCTION HIGH CAPACITY: Brand: CARDINAL HEALTH

## (undated) DEVICE — ENDOSCOPY PUMP TUBING/ CAP SET: Brand: ERBE

## (undated) DEVICE — (D)GLOVE EXAM LG NITRL NS -- DISC BY MFR NO SUB

## (undated) DEVICE — SYR 50ML SLIP TIP NSAF LF STRL --

## (undated) DEVICE — BASIN EMESIS 500CC ROSE 250/CS 60/PLT: Brand: MEDEGEN MEDICAL PRODUCTS, LLC

## (undated) DEVICE — MEDI-VAC NON-CONDUCTIVE SUCTION TUBING: Brand: CARDINAL HEALTH

## (undated) DEVICE — SYRINGE MED 25GA 3ML L5/8IN SUBQ PLAS W/ DETACH NDL SFTY

## (undated) DEVICE — FLEX ADVANTAGE 3000CC: Brand: FLEX ADVANTAGE

## (undated) DEVICE — FCPS RAD JAW 4LC 240CM W/NDL -- BX/20 RADIAL JAW 4

## (undated) DEVICE — CATHETER SUCT TR FL TIP 14FR W/ O CTRL

## (undated) DEVICE — BITE BLOCK ENDOSCP UNIV AD 6 TO 9.4 MM